# Patient Record
Sex: MALE | Race: WHITE | NOT HISPANIC OR LATINO | Employment: FULL TIME | URBAN - METROPOLITAN AREA
[De-identification: names, ages, dates, MRNs, and addresses within clinical notes are randomized per-mention and may not be internally consistent; named-entity substitution may affect disease eponyms.]

---

## 2017-04-03 ENCOUNTER — OFFICE VISIT (OUTPATIENT)
Dept: CARDIOLOGY | Facility: CLINIC | Age: 67
End: 2017-04-03

## 2017-04-03 VITALS
HEIGHT: 72 IN | BODY MASS INDEX: 29.8 KG/M2 | HEART RATE: 91 BPM | WEIGHT: 220 LBS | DIASTOLIC BLOOD PRESSURE: 60 MMHG | SYSTOLIC BLOOD PRESSURE: 118 MMHG

## 2017-04-03 DIAGNOSIS — R55 SYNCOPE, UNSPECIFIED SYNCOPE TYPE: Primary | ICD-10-CM

## 2017-04-03 DIAGNOSIS — E78.5 HYPERLIPIDEMIA, UNSPECIFIED HYPERLIPIDEMIA TYPE: ICD-10-CM

## 2017-04-03 DIAGNOSIS — I10 ESSENTIAL HYPERTENSION: ICD-10-CM

## 2017-04-03 PROCEDURE — 99214 OFFICE O/P EST MOD 30 MIN: CPT | Performed by: INTERNAL MEDICINE

## 2017-04-03 PROCEDURE — 93000 ELECTROCARDIOGRAM COMPLETE: CPT | Performed by: INTERNAL MEDICINE

## 2017-04-03 RX ORDER — TAMSULOSIN HYDROCHLORIDE 0.4 MG/1
0.4 CAPSULE ORAL DAILY
COMMUNITY

## 2017-04-03 RX ORDER — BENAZEPRIL HYDROCHLORIDE 40 MG/1
1 TABLET, FILM COATED ORAL DAILY
COMMUNITY
Start: 2017-02-15 | End: 2017-06-08 | Stop reason: SDUPTHER

## 2017-04-03 RX ORDER — PANTOPRAZOLE SODIUM 40 MG/1
40 TABLET, DELAYED RELEASE ORAL DAILY
COMMUNITY
Start: 2016-10-21 | End: 2017-10-21

## 2017-04-03 RX ORDER — AMLODIPINE BESYLATE 10 MG/1
1 TABLET ORAL DAILY
COMMUNITY
Start: 2016-07-21 | End: 2017-05-15

## 2017-04-03 RX ORDER — ALLOPURINOL 100 MG/1
1 TABLET ORAL DAILY
COMMUNITY
Start: 2016-10-23

## 2017-04-03 RX ORDER — PRAVASTATIN SODIUM 40 MG
1 TABLET ORAL DAILY
COMMUNITY
Start: 2016-07-21 | End: 2021-09-22

## 2017-04-03 RX ORDER — GABAPENTIN 300 MG/1
300 CAPSULE ORAL 4 TIMES DAILY
COMMUNITY
Start: 2016-12-21 | End: 2019-09-09 | Stop reason: ALTCHOICE

## 2017-04-03 RX ORDER — ASPIRIN 81 MG/1
81 TABLET ORAL DAILY
COMMUNITY

## 2017-04-03 RX ORDER — BENAZEPRIL HYDROCHLORIDE 40 MG/1
1 TABLET, FILM COATED ORAL DAILY
COMMUNITY
Start: 2017-03-20

## 2017-04-03 NOTE — PROGRESS NOTES
Date of Office Visit: 2017  Encounter Provider: Ailza Saleh MD  Place of Service: [facility] CARDIOLOGY  Patient Name: Dustin Wilkerson  :1950      Patient ID:  Dustin Wilkerson is a 66 y.o. male is here for syncope          History of Present Illness     Laboratory values from 12/15/2016, show normal BMP except for a glucose, which was elevated at 135, normal CBC, and normal liver panel.  I have an office visit to Dr. Mahoney from 2017.  At that visit, he was seen for three unexplained falls where he will suddenly fall without warning.  He has had an injury associated with this and was given a referral to cardiology and neurology.  He has a history of essential hypertension, gout, and hyperlipidemia.  I do not see any other medical records at this point.       We did check orthostatic blood pressures in the office today.  Lying his blood pressure was 130/76 with heart rate of 97, sitting his blood pressure was 120/70 with a heart rate of 90 and standing his blood pressure was 118/60 with a heart rate of 91.       In talking with him today, he gives a history that, in 2016, he was walking out in his yard, went over to turn the hose off, and fell.  He found himself on the ground.  He is not sure if he passed out and he had a right arm injury, which was moderately severe.  He was able to continue.  He said he did not feel his heart racing or skipping before this.  He had no dizziness.  He had no chest pain or pressure and no difficulty breathing.  He just did not really know what happened, and he has been a lifelong exerciser and has never had any symptoms with exercise.  On 2016, he had taken his medications.  It was late in the evening (11:00 at night), and he went onto his front porch and, the next thing he knew, he found himself on the ground.  He is not sure that he had any syncope.  In fact, he thinks he did not; however, he sustained a severe  right leg injury and had to have multiple surgeries to repair all of his quadriceps tendons on his right leg.  He does not know why this happened.  Again, before this, he had no chest pain or difficulty breathing.  In talking with him, he is on numerous medications and he takes all of them at night, and he had taken all of them right before this second incident in 12/2016.  Because his injury was so severe, he has not walked since 12/02/2016.  With his surgeries, he was in a wheelchair for a while and then he was on a walker and now he is with a cane and has a big brace on his leg.      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  This was in 08/2015.  He has seen Dr. Sunny Mcelroy from ENT about his hearing loss.  He also has an appointment with Dr. Dimas Richmond from neurology this summer.      He is treated for hypertension and hyperlipidemia but he has never had any kidney problems, no asthma or emphysema, no seizures, stroke, or aneurysms, no sleep apnea, no blood clots or cancer, no history of myocardial infarction or rheumatic fever, no history of heart rhythm disturbances, and no heart failure.      He has one caffeinated beverage a day and one 16-ounce bottle of water a day.  He has two children.  He is  and is the president of [employer].  In his family, there is no premature cardiovascular disease.  His mother had strokes.  He does not use any alcohol and he does not smoke.      He has been very healthy up to this point.  He says his blood pressure at baseline normally runs about 135/75 mmHg, so the fact that his blood pressure drops down to 118/60 mmHg is low for him.      He denies any symptoms of exertional chest tightness or pressure and no orthopnea or paroxysmal nocturnal dyspnea.          Past Medical History:   Diagnosis Date   • Gout          Past Surgical History:   Procedure Laterality Date   • KNEE  SURGERY         No current outpatient prescriptions on file prior to visit.     No current facility-administered medications on file prior to visit.        Social History     Social History   • Marital status: Unknown     Spouse name: N/A   • Number of children: N/A   • Years of education: N/A     Occupational History   • Not on file.     Social History Main Topics   • Smoking status: Never Smoker   • Smokeless tobacco: Not on file      Comment: caffeine use   • Alcohol use No   • Drug use: No   • Sexual activity: Not on file     Other Topics Concern   • Not on file     Social History Narrative   • No narrative on file           Review of Systems   Constitution: Negative.   HENT: Positive for hearing loss. Negative for congestion and headaches.    Eyes: Negative for vision loss in left eye and vision loss in right eye.   Respiratory: Positive for snoring. Negative for cough, hemoptysis, shortness of breath, sleep disturbances due to breathing, sputum production and wheezing.    Endocrine: Negative.    Hematologic/Lymphatic: Negative.    Skin: Negative for poor wound healing and rash.   Musculoskeletal: Negative for falls, gout, muscle cramps and myalgias.   Gastrointestinal: Negative for abdominal pain, diarrhea, dysphagia, hematemesis, melena, nausea and vomiting.   Neurological: Negative for excessive daytime sleepiness, dizziness, light-headedness, loss of balance, seizures and vertigo.   Psychiatric/Behavioral: Negative for depression and substance abuse. The patient is not nervous/anxious.        Procedures    ECG 12 Lead  Date/Time: 4/3/2017 1:10 PM  Performed by: BRIANNA FORBES  Authorized by: BRIANNA FORBES   Comparison: compared with previous ECG   Similar to previous ECG  Rhythm: sinus rhythm  Conduction: incomplete RBBB  QRS axis: left  Clinical impression: non-specific ECG               Objective:      Vitals:    04/03/17 1258 04/03/17 1302   BP: 130/70 140/76   BP Location: Right arm Left  "arm   Patient Position: Sitting Sitting   Pulse: 76    Weight: 220 lb (99.8 kg)    Height: 72\" (182.9 cm)      Body mass index is 29.84 kg/(m^2).    Physical Exam   Constitutional: He is oriented to person, place, and time. He appears well-developed and well-nourished. No distress.   HENT:   Head: Normocephalic and atraumatic.   Eyes: Conjunctivae are normal. No scleral icterus.   Neck: Neck supple. No JVD present. Carotid bruit is not present. No thyromegaly present.   Cardiovascular: Normal rate, regular rhythm, S1 normal, S2 normal, normal heart sounds and intact distal pulses.   No extrasystoles are present. PMI is not displaced.  Exam reveals no gallop.    No murmur heard.  Pulses:       Carotid pulses are 2+ on the right side, and 2+ on the left side.       Radial pulses are 2+ on the right side, and 2+ on the left side.        Dorsalis pedis pulses are 2+ on the right side, and 2+ on the left side.        Posterior tibial pulses are 2+ on the right side, and 2+ on the left side.   Pulmonary/Chest: Effort normal and breath sounds normal. No respiratory distress. He has no wheezes. He has no rhonchi. He has no rales. He exhibits no tenderness.   Abdominal: Soft. Bowel sounds are normal. He exhibits no distension, no abdominal bruit and no mass. There is no tenderness.   Musculoskeletal: He exhibits no edema or deformity.   Lymphadenopathy:     He has no cervical adenopathy.   Neurological: He is alert and oriented to person, place, and time. No cranial nerve deficit.   Skin: Skin is warm and dry. No rash noted. He is not diaphoretic. No cyanosis. No pallor. Nails show no clubbing.   Psychiatric: He has a normal mood and affect. Judgment normal.   Vitals reviewed.      Lab Review:       Assessment:      Diagnosis Plan   1. Syncope, unspecified syncope type     2. Essential hypertension     3. Hyperlipidemia, unspecified hyperlipidemia type        1. Two significant falls resulting in injury, both of them " right-sided (right arm injury and severe right leg injury).  He does not believe that he passed out with either of these and feels like he remembers most of the incident but cannot determine why he fell on either incident.  He has never had any palpitations and no dizziness, no chest pain or pressure, and no difficulty breathing, but I am concerned that maybe he has had some sort of rhythm disturbance or even is a little more orthostatic than what we have been able to appreciate.  I have asked him to take his Lotensin in the morning and I have ordered a Zio Patch and an echocardiogram.  He is also going to follow up with neurology (Dr. Dimas Richmond).    2. Hypertension, which has been under good control.    3. Hyperlipidemia for which he takes pravastatin.    4. Gout.      I am not sure why he takes Revatio and if that was at all involved in either of these incidences.           Plan:       F/u in 6 months.  No med changes.

## 2017-04-19 ENCOUNTER — TELEPHONE (OUTPATIENT)
Dept: CARDIOLOGY | Facility: CLINIC | Age: 67
End: 2017-04-19

## 2017-04-19 ENCOUNTER — HOSPITAL ENCOUNTER (OUTPATIENT)
Dept: CARDIOLOGY | Facility: HOSPITAL | Age: 67
Discharge: HOME | End: 2017-04-19
Attending: INTERNAL MEDICINE | Admitting: INTERNAL MEDICINE

## 2017-04-19 ENCOUNTER — HOSPITAL ENCOUNTER (OUTPATIENT)
Dept: CARDIOLOGY | Facility: HOSPITAL | Age: 67
Discharge: HOME | End: 2017-04-19
Attending: INTERNAL MEDICINE

## 2017-04-19 VITALS
SYSTOLIC BLOOD PRESSURE: 160 MMHG | BODY MASS INDEX: 29.8 KG/M2 | WEIGHT: 220 LBS | HEIGHT: 72 IN | DIASTOLIC BLOOD PRESSURE: 97 MMHG | HEART RATE: 77 BPM

## 2017-04-19 DIAGNOSIS — R55 SYNCOPE, UNSPECIFIED SYNCOPE TYPE: ICD-10-CM

## 2017-04-19 DIAGNOSIS — I10 ESSENTIAL HYPERTENSION: ICD-10-CM

## 2017-04-19 LAB
AORTIC ARCH: 2.9 CM
ASCENDING AORTA: 4.2 CM
BH CV ECHO MEAS - ACS: 2 CM
BH CV ECHO MEAS - AO MAX PG (FULL): 1.8 MMHG
BH CV ECHO MEAS - AO MAX PG: 6.8 MMHG
BH CV ECHO MEAS - AO MEAN PG (FULL): 2 MMHG
BH CV ECHO MEAS - AO MEAN PG: 4 MMHG
BH CV ECHO MEAS - AO ROOT AREA (BSA CORRECTED): 1.9
BH CV ECHO MEAS - AO ROOT AREA: 14.5 CM^2
BH CV ECHO MEAS - AO ROOT DIAM: 4.3 CM
BH CV ECHO MEAS - AO V2 MAX: 130 CM/SEC
BH CV ECHO MEAS - AO V2 MEAN: 89 CM/SEC
BH CV ECHO MEAS - AO V2 VTI: 27.4 CM
BH CV ECHO MEAS - ASC AORTA: 4.2 CM
BH CV ECHO MEAS - AVA(I,A): 3.2 CM^2
BH CV ECHO MEAS - AVA(I,D): 3.2 CM^2
BH CV ECHO MEAS - AVA(V,A): 3.2 CM^2
BH CV ECHO MEAS - AVA(V,D): 3.2 CM^2
BH CV ECHO MEAS - BSA(HAYCOCK): 2.3 M^2
BH CV ECHO MEAS - BSA: 2.2 M^2
BH CV ECHO MEAS - BZI_BMI: 29.8 KILOGRAMS/M^2
BH CV ECHO MEAS - BZI_METRIC_HEIGHT: 182.9 CM
BH CV ECHO MEAS - BZI_METRIC_WEIGHT: 99.8 KG
BH CV ECHO MEAS - CONTRAST EF (2CH): 63.1 ML/M^2
BH CV ECHO MEAS - CONTRAST EF 4CH: 64.4 ML/M^2
BH CV ECHO MEAS - EDV(CUBED): 128 ML
BH CV ECHO MEAS - EDV(MOD-SP2): 107 ML
BH CV ECHO MEAS - EDV(MOD-SP4): 103 ML
BH CV ECHO MEAS - EDV(TEICH): 120.5 ML
BH CV ECHO MEAS - EF(CUBED): 85 %
BH CV ECHO MEAS - EF(MOD-SP2): 63.1 %
BH CV ECHO MEAS - EF(MOD-SP4): 64.4 %
BH CV ECHO MEAS - EF(TEICH): 78 %
BH CV ECHO MEAS - ESV(CUBED): 19.2 ML
BH CV ECHO MEAS - ESV(MOD-SP2): 39.5 ML
BH CV ECHO MEAS - ESV(MOD-SP4): 36.7 ML
BH CV ECHO MEAS - ESV(TEICH): 26.5 ML
BH CV ECHO MEAS - FS: 46.8 %
BH CV ECHO MEAS - IVS/LVPW: 1.2
BH CV ECHO MEAS - IVSD: 1.1 CM
BH CV ECHO MEAS - LAT PEAK E' VEL: 11 CM/SEC
BH CV ECHO MEAS - LV DIASTOLIC VOL/BSA (35-75): 46.4 ML/M^2
BH CV ECHO MEAS - LV MASS(C)D: 185.4 GRAMS
BH CV ECHO MEAS - LV MASS(C)DI: 83.6 GRAMS/M^2
BH CV ECHO MEAS - LV MAX PG: 4.9 MMHG
BH CV ECHO MEAS - LV MEAN PG: 2 MMHG
BH CV ECHO MEAS - LV SYSTOLIC VOL/BSA (12-30): 16.5 ML/M^2
BH CV ECHO MEAS - LV V1 MAX: 111 CM/SEC
BH CV ECHO MEAS - LV V1 MEAN: 63.3 CM/SEC
BH CV ECHO MEAS - LV V1 VTI: 23.3 CM
BH CV ECHO MEAS - LVIDD: 5 CM
BH CV ECHO MEAS - LVIDS: 2.7 CM
BH CV ECHO MEAS - LVLD AP2: 8.8 CM
BH CV ECHO MEAS - LVLD AP4: 8 CM
BH CV ECHO MEAS - LVLS AP2: 7.5 CM
BH CV ECHO MEAS - LVLS AP4: 6.7 CM
BH CV ECHO MEAS - LVOT AREA (M): 3.8 CM^2
BH CV ECHO MEAS - LVOT AREA: 3.8 CM^2
BH CV ECHO MEAS - LVOT DIAM: 2.2 CM
BH CV ECHO MEAS - LVPWD: 0.92 CM
BH CV ECHO MEAS - MED PEAK E' VEL: 7 CM/SEC
BH CV ECHO MEAS - MV A DUR: 0.11 SEC
BH CV ECHO MEAS - MV A MAX VEL: 107 CM/SEC
BH CV ECHO MEAS - MV DEC SLOPE: 544 CM/SEC^2
BH CV ECHO MEAS - MV DEC TIME: 0.18 SEC
BH CV ECHO MEAS - MV E MAX VEL: 82.9 CM/SEC
BH CV ECHO MEAS - MV E/A: 0.77
BH CV ECHO MEAS - MV MAX PG: 5.1 MMHG
BH CV ECHO MEAS - MV MEAN PG: 2 MMHG
BH CV ECHO MEAS - MV P1/2T MAX VEL: 117 CM/SEC
BH CV ECHO MEAS - MV P1/2T: 63 MSEC
BH CV ECHO MEAS - MV V2 MAX: 113 CM/SEC
BH CV ECHO MEAS - MV V2 MEAN: 62.3 CM/SEC
BH CV ECHO MEAS - MV V2 VTI: 35.3 CM
BH CV ECHO MEAS - MVA P1/2T LCG: 1.9 CM^2
BH CV ECHO MEAS - MVA(P1/2T): 3.5 CM^2
BH CV ECHO MEAS - MVA(VTI): 2.5 CM^2
BH CV ECHO MEAS - PA ACC TIME: 0.07 SEC
BH CV ECHO MEAS - PA MAX PG (FULL): 2 MMHG
BH CV ECHO MEAS - PA MAX PG: 5.3 MMHG
BH CV ECHO MEAS - PA PR(ACCEL): 47.5 MMHG
BH CV ECHO MEAS - PA V2 MAX: 115 CM/SEC
BH CV ECHO MEAS - PULM A REVS DUR: 0.1 SEC
BH CV ECHO MEAS - PULM A REVS VEL: 41.3 CM/SEC
BH CV ECHO MEAS - PULM DIAS VEL: 49.6 CM/SEC
BH CV ECHO MEAS - PULM S/D: 1.5
BH CV ECHO MEAS - PULM SYS VEL: 74.8 CM/SEC
BH CV ECHO MEAS - PVA(V,A): 7.6 CM^2
BH CV ECHO MEAS - PVA(V,D): 7.6 CM^2
BH CV ECHO MEAS - QP/QS: 1.7
BH CV ECHO MEAS - RV MAX PG: 3.3 MMHG
BH CV ECHO MEAS - RV MEAN PG: 2 MMHG
BH CV ECHO MEAS - RV V1 MAX: 90.9 CM/SEC
BH CV ECHO MEAS - RV V1 MEAN: 64.9 CM/SEC
BH CV ECHO MEAS - RV V1 VTI: 16.1 CM
BH CV ECHO MEAS - RVOT AREA: 9.6 CM^2
BH CV ECHO MEAS - RVOT DIAM: 3.5 CM
BH CV ECHO MEAS - SI(AO): 179.4 ML/M^2
BH CV ECHO MEAS - SI(CUBED): 49 ML/M^2
BH CV ECHO MEAS - SI(LVOT): 39.9 ML/M^2
BH CV ECHO MEAS - SI(MOD-SP2): 30.4 ML/M^2
BH CV ECHO MEAS - SI(MOD-SP4): 29.9 ML/M^2
BH CV ECHO MEAS - SI(TEICH): 42.3 ML/M^2
BH CV ECHO MEAS - SUP REN AO DIAM: 2 CM
BH CV ECHO MEAS - SV(AO): 397.9 ML
BH CV ECHO MEAS - SV(CUBED): 108.8 ML
BH CV ECHO MEAS - SV(LVOT): 88.6 ML
BH CV ECHO MEAS - SV(MOD-SP2): 67.5 ML
BH CV ECHO MEAS - SV(MOD-SP4): 66.3 ML
BH CV ECHO MEAS - SV(RVOT): 154.9 ML
BH CV ECHO MEAS - SV(TEICH): 93.9 ML
BH CV ECHO MEAS - TAPSE (>1.6): 2.1 CM2
BH CV XLRA - RV BASE: 3.5 CM
BH CV XLRA - TDI S': 17 CM/SEC
E/E' RATIO: 10
LEFT ATRIUM VOLUME INDEX: 22 ML/M2
LV EF 2D ECHO EST: 65 %

## 2017-04-19 PROCEDURE — 0399T HC MYOCARDL STRAIN IMAG QUAN ASSMT PER SESS: CPT

## 2017-04-19 PROCEDURE — 0296T HC EXT ECG > 48HR TO 21 DAY RCRD W/CONECT INTL RCRD: CPT

## 2017-04-19 PROCEDURE — 0399T ADULT TRANSTHORACIC ECHO COMPLETE: CPT | Performed by: INTERNAL MEDICINE

## 2017-04-19 PROCEDURE — 93306 TTE W/DOPPLER COMPLETE: CPT | Performed by: INTERNAL MEDICINE

## 2017-04-19 PROCEDURE — 93306 TTE W/DOPPLER COMPLETE: CPT

## 2017-04-19 NOTE — TELEPHONE ENCOUNTER
----- Message from Aliza Saleh MD sent at 4/19/2017 12:14 PM EDT -----  Called and left message to cll back for results - needs cta chest.

## 2017-04-20 DIAGNOSIS — I77.810 ASCENDING AORTA DILATATION (HCC): Primary | ICD-10-CM

## 2017-05-09 ENCOUNTER — HOSPITAL ENCOUNTER (OUTPATIENT)
Dept: CT IMAGING | Facility: HOSPITAL | Age: 67
End: 2017-05-09
Attending: INTERNAL MEDICINE

## 2017-05-11 ENCOUNTER — HOSPITAL ENCOUNTER (OUTPATIENT)
Dept: CT IMAGING | Facility: HOSPITAL | Age: 67
Discharge: HOME | End: 2017-05-11
Attending: INTERNAL MEDICINE | Admitting: INTERNAL MEDICINE

## 2017-05-11 DIAGNOSIS — I77.810 ASCENDING AORTA DILATATION (HCC): ICD-10-CM

## 2017-05-11 LAB — CREAT BLDA-MCNC: 1.2 MG/DL (ref 0.6–1.3)

## 2017-05-11 PROCEDURE — 71275 CT ANGIOGRAPHY CHEST: CPT

## 2017-05-11 PROCEDURE — 82565 ASSAY OF CREATININE: CPT

## 2017-05-11 PROCEDURE — 0 IOPAMIDOL PER 1 ML: Performed by: INTERNAL MEDICINE

## 2017-05-11 RX ADMIN — IOPAMIDOL 100 ML: 755 INJECTION, SOLUTION INTRAVENOUS at 11:18

## 2017-05-12 ENCOUNTER — APPOINTMENT (OUTPATIENT)
Dept: CT IMAGING | Facility: HOSPITAL | Age: 67
End: 2017-05-12
Attending: INTERNAL MEDICINE

## 2017-05-12 PROCEDURE — 0298T ZIO PATCH: CPT | Performed by: INTERNAL MEDICINE

## 2017-05-15 ENCOUNTER — TELEPHONE (OUTPATIENT)
Dept: CARDIOLOGY | Facility: CLINIC | Age: 67
End: 2017-05-15

## 2017-05-15 RX ORDER — METOPROLOL SUCCINATE 50 MG/1
50 TABLET, EXTENDED RELEASE ORAL NIGHTLY
Qty: 90 TABLET | Refills: 3 | Status: SHIPPED | OUTPATIENT
Start: 2017-05-15 | End: 2017-06-08 | Stop reason: SDUPTHER

## 2017-05-15 RX ORDER — AMLODIPINE BESYLATE 10 MG/1
5 TABLET ORAL DAILY
COMMUNITY
Start: 2017-05-15 | End: 2017-05-31 | Stop reason: DRUGHIGH

## 2017-05-26 ENCOUNTER — TELEPHONE (OUTPATIENT)
Dept: CARDIOLOGY | Facility: CLINIC | Age: 67
End: 2017-05-26

## 2017-05-30 RX ORDER — HYDROCHLOROTHIAZIDE 12.5 MG/1
12.5 CAPSULE, GELATIN COATED ORAL DAILY
Qty: 90 CAPSULE | Refills: 3 | Status: SHIPPED | OUTPATIENT
Start: 2017-05-30 | End: 2017-06-27 | Stop reason: DRUGHIGH

## 2017-05-31 RX ORDER — AMLODIPINE BESYLATE 5 MG/1
5 TABLET ORAL DAILY
Qty: 90 TABLET | Refills: 3 | Status: SHIPPED | OUTPATIENT
Start: 2017-05-31 | End: 2017-06-08 | Stop reason: SDUPTHER

## 2017-06-08 ENCOUNTER — OFFICE VISIT (OUTPATIENT)
Dept: CARDIOLOGY | Facility: CLINIC | Age: 67
End: 2017-06-08

## 2017-06-08 VITALS
HEART RATE: 63 BPM | WEIGHT: 238 LBS | SYSTOLIC BLOOD PRESSURE: 142 MMHG | BODY MASS INDEX: 32.23 KG/M2 | HEIGHT: 72 IN | DIASTOLIC BLOOD PRESSURE: 80 MMHG

## 2017-06-08 DIAGNOSIS — I10 ESSENTIAL HYPERTENSION: Primary | ICD-10-CM

## 2017-06-08 PROCEDURE — 99213 OFFICE O/P EST LOW 20 MIN: CPT | Performed by: NURSE PRACTITIONER

## 2017-06-08 RX ORDER — AMLODIPINE BESYLATE 5 MG/1
2.5 TABLET ORAL DAILY
Qty: 90 TABLET | Refills: 3 | Status: SHIPPED | OUTPATIENT
Start: 2017-06-08 | End: 2019-09-24

## 2017-06-08 RX ORDER — METOPROLOL SUCCINATE 50 MG/1
50 TABLET, EXTENDED RELEASE ORAL 2 TIMES DAILY
Qty: 60 TABLET | Refills: 3 | Status: SHIPPED | OUTPATIENT
Start: 2017-06-08 | End: 2017-12-31 | Stop reason: SDUPTHER

## 2017-06-09 PROCEDURE — 93000 ELECTROCARDIOGRAM COMPLETE: CPT | Performed by: NURSE PRACTITIONER

## 2017-06-09 NOTE — PROGRESS NOTES
Date of Office Visit: 2017  Encounter Provider: MAYNOR Chirinos  Place of Service: [facility] CARDIOLOGY  Patient Name: Dustin Wilkerson  :1950    Chief Complaint   Patient presents with   • Hypertension   :     HPI: Dustin Wilkerson is a 66 y.o. male comes in today for  Hypertension. He is a patient of Dr. Saleh. I am seeing him for the 1st time today. He has a history of syncope. He came in to establish care with Dr. Saleh in 2017. He came in because he had 3 unexplained falls where he suddenly fell without warning. He had an injury to his leg associated with a fall. He had been referred to Cardiology and Neurology. His cardiac history includes hypertension and hyperlipidemia.     They did check orthostatic blood pressures in the office. His blood pressure lying was 130/76 with a heart rate of 97, sitting 120/70 with a heart rate of 90 and standing 118/60 with a heart rate of 91. He had an MRI of the head performed in  showing an old chronic infarct of the left espinoza radiata. Dr. Saleh was concerned with his blood pressure but also concerned that there may be a rhythm problem. She did have a ZIO patch placed. According to him, his blood pressure has been under good control and so blood pressure medicines were not changed at that office visit.     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 Dr. Saleh started him on metoprolol. After starting him on metoprolol, the patient called in complaining of hypertension. He was started on hydrochlorothiazide 12.5 daily and his amlodipine was decreased. He was also having problems with leg swelling. His EF was 65%. He had mild dilation of the aortic root. He did have a CTA because of the mild dilation of the aortic root. This did show a dilated aortic root and  tubular ascending aorta measuring 4.7 cm and 4.5 cm respectively. A 2.2 cm cystic lesion from the tail of the pancreas.     Today, the patient comes in because he has been noticing that his blood pressure has been elevated. His blood pressures have been as high as 150/92. Sometimes they are high in the evening. Currently today they are 142/80. He needs good blood pressure control due to his dilated ascending aorta. He has not had any syncopal episodes. He does complain of having some lower extremity edema though. He denies any palpitations or tachycardia or shortness of breath. He denies any chest pain, orthopnea or PND. He has had a few episodes of dizziness since increasing his hydrochlorothiazide. He has noted this dizziness while driving.        Past Medical History:   Diagnosis Date   • Essential hypertension    • Falls     3 unexplained   • Gout    • Hearing loss    • Hyperlipidemia    • Hypertension    • Snoring    • Syncope        Past Surgical History:   Procedure Laterality Date   • KNEE SURGERY             Review of Systems   Constitution: Positive for weight gain.   HENT: Positive for hearing loss. Negative for congestion and headaches.    Eyes: Negative for vision loss in left eye and vision loss in right eye.   Respiratory: Positive for snoring. Negative for cough, hemoptysis, shortness of breath, sleep disturbances due to breathing, sputum production and wheezing.    Endocrine: Negative.    Hematologic/Lymphatic: Negative.    Skin: Negative for poor wound healing and rash.   Musculoskeletal: Negative for falls, gout, muscle cramps and myalgias.   Gastrointestinal: Negative for abdominal pain, diarrhea, dysphagia, hematemesis, melena, nausea and vomiting.   Neurological: Negative for excessive daytime sleepiness, dizziness, light-headedness, loss of balance, seizures and vertigo.   Psychiatric/Behavioral: Negative for depression and substance abuse. The patient is not nervous/anxious.      All other  "systems reviewed and are negative    No Known Allergies    All aspects of family and social history reviewed.          Objective:     Vitals:    06/08/17 1507   BP: 142/80   Pulse: 63   Weight: 238 lb (108 kg)   Height: 72\" (182.9 cm)     Body mass index is 32.28 kg/(m^2).    PHYSICAL EXAM:  Physical Exam   Constitutional: He is oriented to person, place, and time. He appears well-developed and well-nourished. No distress.   HENT:   Head: Normocephalic and atraumatic.   Eyes: Conjunctivae are normal. No scleral icterus.   Neck: Neck supple. No JVD present. Carotid bruit is not present. No thyromegaly present.   Cardiovascular: Normal rate, regular rhythm, S1 normal, S2 normal, normal heart sounds and intact distal pulses.   No extrasystoles are present. PMI is not displaced.  Exam reveals no gallop.    No murmur heard.  Pulses:       Carotid pulses are 2+ on the right side, and 2+ on the left side.       Radial pulses are 2+ on the right side, and 2+ on the left side.        Dorsalis pedis pulses are 2+ on the right side, and 2+ on the left side.        Posterior tibial pulses are 2+ on the right side, and 2+ on the left side.   Pulmonary/Chest: Effort normal and breath sounds normal. No respiratory distress. He has no wheezes. He has no rhonchi. He has no rales. He exhibits no tenderness.   Abdominal: Soft. Bowel sounds are normal. He exhibits no distension, no abdominal bruit and no mass. There is no tenderness.   Musculoskeletal: He exhibits edema. He exhibits no deformity. Tenderness: trace.   Lymphadenopathy:     He has no cervical adenopathy.   Neurological: He is alert and oriented to person, place, and time. No cranial nerve deficit.   Skin: Skin is warm and dry. No rash noted. He is not diaphoretic. No cyanosis. No pallor. Nails show no clubbing.   Psychiatric: He has a normal mood and affect. Judgment normal.   Vitals reviewed.        ECG 12 Lead  Date/Time: 6/9/2017 8:55 AM  Performed by: MYLENE " PATRICIA  Authorized by: PATRICIA CHAKRABORTY   Comparison: compared with previous ECG from 4/3/2017  Similar to previous ECG  Rhythm: sinus rhythm  Rate: normal  BPM: 63  Conduction: conduction normal  ST Segments: ST segments normal  T Waves: T waves normal  QRS axis: normal  Other: no other findings  Clinical impression: normal ECG  Comments: Indication: HTN                Assessment:       Diagnosis Plan   1. Essential hypertension          Orders Placed This Encounter   Procedures   • ECG 12 Lead     This order was created via procedure documentation       Current Outpatient Prescriptions   Medication Sig Dispense Refill   • allopurinol (ZYLOPRIM) 100 MG tablet Take 1 tablet by mouth Daily.     • amLODIPine (NORVASC) 5 MG tablet Take 0.5 tablets by mouth Daily. 90 tablet 3   • aspirin 81 MG EC tablet Take 81 mg by mouth Daily.     • benazepril (LOTENSIN) 40 MG tablet Take 1 tablet by mouth Daily.     • gabapentin (NEURONTIN) 300 MG capsule Take 300 mg by mouth 4 (Four) Times a Day.     • hydrochlorothiazide (MICROZIDE) 12.5 MG capsule Take 1 capsule by mouth Daily. 90 capsule 3   • metoprolol succinate XL (TOPROL-XL) 50 MG 24 hr tablet Take 1 tablet by mouth 2 (Two) Times a Day. 60 tablet 3   • Multiple Vitamin (MULTI VITAMIN PO) Take 1 tablet by mouth Daily.     • pantoprazole (PROTONIX) 40 MG EC tablet Take 40 mg by mouth Daily.     • pravastatin (PRAVACHOL) 40 MG tablet Take 1 tablet by mouth Daily.     • tamsulosin (FLOMAX) 0.4 MG capsule 24 hr capsule Take 0.4 mg by mouth Daily.       No current facility-administered medications for this visit.             Plan:        The patient comes in today concerned about his lower extremity edema and some hypertension. This has him very concerned because of his dilated aorta. I think his lower extremity edema is coming from his amlodipine. I am going to decrease that to 2.5 mg daily. He has also been taking 25 of hydrochlorothiazide which may be causing him to be  dehydrated and dizzy as well. I am going to decrease that to 12.5 mg. He was having episodes of SVT. His heart rate is controlled here today. We will try to go up on his metoprolol to 50 mg b.i.d. He is to call if he has any dizziness. He will call in 2 weeks with a record of his blood pressures. If his blood pressures are stable or abnormal, we will make adjustments at that time.            Follow up in office as scheduled. CAll in 2 weeks  As always, it has been a pleasure to participate in this patient's care.      Sincerely,      MAYNOR Chirinos

## 2017-06-16 ENCOUNTER — OFFICE VISIT (OUTPATIENT)
Dept: NEUROLOGY | Facility: CLINIC | Age: 67
End: 2017-06-16

## 2017-06-16 VITALS
HEIGHT: 72 IN | SYSTOLIC BLOOD PRESSURE: 148 MMHG | BODY MASS INDEX: 32.23 KG/M2 | OXYGEN SATURATION: 94 % | HEART RATE: 64 BPM | WEIGHT: 238 LBS | DIASTOLIC BLOOD PRESSURE: 86 MMHG

## 2017-06-16 DIAGNOSIS — R93.0 ABNORMAL MRI OF HEAD: ICD-10-CM
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
## 2017-06-16 DIAGNOSIS — I63.312 CEREBRAL INFARCTION DUE TO THROMBOSIS OF LEFT MIDDLE CEREBRAL ARTERY (HCC): ICD-10-CM

## 2017-06-16 DIAGNOSIS — M54.16 LUMBAR RADICULOPATHY, RIGHT: ICD-10-CM

## 2017-06-16 PROBLEM — R55 SYNCOPE: Status: RESOLVED | Noted: 2017-04-03 | Resolved: 2017-06-16

## 2017-06-16 PROCEDURE — 99204 OFFICE O/P NEW MOD 45 MIN: CPT | Performed by: PSYCHIATRY & NEUROLOGY

## 2017-06-16 NOTE — PROGRESS NOTES
Subjective:     Patient ID: Dustin Wilkerson is a 66 y.o. male.    History of Present Illness  The following portions of the patient's history were reviewed and updated as appropriate: allergies, current medications, past family history, past medical history, past social history, past surgical history and problem list.  Falls:: There is a chronic history of hypertension.  He has had 3 unexplained falls with no aura one of which he noted a leg injury.  The first episode was last October while outside walking in the long.  He L without loss of consciousness and bruised his right arm.  The second episode was a trip and fall in the basement probably a month later.  The third episode was last December on the porch: He turned and fell twisting his right knee.  This was a particularly severe injury to the quadriceps requiring surgery.  His recovery was also prolonged.  He spent 3 weeks in a wheelchair and then using a brace and now walking very gingerly.    Brain MRI has shown an old lacunae but otherwise no evidence of hydrocephalus.  At no other imbalance or falls since his knee surgery.  He is on chronic therapy with aspirin and pravastatin as well as his antihypertensives.  He was unaware of the lacunae on MRI until we review the reports today    Wise the falls are not well explained.  No dizziness visual change syncope.  Thus unlikely to be related to cardiac for arrhythmia issues.      Follow-up scans have noted evidence of severe lumbar degenerative disc disease and his scan results from [facility] in January revealed mild thoracic spondylosis, as well as a disc extrusion L3-L4 which extended upward impinging on the left L3 root.  It was thought to be a fractured fragment.  In addition at L4-5 there is moderate narrowing and partial collapse of the disc.  There is disc protrusion at this level and mild stenosis of the canal.  L5-S1 is fine.      Since the fall he has had right lumbar radicular pain worse with  prolonged sitting radiating into an L4 or L3 pattern without definite focal weakness.  He has been on gabapentin 300 4 times a day with some success.  He is also gained about 20 pounds since the injury last December.    He has a chronic history of hypertension and hyperlipidemia.  Orthostatic checks were noted with some reduction of systolic from 1:30 lying to 118 standing.  Brain MRI in 2015 apparently showed an old infarct in the left corona radiata.  He is also undergoing monitoring with a  ZIO  patch.  It showed bursts of SVT, heart rate in the 150 range with irregularity thought to be a the tad reentry tachycardia.  This was treated with metoprolol then changed to other medications as listed.  Last echocardiogram showed ejection fraction 65% with mild dilatation of the aortic root.  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  This is going to be reevaluated through his cardiologist or PCP.  He was unaware of this lesion.    Review of Systems   Constitutional: Positive for unexpected weight change. Negative for activity change, appetite change and fatigue.   HENT: Positive for hearing loss. Negative for ear pain, facial swelling and trouble swallowing.    Eyes: Negative for photophobia, pain and visual disturbance.   Respiratory: Negative for choking, chest tightness and shortness of breath.    Cardiovascular: Negative for chest pain, palpitations and leg swelling.   Gastrointestinal: Negative for abdominal pain, constipation and nausea.   Endocrine: Negative for polydipsia, polyphagia and polyuria.   Genitourinary: Negative for difficulty urinating, frequency and urgency.   Musculoskeletal: Negative for back pain, gait problem and neck pain.   Skin: Negative for color change, rash and wound.   Allergic/Immunologic: Negative for environmental allergies, food allergies and immunocompromised state.   Neurological: Negative for dizziness, tremors,  seizures, syncope, facial asymmetry, speech difficulty, weakness, light-headedness, numbness and headaches.   Hematological: Negative for adenopathy. Does not bruise/bleed easily.   Psychiatric/Behavioral: Negative for agitation, behavioral problems, confusion, decreased concentration, dysphoric mood, hallucinations, self-injury, sleep disturbance and suicidal ideas. The patient is not nervous/anxious and is not hyperactive.         Objective:  This patient was well-developed, well-nourished and in no acute distress.      GAIT:  Gait was slow and antalgic due to his right knee injury with normal, station, heel, toe and tandem walk and no drift of the arms.  Romberg’s sign was not present.      VASCULAR: The carotid arteries had normal upstroke to palpation without bruits.  The vertebral arteries were without bruits.  The radial pulses were equal and without delay.  Cardiac examination noted by Dr. Saleh Pedal pulses were normal.  The extremities were without cyanosis, clubbing or edema.    MENTAL STATUS: This patient was alert and oriented to person, place, time and situation.  Recent and remote memory -  intact.  Attention span, fund of knowledge and concentration were normal.  Comprehension, naming, reading, repetition, and language were normal.  Fund of knowledge was intact.    CRANIAL NERVES:  Olfaction-not tested.  Visual fields full in all quadrants to confrontation.  The pupils were equally round and reactive to light at 3 mm.  There was no evidence of a Corby Lindy pupil.  Funduscopic exam revealed no papilledema, hemorrhage or exudate.  The gaze was conjugate. The vertical and horizontal eye movements were full without nystagmus.  There was no facial weakness.  There was no facial sensory loss to pinprick bilaterally.  Hearing was normal for light finger rub and casual speech.  Speech is normal without dysarthria.  The neck is supple and strength is normal in rotation, flexion and extension.  Tongue and  palate movements are normal.    MOTOR UPPER EXTREMTIES:   Bilaterally the deltoid, biceps, triceps, wrist extensors, intrinsic hand muscles, and  were grade 5 and normal.  Bulk, tone and strength of these muscles were normal without abnormal movements.    MOTOR LOWER EXTREMITIES: Bilaterally the hip flexors, hip abductors, knee flexors and extensors, ankle plantar flexors and dorsiflexors were grade 5 with normal. Bulk, tone and strength of these muscles were normal without abnormal movements.  Rank of the right quadriceps was not tested due to pain and recent surgery  DEEP TENDON REFLEXES:  Bilateral biceps, triceps, and brachioradialis reflexes were grade 1 symmetric.  Bilateral knee, hamstrings and ankle reflexes were grade 1 and symmetric.  The plantar responses were downgoing.  Ankle clonus was not present.    CEREBELLAR:  Finger to nose, rapid finger opposition, and heel-to-shin tests were performed normally, bilaterally.    MUSCULOSKELETAL:  Normal range of motion of the neck is noted.  There was no back pain with straight leg raise.  Internal and external rotation of the hips was normal.     SENSORY: There was normal upper and lower extremity sensation to vibration, proprioception, and pinprick.  HIGHER CORTICAL FUNCTION: There were no aphasic or apraxic errors.  Visual fields were intact to confrontation.      Neurologic Exam     Cranial Nerves     CN III, IV, VI   Pupils are equal, round, and reactive to light.  Extraocular motions are normal.       Physical Exam   Constitutional: He appears well-developed and well-nourished.   HENT:   Head: Normocephalic and atraumatic.   Eyes: EOM are normal. Pupils are equal, round, and reactive to light.   Neck: Normal range of motion. Neck supple.   Cardiovascular: Normal rate.    Pulmonary/Chest: Effort normal and breath sounds normal.   Abdominal: Soft. Bowel sounds are normal.   Nursing note and vitals reviewed.      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  I don't think this was the cause of his falls but he should remain on aspirin and statin and have a carotid ultrasound which I have ordered.    The cause for the 2 falls is unexplained but I suspect there was an orthopedic cause, probably lumbar disc disease.  He still has ongoing pain in the right L3 and L4 distribution with prolonged sitting.  This is radicular pain.  I recommended a change in the gabapentin schedule.  He may try 600 in the morning and then see if he needs any more throughout the day.  Gabapentin can cause weight gain.  Lyrica could be tried instead of gabapentin in the future.  He could also try Lidoderm patch or Lidoderm over-the-counter and will give this some thought.  He is already taking physical therapy treatments.  There is a physical therapist and his family as well.  I've recommended that he continue with this course of treatment.  He may require a neurosurgical evaluation if his pain increases on the right because of the disc fragment.    He will call for the results of the ultrasound but I have not scheduled any follow-up.

## 2017-06-26 ENCOUNTER — TELEPHONE (OUTPATIENT)
Dept: CARDIOLOGY | Facility: CLINIC | Age: 67
End: 2017-06-26

## 2017-06-26 NOTE — TELEPHONE ENCOUNTER
10:04 - pt left [facility]-returning your call.  [facility] is best [phone]/yousif

## 2017-06-26 NOTE — TELEPHONE ENCOUNTER
Patient sent communication via email.  This was sent on Thursday, June 22 and I didn't see today.  Attempted to call and discuss his blood pressures but his home phone number goes to his place of employment.  I would like to discuss his other symptoms he presented while in clinic.    Below is communication he sent--  Blood pressure readings…    6/12                 152/85  10:30 AM                   130/79  5:30 PM    6/13                 151/78  10:05 AM                   161/87  5:00 PM    6/14                 158/80  12:35 PM                    154/83  5:50 PM    6/15                 150/80  3:30 PM                      153/85  5:15 PM    6/16                 154/83  10:30 AM                   160/78  2:15 PM    6/17                 145/90  3:00 PM    6/19                 167/89  11:00 AM                   168/92  11:45 AM               169/91  3:40 PM    6/20                 166/88  10:45 AM                   166/88  4:15 PM    6/21                 174/86  10:20 AM                   155/82  5:45 PM    6/22                 160/91 11:45 AM        Yajaira,    These are my BP readings for the last few days, since we changed my meds. FYI,  my ankle swelling has pretty much gone away,  but my foot pads and toes remain swollen. I really want to get these BP readings down ASAP. What do we do next?                 When I visited Dr. Richmond (Neurology) last week, he mentioned that in your report, you indicated that the scan showed I had a cyst on my Pancreas? This is very worrisome to me. Please advise next steps and who I should see to get this checked out. Thanks,

## 2017-06-27 ENCOUNTER — HOSPITAL ENCOUNTER (OUTPATIENT)
Dept: CARDIOLOGY | Facility: HOSPITAL | Age: 67
Discharge: HOME | End: 2017-06-27
Attending: PSYCHIATRY & NEUROLOGY | Admitting: PSYCHIATRY & NEUROLOGY

## 2017-06-27 DIAGNOSIS — I63.312 CEREBRAL INFARCTION DUE TO THROMBOSIS OF LEFT MIDDLE CEREBRAL ARTERY (HCC): ICD-10-CM

## 2017-06-27 DIAGNOSIS — R93.0 ABNORMAL MRI OF HEAD: ICD-10-CM

## 2017-06-27 LAB
BH CV XLRA MEAS LEFT DIST CCA EDV: -20.6 CM/SEC
BH CV XLRA MEAS LEFT DIST CCA PSV: -76.6 CM/SEC
BH CV XLRA MEAS LEFT DIST ICA EDV: -29.5 CM/SEC
BH CV XLRA MEAS LEFT DIST ICA PSV: -88.4 CM/SEC
BH CV XLRA MEAS LEFT MID ICA EDV: -21.7 CM/SEC
BH CV XLRA MEAS LEFT MID ICA PSV: -70.2 CM/SEC
BH CV XLRA MEAS LEFT PROX CCA EDV: -14.7 CM/SEC
BH CV XLRA MEAS LEFT PROX CCA PSV: -71.7 CM/SEC
BH CV XLRA MEAS LEFT PROX ECA EDV: -14.7 CM/SEC
BH CV XLRA MEAS LEFT PROX ECA PSV: -73.7 CM/SEC
BH CV XLRA MEAS LEFT PROX ICA EDV: -24.6 CM/SEC
BH CV XLRA MEAS LEFT PROX ICA PSV: -68.7 CM/SEC
BH CV XLRA MEAS LEFT PROX SCLA PSV: 166 CM/SEC
BH CV XLRA MEAS LEFT VERTEBRAL A EDV: 11.8 CM/SEC
BH CV XLRA MEAS LEFT VERTEBRAL A PSV: 47.1 CM/SEC
BH CV XLRA MEAS RIGHT DIST CCA EDV: -15.8 CM/SEC
BH CV XLRA MEAS RIGHT DIST CCA PSV: -56.9 CM/SEC
BH CV XLRA MEAS RIGHT DIST ICA EDV: -16.3 CM/SEC
BH CV XLRA MEAS RIGHT DIST ICA PSV: -46.7 CM/SEC
BH CV XLRA MEAS RIGHT MID ICA EDV: -20.5 CM/SEC
BH CV XLRA MEAS RIGHT MID ICA PSV: -59.8 CM/SEC
BH CV XLRA MEAS RIGHT PROX CCA EDV: -12.3 CM/SEC
BH CV XLRA MEAS RIGHT PROX CCA PSV: -63.3 CM/SEC
BH CV XLRA MEAS RIGHT PROX ECA EDV: -10 CM/SEC
BH CV XLRA MEAS RIGHT PROX ECA PSV: -51 CM/SEC
BH CV XLRA MEAS RIGHT PROX ICA EDV: -18.8 CM/SEC
BH CV XLRA MEAS RIGHT PROX ICA PSV: -54.5 CM/SEC
BH CV XLRA MEAS RIGHT PROX SCLA PSV: 78.6 CM/SEC
BH CV XLRA MEAS RIGHT VERTEBRAL A EDV: 9.4 CM/SEC
BH CV XLRA MEAS RIGHT VERTEBRAL A PSV: 29.3 CM/SEC
LEFT ARM BP: NORMAL MMHG
RIGHT ARM BP: NORMAL MMHG

## 2017-06-27 PROCEDURE — 93880 EXTRACRANIAL BILAT STUDY: CPT

## 2017-06-27 RX ORDER — HYDROCHLOROTHIAZIDE 25 MG/1
25 TABLET ORAL DAILY
Qty: 30 TABLET | Refills: 11 | Status: SHIPPED | OUTPATIENT
Start: 2017-06-27 | End: 2017-12-01

## 2017-07-03 ENCOUNTER — TELEPHONE (OUTPATIENT)
Dept: CARDIOLOGY | Facility: CLINIC | Age: 67
End: 2017-07-03

## 2017-07-03 DIAGNOSIS — I10 ESSENTIAL HYPERTENSION: Primary | ICD-10-CM

## 2017-07-03 RX ORDER — COLCHICINE 0.6 MG/1
TABLET ORAL
Qty: 6 TABLET | Refills: 5 | Status: SHIPPED | OUTPATIENT
Start: 2017-07-03 | End: 2017-07-24

## 2017-07-03 RX ORDER — SPIRONOLACTONE 25 MG/1
12.5 TABLET ORAL DAILY
Qty: 30 TABLET | Refills: 3 | Status: SHIPPED | OUTPATIENT
Start: 2017-07-03 | End: 2017-07-24

## 2017-07-03 NOTE — TELEPHONE ENCOUNTER
Patient sent email last Thursday. I am just now seeing email. Increase in HCTZ to 25 mg daily caused pt to have a gout flare up. He decreased hctz to 12.5 mg daily. High doses of amlodipine cause him to have edema. Average blood pressure still 160/88.    Dr. Saleh, do you have recommendations on med to add to his regimen?    Toprol 50 mg bid (HR is below 60)  Benazepril 40 mg daily  Amlodipine 2.5 mg daily (increased dose caused lower ext edema)  HCTZ 12.5 mg daily (increased dose caused gout)

## 2017-07-03 NOTE — TELEPHONE ENCOUNTER
Patient prefers to be called at his office, which I believe to be closed today.  I sent the patient the following email.    I am sure your office is closed today and I will be out the rest of the week. I have discussed your case with Dr. Saleh. We would advise to add an additional medication, Spironolactone, to help with your blood pressure. This would be spironolactone 12.5 mg daily in the morning. You will need to get lab work checked in 10 days to check on your potassium levels. As far as the gout, I will send in a prescription for colchicine. Colchicine helps with elevated uric acid where allopurinol is for maintenance. You can take the colchicine for 3 days.    Thanks,   Lety Ryan

## 2017-07-10 NOTE — TELEPHONE ENCOUNTER
Called and s/w pt. Pt states that you told him to f/u with RM in couple a weeks. Do you want the pt to come  in this month or next month...please advise    Thanks  Nohemi NOGUERA

## 2017-07-10 NOTE — TELEPHONE ENCOUNTER
Pt still having issues with hypertension. We started sprironolactone last week. Will check a bmp next week. He would like to f/u with Dr. Saleh to discuss his bp issues. Will get in over the next month.

## 2017-07-24 ENCOUNTER — OFFICE VISIT (OUTPATIENT)
Dept: CARDIOLOGY | Facility: CLINIC | Age: 67
End: 2017-07-24

## 2017-07-24 VITALS
BODY MASS INDEX: 32.1 KG/M2 | HEIGHT: 72 IN | SYSTOLIC BLOOD PRESSURE: 142 MMHG | DIASTOLIC BLOOD PRESSURE: 90 MMHG | WEIGHT: 237 LBS | HEART RATE: 64 BPM

## 2017-07-24 DIAGNOSIS — I63.312 CEREBRAL INFARCTION DUE TO THROMBOSIS OF LEFT MIDDLE CEREBRAL ARTERY (HCC): ICD-10-CM

## 2017-07-24 DIAGNOSIS — I10 ESSENTIAL HYPERTENSION: Primary | ICD-10-CM

## 2017-07-24 DIAGNOSIS — E78.5 HYPERLIPIDEMIA, UNSPECIFIED HYPERLIPIDEMIA TYPE: ICD-10-CM

## 2017-07-24 PROCEDURE — 93000 ELECTROCARDIOGRAM COMPLETE: CPT | Performed by: INTERNAL MEDICINE

## 2017-07-24 PROCEDURE — 99214 OFFICE O/P EST MOD 30 MIN: CPT | Performed by: INTERNAL MEDICINE

## 2017-07-24 RX ORDER — SPIRONOLACTONE 25 MG/1
25 TABLET ORAL DAILY
Qty: 90 TABLET | Refills: 3 | Status: SHIPPED | OUTPATIENT
Start: 2017-07-24 | End: 2019-09-03 | Stop reason: SDUPTHER

## 2017-07-24 NOTE — PROGRESS NOTES
Date of Office Visit: 2017  Encounter Provider: Aliza Saleh MD  Place of Service: [facility] CARDIOLOGY  Patient Name: Dustin Wilkerson  :1950      Patient ID:  Dustin Wilkerson is a 66 y.o. male is here for  followup for         History of Present Illness  He was referred for falls and hypertension. He saw Dr. Mahoney from 2017.  At that visit, he was seen for three unexplained falls where he will suddenly fall without warning.  He has had an injury associated with this and was given a referral to cardiology and neurology.  He has a history of essential hypertension, gout, and hyperlipidemia.  I do not see any other medical records at this point.        We did check orthostatic blood pressures in the office and he is not orthostatic.       In talking with him today, he gives a history that, in 2016, he was walking out in his yard, went over to turn the hose off, and fell.  He found himself on the ground.  He is not sure if he passed out and he had a right arm injury, which was moderately severe.  He was able to continue.  He said he did not feel his heart racing or skipping before this.  He had no dizziness.  He had no chest pain or pressure and no difficulty breathing.  He just did not really know what happened, and he has been a lifelong exerciser and has never had any symptoms with exercise.  On 2016, he had taken his medications.  It was late in the evening (11:00 at night), and he went onto his front porch and, the next thing he knew, he found himself on the ground.  He is not sure that he had any syncope.  In fact, he thinks he did not; however, he sustained a severe right leg injury and had to have multiple surgeries to repair all of his quadriceps tendons on his right leg.  He does not know why this happened.  Again, before this, he had no chest pain or difficulty breathing.  In talking with him, he is on numerous medications and he takes  all of them at night, and he had taken all of them right before this second incident in 12/2016.  Because his injury was so severe, he has not walked since 12/02/2016.  With his surgeries, he was in a wheelchair for a while and then he was on a walker and now he is with a cane and has a big brace on his leg.      He has one caffeinated beverage a day and one 16-ounce bottle of water a day.  He has two children.  He is  and is the president of [employer].  In his family, there is no premature cardiovascular disease.  His mother had strokes.  He does not use any alcohol and he does not smoke.       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  This was in 08/2015.  He has seen Dr. Sunny Mcelroy from ENT about his hearing loss.   He saw Dimas Richmond who felt that the lacunar infarct was due to hypertension.  He also felt that the falls were due to a lumbar problem.  He had carotid duplex done 6/2017 which showed mild bilateral carotid stenosis.    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 Dr. Saleh started him on metoprolol. After starting him on metoprolol, the patient called in complaining of hypertension. He was started on hydrochlorothiazide 12.5 daily and his amlodipine was decreased. He was also having problems with leg swelling. His EF was 65%. He had mild dilation of the aortic root. He did have a CTA because of the mild dilation of the aortic root. 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 A 2.2 cm cystic lesion from the tail of the pancreas.      He is treated for hypertension and hyperlipidemia     He feels well at this time.  He's had no further falls but still has some mild lower extremity edema and  lower extremities weakness.  He doesn't feels heart racing or skipping.  He has no exertional chest tightness or pressure or difficulty breathing.  He feels like he is getting strength her back in his legs since the fall.  His BP is still high at home.              Past Medical History:   Diagnosis Date   • Essential hypertension    • Falls     3 unexplained   • Gout    • Hearing loss    • Hyperlipidemia    • Hypertension    • Snoring    • Syncope          Past Surgical History:   Procedure Laterality Date   • KNEE SURGERY         Current Outpatient Prescriptions on File Prior to Visit   Medication Sig Dispense Refill   • allopurinol (ZYLOPRIM) 100 MG tablet Take 1 tablet by mouth Daily.     • amLODIPine (NORVASC) 5 MG tablet Take 0.5 tablets by mouth Daily. 90 tablet 3   • aspirin 81 MG EC tablet Take 81 mg by mouth Daily.     • benazepril (LOTENSIN) 40 MG tablet Take 1 tablet by mouth Daily.     • gabapentin (NEURONTIN) 300 MG capsule Take 300 mg by mouth 4 (Four) Times a Day.     • hydrochlorothiazide (HYDRODIURIL) 25 MG tablet Take 1 tablet by mouth Daily. 30 tablet 11   • metoprolol succinate XL (TOPROL-XL) 50 MG 24 hr tablet Take 1 tablet by mouth 2 (Two) Times a Day. 60 tablet 3   • Multiple Vitamin (MULTI VITAMIN PO) Take 1 tablet by mouth Daily.     • pantoprazole (PROTONIX) 40 MG EC tablet Take 40 mg by mouth Daily.     • pravastatin (PRAVACHOL) 40 MG tablet Take 1 tablet by mouth Daily.     • spironolactone (ALDACTONE) 25 MG tablet Take 0.5 tablets by mouth Daily. 30 tablet 3   • tamsulosin (FLOMAX) 0.4 MG capsule 24 hr capsule Take 0.4 mg by mouth Daily.     • [DISCONTINUED] colchicine 0.6 MG tablet Take one tablet twice daily x 3 days when having episodes of gout. 6 tablet 5     No current facility-administered medications on file prior to visit.        Social History     Social History   • Marital status: Unknown     Spouse name: N/A   • Number of children: N/A   • Years of education: N/A  "    Occupational History   • Not on file.     Social History Main Topics   • Smoking status: Never Smoker   • Smokeless tobacco: Not on file      Comment: caffeine use   • Alcohol use No   • Drug use: No   • Sexual activity: Not on file     Other Topics Concern   • Not on file     Social History Narrative           Review of Systems   Constitution: Negative.   HENT: Negative for congestion and headaches.    Eyes: Negative for vision loss in left eye and vision loss in right eye.   Respiratory: Positive for snoring. Negative for cough, hemoptysis, shortness of breath, sleep disturbances due to breathing, sputum production and wheezing.    Endocrine: Negative.    Hematologic/Lymphatic: Negative.    Skin: Negative for poor wound healing and rash.   Musculoskeletal: Negative for falls, gout, muscle cramps and myalgias.   Gastrointestinal: Negative for abdominal pain, diarrhea, dysphagia, hematemesis, melena, nausea and vomiting.   Neurological: Negative for excessive daytime sleepiness, dizziness, light-headedness, loss of balance, seizures and vertigo.   Psychiatric/Behavioral: Negative for depression and substance abuse. The patient is not nervous/anxious.        Procedures    ECG 12 Lead  Date/Time: 7/24/2017 10:43 AM  Performed by: BRIANNA FORBES  Authorized by: BRIANNA FORBES   Comparison: compared with previous ECG   Similar to previous ECG  Rhythm: sinus rhythm  Clinical impression: normal ECG               Objective:      Vitals:    07/24/17 1006   BP: 142/90   Pulse: 64   Weight: 237 lb (108 kg)   Height: 72\" (182.9 cm)     Body mass index is 32.14 kg/(m^2).    Physical Exam   Constitutional: He is oriented to person, place, and time. He appears well-developed and well-nourished. No distress.   HENT:   Head: Normocephalic and atraumatic.   Eyes: Conjunctivae are normal. No scleral icterus.   Neck: Neck supple. No JVD present. Carotid bruit is not present. No thyromegaly present.   Cardiovascular: " Normal rate, regular rhythm, S1 normal, S2 normal, normal heart sounds and intact distal pulses.   No extrasystoles are present. PMI is not displaced.  Exam reveals no gallop.    No murmur heard.  Pulses:       Carotid pulses are 2+ on the right side, and 2+ on the left side.       Radial pulses are 2+ on the right side, and 2+ on the left side.        Dorsalis pedis pulses are 2+ on the right side, and 2+ on the left side.        Posterior tibial pulses are 2+ on the right side, and 2+ on the left side.   Pulmonary/Chest: Effort normal and breath sounds normal. No respiratory distress. He has no wheezes. He has no rhonchi. He has no rales. He exhibits no tenderness.   Abdominal: Soft. Bowel sounds are normal. He exhibits no distension, no abdominal bruit and no mass. There is no tenderness.   Musculoskeletal: He exhibits no edema or deformity.   Lymphadenopathy:     He has no cervical adenopathy.   Neurological: He is alert and oriented to person, place, and time. No cranial nerve deficit.   Skin: Skin is warm and dry. No rash noted. He is not diaphoretic. No cyanosis. No pallor. Nails show no clubbing.   Psychiatric: He has a normal mood and affect. Judgment normal.   Vitals reviewed.      Lab Review:       Assessment:      Diagnosis Plan   1. Essential hypertension     2. Hyperlipidemia, unspecified hyperlipidemia type     3. 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                  Two significant falls resulting in injury, both of them right-sided (right arm injury and severe right leg injury).    2.                   Hypertension, which has been under good control.    3.                   Hyperlipidemia for which he takes pravastatin.    4.                   Gout.    5. SVT on toprol and controlling it well  6.  Dilated aorta, recheck next year, continue b-blocker.   7. Right lumbar radicular pain, seeing Dr. Richmond.  Due to disc disease at L3-L4.      Plan:       See  back in 3 months, increase spironolactone to 25mg dialy.

## 2017-12-01 ENCOUNTER — OFFICE VISIT (OUTPATIENT)
Dept: CARDIOLOGY | Facility: CLINIC | Age: 67
End: 2017-12-01

## 2017-12-01 VITALS
SYSTOLIC BLOOD PRESSURE: 120 MMHG | DIASTOLIC BLOOD PRESSURE: 70 MMHG | HEIGHT: 72 IN | WEIGHT: 233 LBS | HEART RATE: 72 BPM | BODY MASS INDEX: 31.56 KG/M2

## 2017-12-01 DIAGNOSIS — I10 ESSENTIAL HYPERTENSION: Primary | ICD-10-CM

## 2017-12-01 DIAGNOSIS — E78.5 HYPERLIPIDEMIA, UNSPECIFIED HYPERLIPIDEMIA TYPE: ICD-10-CM

## 2017-12-01 PROCEDURE — 99214 OFFICE O/P EST MOD 30 MIN: CPT | Performed by: INTERNAL MEDICINE

## 2017-12-01 PROCEDURE — 93000 ELECTROCARDIOGRAM COMPLETE: CPT | Performed by: INTERNAL MEDICINE

## 2017-12-01 RX ORDER — HYDROCHLOROTHIAZIDE 25 MG/1
25 TABLET ORAL DAILY
Qty: 90 TABLET | Refills: 3 | Status: SHIPPED | OUTPATIENT
Start: 2017-12-01 | End: 2021-09-22

## 2017-12-01 RX ORDER — PANTOPRAZOLE SODIUM 40 MG/1
40 TABLET, DELAYED RELEASE ORAL DAILY
COMMUNITY
Start: 2016-10-21 | End: 2022-03-30

## 2017-12-01 NOTE — PROGRESS NOTES
Date of Office Visit: 2017  Encounter Provider: Aliza Saleh MD  Place of Service: [facility] CARDIOLOGY  Patient Name: Dustin Wilkerson  :1950      Patient ID:  Dustin Wilkerson is a 67 y.o. male is here for  followup for hypertension.         History of Present Illness    He was referred for falls and hypertension. He saw Dr. Mahoney from 2017.  At that visit, he was seen for three unexplained falls where he will suddenly fall without warning.  He has had an injury associated with this and was given a referral to cardiology and neurology.  He has a history of essential hypertension, gout, and hyperlipidemia.  I do not see any other medical records at this point.         We did check orthostatic blood pressures in the office and he is not orthostatic.       In 2016, he was walking out in his yard, went over to turn the hose off, and fell.  He found himself on the ground.  He is not sure if he passed out and he had a right arm injury, which was moderately severe.  He was able to continue.  He said he did not feel his heart racing or skipping before this.  He had no dizziness.  He had no chest pain or pressure and no difficulty breathing.  He just did not really know what happened, and he has been a lifelong exerciser and has never had any symptoms with exercise.  On 2016, he had taken his medications.  It was late in the evening (11:00 at night), and he went onto his front porch and, the next thing he knew, he found himself on the ground.  He is not sure that he had any syncope.  In fact, he thinks he did not; however, he sustained a severe right leg injury and had to have multiple surgeries to repair all of his quadriceps tendons on his right leg.  He does not know why this happened.  Again, before this, he had no chest pain or difficulty breathing.  In talking with him, he is on numerous medications and he takes all of them at night, and he had  taken all of them right before this second incident in 12/2016.  Because his injury was so severe, he has not walked since 12/02/2016.  With his surgeries, he was in a wheelchair for a while and then he was on a walker and now he is with a cane and has a big brace on his leg.       He has one caffeinated beverage a day and one 16-ounce bottle of water a day.  He has two children.  He is  and is the president of [employer].  In his family, there is no premature cardiovascular disease.  His mother had strokes.  He does not use any alcohol and he does not smoke.        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  This was in 08/2015.  He has seen Dr. Sunny Mcelroy from ENT about his hearing loss.   He saw Dimas Richmond who felt that the lacunar infarct was due to hypertension.  He also felt that the falls were due to a lumbar problem.  He had carotid duplex done 6/2017 which showed mild bilateral carotid stenosis.     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 Dr. Saleh started him on metoprolol. After starting him on metoprolol, the patient called in complaining of hypertension. He was started on hydrochlorothiazide 12.5 daily and his amlodipine was decreased. He was also having problems with leg swelling. His EF was 65%. He had mild dilation of the aortic root. He did have a CTA because of the mild dilation of the aortic root. 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 A 2.2 cm cystic lesion from the tail of the pancreas.       He is treated for hypertension and hyperlipidemia      He is doing office time.  He's had no further syncope.  He's had no chest pain or pressure.  He has a difficult breathing.  His blood  pressure, still running 1:30 to 150/7885.  It's mostly high in the afternoon evening.  He has not felt his heart racing or skipping.  He's had no dizziness.       Past Medical History:   Diagnosis Date   • Essential hypertension    • Falls     3 unexplained   • Gout    • Hearing loss    • Hyperlipidemia    • Hypertension    • Snoring    • Syncope          Past Surgical History:   Procedure Laterality Date   • KNEE SURGERY         Current Outpatient Prescriptions on File Prior to Visit   Medication Sig Dispense Refill   • allopurinol (ZYLOPRIM) 100 MG tablet Take 1 tablet by mouth Daily.     • amLODIPine (NORVASC) 5 MG tablet Take 0.5 tablets by mouth Daily. 90 tablet 3   • aspirin 81 MG EC tablet Take 81 mg by mouth Daily.     • benazepril (LOTENSIN) 40 MG tablet Take 1 tablet by mouth Daily.     • gabapentin (NEURONTIN) 300 MG capsule Take 300 mg by mouth 4 (Four) Times a Day.     • hydrochlorothiazide (HYDRODIURIL) 25 MG tablet Take 1 tablet by mouth Daily. (Patient taking differently: Take 12.5 mg by mouth Daily.) 30 tablet 11   • metoprolol succinate XL (TOPROL-XL) 50 MG 24 hr tablet Take 1 tablet by mouth 2 (Two) Times a Day. 60 tablet 3   • Multiple Vitamin (MULTI VITAMIN PO) Take 1 tablet by mouth Daily.     • pravastatin (PRAVACHOL) 40 MG tablet Take 1 tablet by mouth Daily.     • spironolactone (ALDACTONE) 25 MG tablet Take 1 tablet by mouth Daily. 90 tablet 3   • tamsulosin (FLOMAX) 0.4 MG capsule 24 hr capsule Take 0.4 mg by mouth Daily.       No current facility-administered medications on file prior to visit.        Social History     Social History   • Marital status: Unknown     Spouse name: N/A   • Number of children: N/A   • Years of education: N/A     Occupational History   • Not on file.     Social History Main Topics   • Smoking status: Never Smoker   • Smokeless tobacco: Never Used      Comment: caffeine use   • Alcohol use No   • Drug use: No   • Sexual activity: Not on file     Other Topics  "Concern   • Not on file     Social History Narrative           Review of Systems   Constitution: Negative.   HENT: Positive for hearing loss. Negative for congestion.    Eyes: Negative for vision loss in left eye and vision loss in right eye.   Respiratory: Positive for snoring. Negative for cough, hemoptysis, shortness of breath, sleep disturbances due to breathing, sputum production and wheezing.    Endocrine: Negative.    Hematologic/Lymphatic: Negative.    Skin: Negative for poor wound healing and rash.   Musculoskeletal: Negative for falls, gout, muscle cramps and myalgias.   Gastrointestinal: Negative for abdominal pain, diarrhea, dysphagia, hematemesis, melena, nausea and vomiting.   Neurological: Negative for excessive daytime sleepiness, dizziness, headaches, light-headedness, loss of balance, seizures and vertigo.   Psychiatric/Behavioral: Negative for depression and substance abuse. The patient is not nervous/anxious.        Procedures    ECG 12 Lead  Date/Time: 12/1/2017 2:38 PM  Performed by: BRIANNA FORBES  Authorized by: BRIANNA FORBES   Comparison: compared with previous ECG   Similar to previous ECG  Rhythm: sinus rhythm  Clinical impression: normal ECG               Objective:      Vitals:    12/01/17 1426   BP: 120/70   BP Location: Right arm   Patient Position: Sitting   Pulse: 72   Weight: 233 lb (106 kg)   Height: 72\" (182.9 cm)     Body mass index is 31.6 kg/(m^2).    Physical Exam   Constitutional: He is oriented to person, place, and time. He appears well-developed and well-nourished. No distress.   HENT:   Head: Normocephalic and atraumatic.   Eyes: Conjunctivae are normal. No scleral icterus.   Neck: Neck supple. No JVD present. Carotid bruit is not present. No thyromegaly present.   Cardiovascular: Normal rate, regular rhythm, S1 normal, S2 normal, normal heart sounds and intact distal pulses.   No extrasystoles are present. PMI is not displaced.  Exam reveals no gallop.    No " murmur heard.  Pulses:       Carotid pulses are 2+ on the right side, and 2+ on the left side.       Radial pulses are 2+ on the right side, and 2+ on the left side.        Dorsalis pedis pulses are 2+ on the right side, and 2+ on the left side.        Posterior tibial pulses are 2+ on the right side, and 2+ on the left side.   Pulmonary/Chest: Effort normal and breath sounds normal. No respiratory distress. He has no wheezes. He has no rhonchi. He has no rales. He exhibits no tenderness.   Abdominal: Soft. Bowel sounds are normal. He exhibits no distension, no abdominal bruit and no mass. There is no tenderness.   Musculoskeletal: He exhibits no edema or deformity.   Lymphadenopathy:     He has no cervical adenopathy.   Neurological: He is alert and oriented to person, place, and time. No cranial nerve deficit.   Skin: Skin is warm and dry. No rash noted. He is not diaphoretic. No cyanosis. No pallor. Nails show no clubbing.   Psychiatric: He has a normal mood and affect. Judgment normal.   Vitals reviewed.      Lab Review:       Assessment:      Diagnosis Plan   1. Essential hypertension     2. Hyperlipidemia, unspecified hyperlipidemia type        1.                  Two significant falls resulting in injury, both of them right-sided (right arm injury and severe right leg injury).    2.                   Hypertension, which has been under good control.    3.                   Hyperlipidemia for which he takes pravastatin.    4.                   Gout.    5. SVT on toprol and controlling it well  6.  Dilated aorta, recheck next year, continue b-blocker.   7. Right lumbar radicular pain, seeing Dr. Richmond.  Due to disc disease at L3-L4.      Plan:       Take benazepril at noon and increase hctz to 25mg and take at noon.  May try to d/c amlodipine due to LE edema. After next visit, get gated CTA chest to measure aorta.

## 2018-01-02 RX ORDER — METOPROLOL SUCCINATE 50 MG/1
TABLET, EXTENDED RELEASE ORAL
Qty: 180 TABLET | Refills: 1 | Status: SHIPPED | OUTPATIENT
Start: 2018-01-02 | End: 2019-10-23 | Stop reason: SDUPTHER

## 2018-06-01 ENCOUNTER — OFFICE VISIT (OUTPATIENT)
Dept: CARDIOLOGY | Facility: CLINIC | Age: 68
End: 2018-06-01

## 2018-06-01 ENCOUNTER — TELEPHONE (OUTPATIENT)
Dept: CARDIOLOGY | Facility: CLINIC | Age: 68
End: 2018-06-01

## 2018-06-01 VITALS
DIASTOLIC BLOOD PRESSURE: 92 MMHG | SYSTOLIC BLOOD PRESSURE: 150 MMHG | HEIGHT: 72 IN | WEIGHT: 239.8 LBS | HEART RATE: 74 BPM | BODY MASS INDEX: 32.48 KG/M2

## 2018-06-01 DIAGNOSIS — E78.5 HYPERLIPIDEMIA, UNSPECIFIED HYPERLIPIDEMIA TYPE: ICD-10-CM

## 2018-06-01 DIAGNOSIS — I63.312 CEREBRAL INFARCTION DUE TO THROMBOSIS OF LEFT MIDDLE CEREBRAL ARTERY (HCC): ICD-10-CM

## 2018-06-01 DIAGNOSIS — I10 ESSENTIAL HYPERTENSION: Primary | ICD-10-CM

## 2018-06-01 DIAGNOSIS — I77.810 DILATED AORTIC ROOT (HCC): ICD-10-CM

## 2018-06-01 PROCEDURE — 93000 ELECTROCARDIOGRAM COMPLETE: CPT | Performed by: INTERNAL MEDICINE

## 2018-06-01 PROCEDURE — 99214 OFFICE O/P EST MOD 30 MIN: CPT | Performed by: INTERNAL MEDICINE

## 2018-06-01 NOTE — TELEPHONE ENCOUNTER
Pt stopped by the office very upset, said he received a phone call from the hospital wanting to set up CT of his chest and he said that you did not discuss this with him. That you said everything was fine with him and you would see him back in a year. Patient wanted to argue with me concerning this and I informed him that I was looking in your note from today and you did say you wanted him to have a CT for dilated aortic root.     Pt is not happy and said that you never mentioned this to him.

## 2018-06-01 NOTE — TELEPHONE ENCOUNTER
Try to contact the pt but he was not avaliable.     Per : Not urgent. It's been 1 year since his last ct angiogram.    Sarah Beth I'm sending this to you incase he return the phone call on Monday.     Thanks Gwen

## 2018-06-01 NOTE — PROGRESS NOTES
Date of Office Visit: 2018  Encounter Provider: Aliza Saleh MD  Place of Service: [facility] CARDIOLOGY  Patient Name: Dustin Wilkerson  :1950      Patient ID:  Dustin Wilkerson is a 67 y.o. male is here for  followup for hypertension.         History of Present Illness       He was referred for falls and hypertension. He saw Dr. Mahoney from 2017.  At that visit, he was seen for three unexplained falls where he will suddenly fall without warning.  He has had an injury associated with this and was given a referral to cardiology and neurology.  He has a history of essential hypertension, gout, and hyperlipidemia.  I do not see any other medical records at this point.         We did check orthostatic blood pressures in the office and he is not orthostatic.       In 2016, he was walking out in his yard, went over to turn the hose off, and fell.  He found himself on the ground.  He is not sure if he passed out and he had a right arm injury, which was moderately severe.  He was able to continue.  He said he did not feel his heart racing or skipping before this.  He had no dizziness.  He had no chest pain or pressure and no difficulty breathing.  He just did not really know what happened, and he has been a lifelong exerciser and has never had any symptoms with exercise.  On 2016, he had taken his medications.  It was late in the evening (11:00 at night), and he went onto his front porch and, the next thing he knew, he found himself on the ground.  He is not sure that he had any syncope.  In fact, he thinks he did not; however, he sustained a severe right leg injury and had to have multiple surgeries to repair all of his quadriceps tendons on his right leg.  He does not know why this happened.  Again, before this, he had no chest pain or difficulty breathing.  In talking with him, he is on numerous medications and he takes all of them at night, and he  had taken all of them right before this second incident in 12/2016.  Because his injury was so severe, he has not walked since 12/02/2016.  With his surgeries, he was in a wheelchair for a while and then he was on a walker and now he is with a cane and has a big brace on his leg.       He has one caffeinated beverage a day and one 16-ounce bottle of water a day.  He has two children.  He is  and is the president of [employer].  In his family, there is no premature cardiovascular disease.  His mother had strokes.  He does not use any alcohol and he does not smoke.        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  This was in 08/2015.  He has seen Dr. Sunny Mcelroy from ENT about his hearing loss.   He saw Dimas Richmond who felt that the lacunar infarct was due to hypertension.  He also felt that the falls were due to a lumbar problem.  He had carotid duplex done 6/2017 which showed mild bilateral carotid stenosis.     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 After starting him on metoprolol for SVT, the patient called in complaining of hypertension. He was started on hydrochlorothiazide 12.5 daily and his amlodipine was decreased. He was also having problems with leg swelling. His EF was 65%. He had mild dilation of the aortic root. He did have a CTA because of the mild dilation of the aortic root. 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 A 2.2 cm cystic lesion from the tail of the pancreas.       He is treated for hypertension and hyperlipidemia      His blood pressure has been well-controlled.  He's had a rough day and driven up his blood pressure.  He's been getting 120/70.  He's had no chest pain or  pressure.  He has no difficulty breathing.  He has no tachycardia, dizziness or syncope.  He is talking his medications while by he's having lower extremity edema still with a lower dose amlodipine.  He is not sure that he once to discontinue amlodipine because his blood pressures been so well controlled.    Past Medical History:   Diagnosis Date   • Essential hypertension    • Falls     3 unexplained   • Gout    • Hearing loss    • Hyperlipidemia    • Hypertension    • Snoring    • Syncope          Past Surgical History:   Procedure Laterality Date   • KNEE SURGERY         Current Outpatient Prescriptions on File Prior to Visit   Medication Sig Dispense Refill   • allopurinol (ZYLOPRIM) 100 MG tablet Take 1 tablet by mouth Daily.     • amLODIPine (NORVASC) 5 MG tablet Take 0.5 tablets by mouth Daily. 90 tablet 3   • aspirin 81 MG EC tablet Take 81 mg by mouth Daily.     • benazepril (LOTENSIN) 40 MG tablet Take 1 tablet by mouth Daily.     • gabapentin (NEURONTIN) 300 MG capsule Take 300 mg by mouth 4 (Four) Times a Day.     • hydrochlorothiazide (HYDRODIURIL) 25 MG tablet Take 1 tablet by mouth Daily. 90 tablet 3   • metoprolol succinate XL (TOPROL-XL) 50 MG 24 hr tablet TAKE ONE TABLET BY MOUTH TWICE A  tablet 1   • Multiple Vitamin (MULTI VITAMIN PO) Take 1 tablet by mouth Daily.     • pantoprazole (PROTONIX) 40 MG EC tablet Take 40 mg by mouth Daily.     • pravastatin (PRAVACHOL) 40 MG tablet Take 1 tablet by mouth Daily.     • spironolactone (ALDACTONE) 25 MG tablet Take 1 tablet by mouth Daily. 90 tablet 3   • tamsulosin (FLOMAX) 0.4 MG capsule 24 hr capsule Take 0.4 mg by mouth Daily.       No current facility-administered medications on file prior to visit.        Social History     Social History   • Marital status: Unknown     Spouse name: N/A   • Number of children: N/A   • Years of education: N/A     Occupational History   • Not on file.     Social History Main Topics   • Smoking status: Never  "Smoker   • Smokeless tobacco: Never Used      Comment: caffeine use   • Alcohol use No   • Drug use: No   • Sexual activity: Not on file     Other Topics Concern   • Not on file     Social History Narrative   • No narrative on file           Review of Systems   Constitution: Negative.   HENT: Positive for hearing loss. Negative for congestion.    Eyes: Negative for vision loss in left eye and vision loss in right eye.   Respiratory: Negative.  Negative for cough, hemoptysis, shortness of breath, sleep disturbances due to breathing, snoring, sputum production and wheezing.    Endocrine: Negative.    Hematologic/Lymphatic: Negative.    Skin: Negative for poor wound healing and rash.   Musculoskeletal: Positive for joint swelling. Negative for falls, gout, muscle cramps and myalgias.   Gastrointestinal: Negative for abdominal pain, diarrhea, dysphagia, hematemesis, melena, nausea and vomiting.   Neurological: Negative for excessive daytime sleepiness, dizziness, headaches, light-headedness, loss of balance, seizures and vertigo.   Psychiatric/Behavioral: Negative for depression and substance abuse. The patient is not nervous/anxious.        Procedures    ECG 12 Lead  Date/Time: 6/1/2018 3:21 PM  Performed by: BRIANNA FORBES  Authorized by: BRIANNA FORBES   Comparison: compared with previous ECG   Similar to previous ECG  Rhythm: sinus rhythm  QRS axis: left  Clinical impression: non-specific ECG                Objective:      Vitals:    06/01/18 1510   BP: 150/92   BP Location: Right arm   Patient Position: Sitting   Pulse: 74   Weight: 109 kg (239 lb 12.8 oz)   Height: 182.9 cm (72\")     Body mass index is 32.52 kg/m².    Physical Exam   Constitutional: He is oriented to person, place, and time. He appears well-developed and well-nourished. No distress.   HENT:   Head: Normocephalic and atraumatic.   Eyes: Conjunctivae are normal. No scleral icterus.   Neck: Neck supple. No JVD present. Carotid bruit is " not present. No thyromegaly present.   Cardiovascular: Normal rate, regular rhythm, S1 normal, S2 normal, normal heart sounds and intact distal pulses.   No extrasystoles are present. PMI is not displaced.  Exam reveals no gallop.    No murmur heard.  Pulses:       Carotid pulses are 2+ on the right side, and 2+ on the left side.       Radial pulses are 2+ on the right side, and 2+ on the left side.        Dorsalis pedis pulses are 2+ on the right side, and 2+ on the left side.        Posterior tibial pulses are 2+ on the right side, and 2+ on the left side.   Pulmonary/Chest: Effort normal and breath sounds normal. No respiratory distress. He has no wheezes. He has no rhonchi. He has no rales. He exhibits no tenderness.   Abdominal: Soft. Bowel sounds are normal. He exhibits no distension, no abdominal bruit and no mass. There is no tenderness.   Musculoskeletal: He exhibits no edema or deformity.   Lymphadenopathy:     He has no cervical adenopathy.   Neurological: He is alert and oriented to person, place, and time. No cranial nerve deficit.   Skin: Skin is warm and dry. No rash noted. He is not diaphoretic. No cyanosis. No pallor. Nails show no clubbing.   Psychiatric: He has a normal mood and affect. Judgment normal.   Vitals reviewed.      Lab Review:       Assessment:      Diagnosis Plan   1. Essential hypertension     2. 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 Hyperlipidemia, unspecified hyperlipidemia type        1.                  Two significant falls resulting in injury, both of them right-sided (right arm injury and severe right leg injury).    2.                   Hypertension, which has been under good control.    3.                   Hyperlipidemia for which he takes pravastatin.    4.                   Gout.    5. SVT on toprol and controlling it well  6.  Dilated aorta, recheck next year, continue b-blocker.   7. Right lumbar radicular pain, seeing Dr. Richmond.   Due to disc disease at L3-L4.      Plan:       Hold amlodipine to for 5 days to see if edema is better and if it is, consider procardia.  See back in 1 year.  BP has been well controlled.     Set up repeat CTA chest for dilated aorta.

## 2018-06-29 ENCOUNTER — APPOINTMENT (OUTPATIENT)
Dept: CT IMAGING | Facility: HOSPITAL | Age: 68
End: 2018-06-29
Attending: INTERNAL MEDICINE

## 2018-06-29 ENCOUNTER — HOSPITAL ENCOUNTER (OUTPATIENT)
Dept: CT IMAGING | Facility: HOSPITAL | Age: 68
Discharge: HOME | End: 2018-06-29
Attending: INTERNAL MEDICINE | Admitting: INTERNAL MEDICINE

## 2018-06-29 LAB — CREAT BLDA-MCNC: 1.4 MG/DL (ref 0.6–1.3)

## 2018-06-29 PROCEDURE — 0 IOPAMIDOL PER 1 ML: Performed by: INTERNAL MEDICINE

## 2018-06-29 PROCEDURE — 71275 CT ANGIOGRAPHY CHEST: CPT

## 2018-06-29 PROCEDURE — 82565 ASSAY OF CREATININE: CPT

## 2018-06-29 RX ADMIN — IOPAMIDOL 95 ML: 755 INJECTION, SOLUTION INTRAVENOUS at 10:13

## 2019-06-17 ENCOUNTER — TELEPHONE (OUTPATIENT)
Dept: CARDIOLOGY | Facility: CLINIC | Age: 69
End: 2019-06-17

## 2019-06-17 NOTE — TELEPHONE ENCOUNTER
Pt calls to report that he had a message show up in my chart advising that he was due for a AAA screening. He wanted to know if this was necessary and when he was due to see you next      Assessment:        Diagnosis Plan   1. Essential hypertension      2. 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 Hyperlipidemia, unspecified hyperlipidemia type          1.                  Two significant falls resulting in injury, both of them right-sided (right arm injury and severe right leg injury).    2.                   Hypertension, which has been under good control.    3.                   Hyperlipidemia for which he takes pravastatin.    4.                   Gout.    5. SVT on toprol and controlling it well  6.  Dilated aorta, recheck next year, continue b-blocker.   7. Right lumbar radicular pain, seeing Dr. Richmond.  Due to disc disease at L3-L4.      Plan:       Hold amlodipine to for 5 days to see if edema is better and if it is, consider procardia.  See back in 1 year.  BP has been well controlled.      Set up repeat CTA chest for dilated aorta.

## 2019-06-18 DIAGNOSIS — I77.810 ASCENDING AORTA DILATATION (HCC): Primary | ICD-10-CM

## 2019-06-27 ENCOUNTER — HOSPITAL ENCOUNTER (OUTPATIENT)
Dept: CT IMAGING | Facility: HOSPITAL | Age: 69
Discharge: HOME | End: 2019-06-27
Admitting: INTERNAL MEDICINE

## 2019-06-27 DIAGNOSIS — I77.810 ASCENDING AORTA DILATATION (HCC): ICD-10-CM

## 2019-06-27 LAB — CREAT BLDA-MCNC: 1.5 MG/DL (ref 0.6–1.3)

## 2019-06-27 PROCEDURE — 0 IOPAMIDOL PER 1 ML: Performed by: INTERNAL MEDICINE

## 2019-06-27 PROCEDURE — 82565 ASSAY OF CREATININE: CPT

## 2019-06-27 PROCEDURE — 71275 CT ANGIOGRAPHY CHEST: CPT

## 2019-06-27 RX ADMIN — IOPAMIDOL 95 ML: 755 INJECTION, SOLUTION INTRAVENOUS at 11:24

## 2019-07-01 ENCOUNTER — TELEPHONE (OUTPATIENT)
Dept: CARDIOLOGY | Facility: CLINIC | Age: 69
End: 2019-07-01

## 2019-07-01 NOTE — TELEPHONE ENCOUNTER
Unable to reach the patient, will continue to try  Shivani Hernandez RN  Triage nurse  ----- Message from Aliza Saleh MD sent at 7/1/2019  8:33 AM EDT -----  Stable aortic size - stable ct

## 2019-07-01 NOTE — TELEPHONE ENCOUNTER
----- Message from Aliza Saleh MD sent at 7/1/2019  8:33 AM EDT -----  Stable aortic size - stable ct

## 2019-07-02 ENCOUNTER — INPATIENT HOSPITAL (OUTPATIENT)
Dept: URBAN - METROPOLITAN AREA HOSPITAL 107 | Facility: HOSPITAL | Age: 69
End: 2019-07-02

## 2019-07-02 DIAGNOSIS — K80.20 CALCULUS OF GALLBLADDER WITHOUT CHOLECYSTITIS WITHOUT OBSTRU: ICD-10-CM

## 2019-07-02 DIAGNOSIS — K86.2 CYST OF PANCREAS: ICD-10-CM

## 2019-07-02 PROCEDURE — 43238 EGD US FINE NEEDLE BX/ASPIR: CPT

## 2019-07-11 NOTE — TELEPHONE ENCOUNTER
Pt is returning your call. He can be reached at [phone]. I tried to call him back but no answer.....Nohemi BONDS

## 2019-09-04 RX ORDER — SPIRONOLACTONE 25 MG/1
TABLET ORAL
Qty: 90 TABLET | Refills: 0 | Status: SHIPPED | OUTPATIENT
Start: 2019-09-04 | End: 2020-01-02

## 2019-09-09 ENCOUNTER — OFFICE VISIT (OUTPATIENT)
Dept: CARDIOLOGY | Facility: CLINIC | Age: 69
End: 2019-09-09

## 2019-09-09 VITALS
SYSTOLIC BLOOD PRESSURE: 130 MMHG | BODY MASS INDEX: 31.6 KG/M2 | HEIGHT: 72 IN | WEIGHT: 233.3 LBS | HEART RATE: 66 BPM | DIASTOLIC BLOOD PRESSURE: 82 MMHG

## 2019-09-09 DIAGNOSIS — I63.312 CEREBRAL INFARCTION DUE TO THROMBOSIS OF LEFT MIDDLE CEREBRAL ARTERY (HCC): ICD-10-CM

## 2019-09-09 DIAGNOSIS — I10 ESSENTIAL HYPERTENSION: Primary | ICD-10-CM

## 2019-09-09 DIAGNOSIS — E78.5 HYPERLIPIDEMIA, UNSPECIFIED HYPERLIPIDEMIA TYPE: ICD-10-CM

## 2019-09-09 PROCEDURE — 99214 OFFICE O/P EST MOD 30 MIN: CPT | Performed by: INTERNAL MEDICINE

## 2019-09-09 PROCEDURE — 93000 ELECTROCARDIOGRAM COMPLETE: CPT | Performed by: INTERNAL MEDICINE

## 2019-09-09 RX ORDER — LEVOTHYROXINE SODIUM 0.1 MG/1
TABLET ORAL
COMMUNITY
Start: 2018-11-30

## 2019-09-09 RX ORDER — SILDENAFIL CITRATE 20 MG/1
20 TABLET ORAL AS NEEDED
COMMUNITY
Start: 2015-07-21

## 2019-09-09 NOTE — PROGRESS NOTES
Date of Office Visit: 2019  Encounter Provider: Aliza Saleh MD  Place of Service: [facility] CARDIOLOGY  Patient Name: Dustin Wilkerson  :1950      Patient ID:  Dustin Wilkerson is a 69 y.o. male is here for  followup for hypertension and dilated ascending aorta.         History of Present Illness       He was referred for falls and hypertension. He saw Dr. Mahoney from 2017.  At that visit, he was seen for three unexplained falls where he will suddenly fall without warning.  He has had an injury associated with this.  He has a history of essential hypertension, gout, and hyperlipidemia.  We did check orthostatic blood pressures in the office and he is not orthostatic.       In 2016, he was walking out in his yard, went over to turn the hose off, and fell.  He found himself on the ground.  He is not sure if he passed out and he had a right arm injury, which was moderately severe.  He was able to continue.  He said he did not feel his heart racing or skipping before this.  He had no dizziness.  He had no chest pain or pressure and no difficulty breathing.  He just did not really know what happened, and he has been a lifelong exerciser and has never had any symptoms with exercise.  On 2016, he had taken his medications.  It was late in the evening (11:00 at night), and he went onto his front porch and, the next thing he knew, he found himself on the ground.  He is not sure that he had any syncope.  In fact, he thinks he did not; however, he sustained a severe right leg injury and had to have multiple surgeries to repair all of his quadriceps tendons on his right leg.  He does not know why this happened.  Again, before this, he had no chest pain or difficulty breathing.  In talking with him, he is on numerous medications and he takes all of them at night, and he had taken all of them right before this second incident in 2016.  Because his injury  was so severe, he has not walked since 12/02/2016.  With his surgeries, he was in a wheelchair for a while and then he was on a walker and now he is with a cane and has a big brace on his leg.       He has one caffeinated beverage a day and one 16-ounce bottle of water a day.  He has two children.  He is  and is the president of [employer].  In his family, there is no premature cardiovascular disease.  His mother had strokes.  He does not use any alcohol and he does not smoke.        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  This was in 08/2015.  He has seen Dr. Sunny Mcelroy from ENT about his hearing loss.   He saw Dimas Richmond who felt that the lacunar infarct was due to hypertension.  He also felt that the falls were due to a lumbar problem.  He had carotid duplex done 6/2017 which showed mild bilateral carotid stenosis.     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 After starting him on metoprolol for SVT, the patient called in complaining of hypertension. He was started on hydrochlorothiazide 12.5 daily and his amlodipine was decreased. He was also having problems with leg swelling. His EF was 65%. He had mild dilation of the aortic root. He did have a CTA because of the mild dilation of the aortic root. 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 A 2.2 cm cystic lesion from the tail of the pancreas.     Labs done 7/5/2018 show normal CMP, hemoglobin A1c 6.2, TSH elevated at 9.99, HDL 35, .  CT angiogram of the chest done 6/27/2019 show the aortic root at 4.4 cm, sinotubular junction 3.8 cm, a sending aorta 4.3 cm in the tubular portion.  This was unchanged from prior studies done in June 2018 in May  2017.    He has no chest pain, dyspnea, dizziness, tachycardia, syncope, falls.  He is taking his medications as directed.  He feels well.      Past Medical History:   Diagnosis Date   • Essential hypertension    • Falls     3 unexplained   • Gout    • Hearing loss    • Hyperlipidemia    • Hypertension    • Snoring    • Syncope          Past Surgical History:   Procedure Laterality Date   • KNEE SURGERY         Current Outpatient Medications on File Prior to Visit   Medication Sig Dispense Refill   • allopurinol (ZYLOPRIM) 100 MG tablet Take 1 tablet by mouth Daily.     • amLODIPine (NORVASC) 5 MG tablet Take 0.5 tablets by mouth Daily. 90 tablet 3   • aspirin 81 MG EC tablet Take 81 mg by mouth Daily.     • benazepril (LOTENSIN) 40 MG tablet Take 1 tablet by mouth Daily.     • hydrochlorothiazide (HYDRODIURIL) 25 MG tablet Take 1 tablet by mouth Daily. 90 tablet 3   • levothyroxine (SYNTHROID, LEVOTHROID) 100 MCG tablet TAKE ONE TABLET BY MOUTH DAILY     • metoprolol succinate XL (TOPROL-XL) 50 MG 24 hr tablet TAKE ONE TABLET BY MOUTH TWICE A  tablet 1   • Multiple Vitamin (MULTI VITAMIN PO) Take 1 tablet by mouth Daily.     • pantoprazole (PROTONIX) 40 MG EC tablet Take 40 mg by mouth Daily.     • pravastatin (PRAVACHOL) 40 MG tablet Take 1 tablet by mouth Daily.     • sildenafil (REVATIO) 20 MG tablet Take 20 mg by mouth As Needed.     • spironolactone (ALDACTONE) 25 MG tablet TAKE ONE TABLET BY MOUTH DAILY 90 tablet 0   • tamsulosin (FLOMAX) 0.4 MG capsule 24 hr capsule Take 0.4 mg by mouth Daily.     • [DISCONTINUED] gabapentin (NEURONTIN) 300 MG capsule Take 300 mg by mouth 4 (Four) Times a Day.       No current facility-administered medications on file prior to visit.        Social History     Socioeconomic History   • Marital status:      Spouse name: Not on file   • Number of children: Not on file   • Years of education: Not on file   • Highest education level: Not on file   Tobacco Use   •  "Smoking status: Never Smoker   • Smokeless tobacco: Never Used   • Tobacco comment: caffeine use   Substance and Sexual Activity   • Alcohol use: No   • Drug use: No           Review of Systems   Constitution: Negative.   HENT: Negative for congestion.    Eyes: Negative for vision loss in left eye and vision loss in right eye.   Respiratory: Negative.  Negative for cough, hemoptysis, shortness of breath, sleep disturbances due to breathing, snoring, sputum production and wheezing.    Endocrine: Negative.    Hematologic/Lymphatic: Negative.    Skin: Negative for poor wound healing and rash.   Musculoskeletal: Negative for falls, gout, muscle cramps and myalgias.   Gastrointestinal: Negative for abdominal pain, diarrhea, dysphagia, hematemesis, melena, nausea and vomiting.   Neurological: Negative for excessive daytime sleepiness, dizziness, headaches, light-headedness, loss of balance, seizures and vertigo.   Psychiatric/Behavioral: Negative for depression and substance abuse. The patient is not nervous/anxious.        Procedures    ECG 12 Lead  Date/Time: 9/9/2019 10:22 AM  Performed by: Aliza Saleh MD  Authorized by: Aliza Saleh MD   Comparison: compared with previous ECG   Similar to previous ECG  Rhythm: sinus rhythm    Clinical impression: normal ECG                Objective:      Vitals:    09/09/19 0959   BP: 130/82   Pulse: 66   Weight: 106 kg (233 lb 4.8 oz)   Height: 182.9 cm (72\")     Body mass index is 31.64 kg/m².    Physical Exam   Constitutional: He is oriented to person, place, and time. He appears well-developed and well-nourished. No distress.   HENT:   Head: Normocephalic and atraumatic.   Eyes: Conjunctivae are normal. No scleral icterus.   Neck: Neck supple. No JVD present. Carotid bruit is not present. No thyromegaly present.   Cardiovascular: Normal rate, regular rhythm, S1 normal, S2 normal, normal heart sounds and intact distal pulses.  No extrasystoles are present. PMI is " not displaced. Exam reveals no gallop.   No murmur heard.  Pulses:       Carotid pulses are 2+ on the right side, and 2+ on the left side.       Radial pulses are 2+ on the right side, and 2+ on the left side.        Dorsalis pedis pulses are 2+ on the right side, and 2+ on the left side.        Posterior tibial pulses are 2+ on the right side, and 2+ on the left side.   Pulmonary/Chest: Effort normal and breath sounds normal. No respiratory distress. He has no wheezes. He has no rhonchi. He has no rales. He exhibits no tenderness.   Abdominal: Soft. Bowel sounds are normal. He exhibits no distension, no abdominal bruit and no mass. There is no tenderness.   Musculoskeletal: He exhibits no edema or deformity.   Lymphadenopathy:     He has no cervical adenopathy.   Neurological: He is alert and oriented to person, place, and time. No cranial nerve deficit.   Skin: Skin is warm and dry. No rash noted. He is not diaphoretic. No cyanosis. No pallor. Nails show no clubbing.   Psychiatric: He has a normal mood and affect. Judgment normal.   Vitals reviewed.      Lab Review:       Assessment:      Diagnosis Plan   1. Essential hypertension     2. Hyperlipidemia, unspecified hyperlipidemia type     3. 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 Two significant falls resulting in injury, both of them right-sided (right arm injury and severe right leg injury).    2. Hypertension, which has been under good control.    3. Hyperlipidemia for which he takes pravastatin.    4. Gout.    5. SVT on toprol and controlling it well  6. Dilated aorta, recheck next year, continue b-blocker.   7. Right lumbar radicular pain, seeing Dr. Richmond.  Due to disc disease at L3-L4.      Plan:       See back in 1 year, no changes, advised cardiovascular exercise.  We went over CT chest results. He will fax his labs to me.

## 2019-09-24 ENCOUNTER — PATIENT MESSAGE (OUTPATIENT)
Dept: CARDIOLOGY | Facility: CLINIC | Age: 69
End: 2019-09-24

## 2019-09-24 RX ORDER — AMLODIPINE BESYLATE 2.5 MG/1
2.5 TABLET ORAL DAILY
Qty: 90 TABLET | Refills: 3 | Status: SHIPPED | OUTPATIENT
Start: 2019-09-24 | End: 2019-10-22 | Stop reason: SDUPTHER

## 2019-09-24 NOTE — TELEPHONE ENCOUNTER
From: Dustin Wilkerson  To: Aliza Saleh MD  Sent: 9/24/2019 1:18 PM EDT  Subject: Prescription Question    Could I please get a refill for Amlodipine in the 2.5 mg dosage? My current prescription is 5 mg. but we changed to 2.5 a while back and it seems to be working well. Thank you  Dustin Wilkerson  President, [facility]  Offices in [address], [address] [place] [address]  Tel: [phone] Fax: [phone]  [email or web address]  Our First Class Services include:  * Travel Medicine and Vaccinations  * On-site Flu & Wellness Clinics  * Vaccine Credentialing  * Lab Testing including [place]

## 2019-10-22 RX ORDER — AMLODIPINE BESYLATE 2.5 MG/1
2.5 TABLET ORAL DAILY
Qty: 90 TABLET | Refills: 3 | Status: SHIPPED | OUTPATIENT
Start: 2019-10-22 | End: 2020-12-11

## 2019-10-23 RX ORDER — METOPROLOL SUCCINATE 50 MG/1
50 TABLET, EXTENDED RELEASE ORAL 2 TIMES DAILY
Qty: 180 TABLET | Refills: 3 | Status: SHIPPED | OUTPATIENT
Start: 2019-10-23 | End: 2020-10-27

## 2019-10-29 ENCOUNTER — TELEPHONE (OUTPATIENT)
Dept: CARDIOLOGY | Facility: CLINIC | Age: 69
End: 2019-10-29

## 2019-10-29 NOTE — TELEPHONE ENCOUNTER
Called and s/w pt re his abnormal creatinine result. Pt want to recheck his CMP again today or tomorrow.....Nohemi BNODS

## 2019-10-30 NOTE — TELEPHONE ENCOUNTER
Receive CMP result pt. Per Dr Saleh is normal and she states that maybe he was just dehydration that day.    Called and informed pt. verbally understood......Nohemi BONDS

## 2020-01-02 RX ORDER — SPIRONOLACTONE 25 MG/1
TABLET ORAL
Qty: 90 TABLET | Refills: 0 | Status: SHIPPED | OUTPATIENT
Start: 2020-01-02 | End: 2020-04-06

## 2020-04-06 RX ORDER — SPIRONOLACTONE 25 MG/1
TABLET ORAL
Qty: 90 TABLET | Refills: 3 | Status: SHIPPED | OUTPATIENT
Start: 2020-04-06 | End: 2021-04-07

## 2020-09-15 ENCOUNTER — OFFICE VISIT (OUTPATIENT)
Dept: CARDIOLOGY | Facility: CLINIC | Age: 70
End: 2020-09-15

## 2020-09-15 VITALS
BODY MASS INDEX: 32.51 KG/M2 | SYSTOLIC BLOOD PRESSURE: 148 MMHG | HEART RATE: 56 BPM | DIASTOLIC BLOOD PRESSURE: 88 MMHG | HEIGHT: 72 IN | WEIGHT: 240 LBS

## 2020-09-15 DIAGNOSIS — I10 ESSENTIAL HYPERTENSION: Primary | ICD-10-CM

## 2020-09-15 DIAGNOSIS — E78.2 MIXED HYPERLIPIDEMIA: ICD-10-CM

## 2020-09-15 PROCEDURE — 99214 OFFICE O/P EST MOD 30 MIN: CPT | Performed by: INTERNAL MEDICINE

## 2020-09-15 PROCEDURE — 93000 ELECTROCARDIOGRAM COMPLETE: CPT | Performed by: INTERNAL MEDICINE

## 2020-09-15 NOTE — PROGRESS NOTES
Date of Office Visit: 09/15/2020  Encounter Provider: Aliza Saleh MD  Place of Service: [facility] CARDIOLOGY  Patient Name: Dustin Wilkerson  :1950      Patient ID:  Dustin Wilkerson is a 70 y.o. male is here for  followup for         History of Present Illness       He was referred for falls and hypertension. He saw Dr. Mahoney from 2017.  At that visit, he was seen for three unexplained falls where he will suddenly fall without warning.  He has had an injury associated with this.  He has a history of essential hypertension, gout, and hyperlipidemia.  We did check orthostatic blood pressures in the office and he is not orthostatic.       In 2016, he was walking out in his yard, went over to turn the hose off, and fell.  He found himself on the ground.  He is not sure if he passed out and he had a right arm injury, which was moderately severe.  He was able to continue.  He said he did not feel his heart racing or skipping before this.  He had no dizziness.  He had no chest pain or pressure and no difficulty breathing.  He just did not really know what happened, and he has been a lifelong exerciser and has never had any symptoms with exercise.  On 2016, he had taken his medications.  It was late in the evening (11:00 at night), and he went onto his front porch and, the next thing he knew, he found himself on the ground.  He is not sure that he had any syncope.  In fact, he thinks he did not; however, he sustained a severe right leg injury and had to have multiple surgeries to repair all of his quadriceps tendons on his right leg.  He does not know why this happened.  Again, before this, he had no chest pain or difficulty breathing.  In talking with him, he is on numerous medications and he takes all of them at night, and he had taken all of them right before this second incident in 2016.  Because his injury was so severe, he has not walked since  12/02/2016.  With his surgeries, he was in a wheelchair for a while and then he was on a walker and now he is with a cane and has a big brace on his leg.       He has one caffeinated beverage a day and one 16-ounce bottle of water a day.  He has two children.  He is  and is the president of [employer].  In his family, there is no premature cardiovascular disease.  His mother had strokes.  He does not use any alcohol and he does not smoke.        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  This was in 08/2015.  He has seen Dr. Sunny Mcelroy from ENT about his hearing loss.   He saw Dimas Richmond who felt that the lacunar infarct was due to hypertension.  He also felt that the falls were due to a lumbar problem.  He had carotid duplex done 6/2017 which showed mild bilateral carotid stenosis.     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 After starting him on metoprolol for SVT, the patient called in complaining of hypertension. He was started on hydrochlorothiazide 12.5 daily and his amlodipine was decreased. He was also having problems with leg swelling. His EF was 65%. He had mild dilation of the aortic root. He did have a CTA because of the mild dilation of the aortic root. 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 A 2.2 cm cystic lesion from the tail of the pancreas.      CT angiogram of the chest done 6/27/2019 show the aortic root at 4.4 cm, sinotubular junction 3.8 cm, a sending aorta 4.3 cm in the tubular portion.  This was unchanged from prior studies done in June 2018 in May 2017.    Labs in 1/9/2020 show creatinine 1.5, potassium 5.1, otherwise unremarkable CMP, hemoglobin A1c 5.9, TSH 5.81 with a normal  being 4.2, HDL 38, .    He has no chest pain or pressure.  He is had no tachycardia, dizziness or syncope.  He is taking his medications as directed and feels well.  He is not exercising and has gained a bit of weight.  He checks his blood pressure at home and it runs 130/70.  He has no orthopnea or PND.  He has no exertional dyspnea.      Past Medical History:   Diagnosis Date   • Essential hypertension    • Falls     3 unexplained   • Gout    • Hearing loss    • Hyperlipidemia    • Hypertension    • Snoring    • Syncope          Past Surgical History:   Procedure Laterality Date   • KNEE SURGERY         Current Outpatient Medications on File Prior to Visit   Medication Sig Dispense Refill   • allopurinol (ZYLOPRIM) 100 MG tablet Take 1 tablet by mouth Daily.     • amLODIPine (NORVASC) 2.5 MG tablet Take 1 tablet by mouth Daily. 90 tablet 3   • aspirin 81 MG EC tablet Take 81 mg by mouth Daily.     • benazepril (LOTENSIN) 40 MG tablet Take 1 tablet by mouth Daily.     • levothyroxine (SYNTHROID, LEVOTHROID) 100 MCG tablet TAKE ONE TABLET BY MOUTH DAILY     • metoprolol succinate XL (TOPROL-XL) 50 MG 24 hr tablet Take 1 tablet by mouth 2 (Two) Times a Day. 180 tablet 3   • Multiple Vitamin (MULTI VITAMIN PO) Take 1 tablet by mouth Daily.     • pantoprazole (PROTONIX) 40 MG EC tablet Take 40 mg by mouth Daily.     • pravastatin (PRAVACHOL) 40 MG tablet Take 1 tablet by mouth Daily.     • sildenafil (REVATIO) 20 MG tablet Take 20 mg by mouth As Needed.     • spironolactone (ALDACTONE) 25 MG tablet TAKE ONE TABLET BY MOUTH DAILY 90 tablet 3   • tamsulosin (FLOMAX) 0.4 MG capsule 24 hr capsule Take 0.4 mg by mouth Daily.     • hydrochlorothiazide (HYDRODIURIL) 25 MG tablet Take 1 tablet by mouth Daily. 90 tablet 3     No current facility-administered medications on file prior to visit.        Social History     Socioeconomic History   • Marital status:      Spouse name: Not on file   • Number of children:  "Not on file   • Years of education: Not on file   • Highest education level: Not on file   Tobacco Use   • Smoking status: Never Smoker   • Smokeless tobacco: Never Used   • Tobacco comment: caffeine use   Substance and Sexual Activity   • Alcohol use: No   • Drug use: No           Review of Systems   Constitution: Negative.   HENT: Negative for congestion.    Eyes: Negative for vision loss in left eye and vision loss in right eye.   Respiratory: Negative.  Negative for cough, hemoptysis, shortness of breath, sleep disturbances due to breathing, snoring, sputum production and wheezing.    Endocrine: Negative.    Hematologic/Lymphatic: Negative.    Skin: Negative for poor wound healing and rash.   Musculoskeletal: Negative for falls, gout, muscle cramps and myalgias.   Gastrointestinal: Negative for abdominal pain, diarrhea, dysphagia, hematemesis, melena, nausea and vomiting.   Neurological: Negative for excessive daytime sleepiness, dizziness, headaches, light-headedness, loss of balance, seizures and vertigo.   Psychiatric/Behavioral: Negative for depression and substance abuse. The patient is not nervous/anxious.        Procedures    ECG 12 Lead    Date/Time: 9/15/2020 9:58 AM  Performed by: Aliza Saleh MD  Authorized by: Aliza Saleh MD   Comparison: compared with previous ECG   Similar to previous ECG  Rhythm: sinus rhythm  Ectopy: atrial premature contractions    Clinical impression: non-specific ECG                Objective:      Vitals:    09/15/20 0938   BP: 148/88   BP Location: Right arm   Patient Position: Sitting   Cuff Size: Adult   Pulse: 56   Weight: 109 kg (240 lb)   Height: 182.9 cm (72\")     Body mass index is 32.55 kg/m².    Vitals signs reviewed.   Constitutional:       General: Not in acute distress.     Appearance: Well-developed. Not diaphoretic.   Eyes:      General: No scleral icterus.     Conjunctiva/sclera: Conjunctivae normal.   HENT:      Head: Normocephalic and " atraumatic.   Neck:      Musculoskeletal: Neck supple.      Thyroid: No thyromegaly.      Vascular: No carotid bruit or JVD.      Lymphadenopathy: No cervical adenopathy.   Pulmonary:      Effort: Pulmonary effort is normal. No respiratory distress.      Breath sounds: Normal breath sounds. No wheezing. No rhonchi. No rales.   Chest:      Chest wall: Not tender to palpatation.   Cardiovascular:      Normal rate. Regular rhythm.      No gallop.   Edema:     Peripheral edema absent.   Abdominal:      General: Bowel sounds are normal. There is no distension or abdominal bruit.      Palpations: Abdomen is soft. There is no abdominal mass.      Tenderness: There is no abdominal tenderness.   Musculoskeletal:         General: No deformity.      Extremities: No clubbing present.  Skin:     General: Skin is warm and dry. There is no cyanosis.      Coloration: Skin is not pale.      Findings: No rash.   Neurological:      Mental Status: Alert and oriented to person, place, and time.      Cranial Nerves: No cranial nerve deficit.   Psychiatric:         Judgment: Judgment normal.         Lab Review:       Assessment:      Diagnosis Plan   1. Essential hypertension     2. Mixed hyperlipidemia       1. Two significant falls resulting in injury, both of them right-sided (right arm injury and severe right leg injury).    2. Hypertension, which has been under good control.    3. Hyperlipidemia for which he takes pravastatin.    4. Gout.    5. SVT on toprol and controlling it well  6. Dilated aorta, recheck next year, continue b-blocker.   7. Right lumbar radicular pain, seeing Dr. Richmond.  Due to disc disease at L3-L4.      Plan:       No medication changes.  Continue to check blood pressure at home.  Advised exercise and weight loss.  See back in 1 year.  Repeat gated CTA of the chest 1 month prior to his next visit.

## 2020-10-27 RX ORDER — METOPROLOL SUCCINATE 50 MG/1
TABLET, EXTENDED RELEASE ORAL
Qty: 180 TABLET | Refills: 3 | Status: SHIPPED | OUTPATIENT
Start: 2020-10-27 | End: 2021-10-28

## 2020-10-27 NOTE — TELEPHONE ENCOUNTER
Next appointment 09/15/2021.      Assessment:        Diagnosis Plan   1. Essential hypertension      2. Mixed hyperlipidemia         1. Two significant falls resulting in injury, both of them right-sided (right arm injury and severe right leg injury).    2. Hypertension, which has been under good control.    3. Hyperlipidemia for which he takes pravastatin.    4. Gout.    5. SVT on toprol and controlling it well  6. Dilated aorta, recheck next year, continue b-blocker.   7. Right lumbar radicular pain, seeing Dr. Richmond.  Due to disc disease at L3-L4.      Plan:       No medication changes.  Continue to check blood pressure at home.  Advised exercise and weight loss.  See back in 1 year.  Repeat gated CTA of the chest 1 month prior to his next visit.

## 2020-12-11 RX ORDER — AMLODIPINE BESYLATE 2.5 MG/1
TABLET ORAL
Qty: 90 TABLET | Refills: 3 | Status: SHIPPED | OUTPATIENT
Start: 2020-12-11 | End: 2021-09-22 | Stop reason: SDUPTHER

## 2021-04-07 RX ORDER — SPIRONOLACTONE 25 MG/1
TABLET ORAL
Qty: 90 TABLET | Refills: 3 | Status: SHIPPED | OUTPATIENT
Start: 2021-04-07 | End: 2022-03-30 | Stop reason: SDUPTHER

## 2021-08-09 ENCOUNTER — TELEPHONE (OUTPATIENT)
Dept: CARDIOLOGY | Facility: CLINIC | Age: 71
End: 2021-08-09

## 2021-08-09 NOTE — TELEPHONE ENCOUNTER
----- Message from Dustin Wilkerson sent at 8/9/2021  2:17 PM EDT -----  Regarding: Visit Follow-Up Question  Contact: [phone]  Dr. Palomo,    I have a  one year follow up appointment on September 22 regarding my abdominal aneurysm. My last visit we skipped the scan and planned to do one this year. I had a follow up in my computer that said I should follow up with you to get an order for a scan and anything else I may need in preparation for my September 22 appointment. Is this set up by your office or do I need to make this appointment?  Thank you  Dustin Wilkerson  1950

## 2021-08-09 NOTE — TELEPHONE ENCOUNTER
Per office note on 9/15/2020      Plan:       No medication changes.  Continue to check blood pressure at home.  Advised exercise and weight loss.  See back in 1 year.  Repeat gated CTA of the chest 1 month prior to his next visit.      Can you please place the order so that we can get the scheduled for pt to have this done prior to appt on 9/22/2021

## 2021-08-10 DIAGNOSIS — I77.819 ACQUIRED DILATION OF ASCENDING AORTA AND AORTIC ROOT (HCC): Primary | ICD-10-CM

## 2021-09-03 ENCOUNTER — HOSPITAL ENCOUNTER (OUTPATIENT)
Dept: CT IMAGING | Facility: HOSPITAL | Age: 71
Discharge: HOME | End: 2021-09-03
Admitting: NURSE PRACTITIONER

## 2021-09-03 DIAGNOSIS — I77.819 ACQUIRED DILATION OF ASCENDING AORTA AND AORTIC ROOT (HCC): ICD-10-CM

## 2021-09-03 LAB — CREAT BLDA-MCNC: 1.3 MG/DL (ref 0.6–1.3)

## 2021-09-03 PROCEDURE — 82565 ASSAY OF CREATININE: CPT

## 2021-09-03 PROCEDURE — 0 IOPAMIDOL PER 1 ML: Performed by: NURSE PRACTITIONER

## 2021-09-03 PROCEDURE — 71275 CT ANGIOGRAPHY CHEST: CPT

## 2021-09-03 RX ADMIN — IOPAMIDOL 95 ML: 755 INJECTION, SOLUTION INTRAVENOUS at 15:56

## 2021-09-08 ENCOUNTER — TELEPHONE (OUTPATIENT)
Dept: CARDIOLOGY | Facility: CLINIC | Age: 71
End: 2021-09-08

## 2021-09-08 NOTE — TELEPHONE ENCOUNTER
Attempted to contact patient. Voicemail left requesting a call back for the results. Will continue to try and reach patient.      Rossy Weems RN  Triage [facility]

## 2021-09-22 ENCOUNTER — OFFICE VISIT (OUTPATIENT)
Dept: CARDIOLOGY | Facility: CLINIC | Age: 71
End: 2021-09-22

## 2021-09-22 VITALS
HEIGHT: 72 IN | HEART RATE: 62 BPM | SYSTOLIC BLOOD PRESSURE: 140 MMHG | WEIGHT: 228.8 LBS | BODY MASS INDEX: 30.99 KG/M2 | DIASTOLIC BLOOD PRESSURE: 70 MMHG

## 2021-09-22 DIAGNOSIS — E78.2 MIXED HYPERLIPIDEMIA: ICD-10-CM

## 2021-09-22 DIAGNOSIS — I10 ESSENTIAL HYPERTENSION: Primary | ICD-10-CM

## 2021-09-22 DIAGNOSIS — I25.10 CORONARY ARTERY CALCIFICATION: ICD-10-CM

## 2021-09-22 DIAGNOSIS — I25.84 CORONARY ARTERY CALCIFICATION: ICD-10-CM

## 2021-09-22 PROCEDURE — 93000 ELECTROCARDIOGRAM COMPLETE: CPT | Performed by: INTERNAL MEDICINE

## 2021-09-22 PROCEDURE — 99214 OFFICE O/P EST MOD 30 MIN: CPT | Performed by: INTERNAL MEDICINE

## 2021-09-22 RX ORDER — ROSUVASTATIN CALCIUM 20 MG/1
20 TABLET, COATED ORAL NIGHTLY
Qty: 90 TABLET | Refills: 3 | Status: SHIPPED | OUTPATIENT
Start: 2021-09-22 | End: 2022-09-26

## 2021-09-22 RX ORDER — AMLODIPINE BESYLATE 2.5 MG/1
2.5 TABLET ORAL 2 TIMES DAILY
Qty: 180 TABLET | Refills: 3 | Status: SHIPPED | OUTPATIENT
Start: 2021-09-22 | End: 2021-10-29

## 2021-09-22 NOTE — PROGRESS NOTES
Date of Office Visit: 2021  Encounter Provider: Aliza Saleh MD  Place of Service: [facility] CARDIOLOGY  Patient Name: Dustin Wilkerson  :1950      Patient ID:  Dustin Wilkerson is a 71 y.o. male is here for  followup for cardiac risks.        History of Present Illness    He was referred for falls and hypertension. He saw Dr. Mahoney from 2017.  At that visit, he was seen for three unexplained falls where he will suddenly fall without warning.  He has had an injury associated with this.  He has a history of essential hypertension, gout, and hyperlipidemia.  We did check orthostatic blood pressures in the office and he is not orthostatic.       In 2016, he was walking out in his yard, went over to turn the hose off, and fell.  He found himself on the ground.  He is not sure if he passed out and he had a right arm injury, which was moderately severe.  He was able to continue.  He said he did not feel his heart racing or skipping before this.  He had no dizziness.  He had no chest pain or pressure and no difficulty breathing.  He just did not really know what happened, and he has been a lifelong exerciser and has never had any symptoms with exercise.  On 2016, he had taken his medications.  It was late in the evening (11:00 at night), and he went onto his front porch and, the next thing he knew, he found himself on the ground.  He is not sure that he had any syncope.  In fact, he thinks he did not; however, he sustained a severe right leg injury and had to have multiple surgeries to repair all of his quadriceps tendons on his right leg.  He does not know why this happened.  Again, before this, he had no chest pain or difficulty breathing.  In talking with him, he is on numerous medications and he takes all of them at night, and he had taken all of them right before this second incident in 2016.  Because his injury was so severe, he has not walked  since 12/02/2016.  With his surgeries, he was in a wheelchair for a while and then he was on a walker and now he is with a cane and has a big brace on his leg.       He has one caffeinated beverage a day and one 16-ounce bottle of water a day.  He has two children.  He is  and is the president of [employer].  In his family, there is no premature cardiovascular disease.  His mother had strokes.  He does not use any alcohol and he does not smoke.        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  This was in 08/2015.  He has seen Dr. Sunny Mcelroy from ENT about his hearing loss.   He saw Dimas Richmond who felt that the lacunar infarct was due to hypertension.  He also felt that the falls were due to a lumbar problem.  He had carotid duplex done 6/2017 which showed mild bilateral carotid stenosis.     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 After starting him on metoprolol for SVT, the patient called in complaining of hypertension. He was started on hydrochlorothiazide 12.5 daily and his amlodipine was decreased. He was also having problems with leg swelling. His EF was 65%. He had mild dilation of the aortic root. He did have a CTA because of the mild dilation of the aortic root. 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 A 2.2 cm cystic lesion from the tail of the pancreas.      CT angiogram of the chest done 6/27/2019 show the aortic root at 4.4 cm, sinotubular junction 3.8 cm, a sending aorta 4.3 cm in the tubular portion.  This was unchanged from prior studies done in June 2018 in May 2017.     Labs on 3/5/2021 show glucose 118, otherwise unremarkable CMP, creatinine was 1.3, hemoglobin A1c 5.9, normal TSH,  normal CBC, Lipid panel showed total cholesterol 180, triglycerides 175, HDL 33, .  normal free kappa and lambda light chains on 7/1/2021.  CTA of the chest for follow-up for ascending aortic aneurysm done 9/3/2021 showed the aortic root to measure 4.4, the ascending aorta 4.3 which was stable from prior CT done 6/27/2019.  I did review the CT images and there is significant calcification of the proximal LAD and scattered calcification throughout the RCA.  He is not exercising.  He is under some stress as his business is suffering due to Covid.  He runs a travel clinic.  He has no heart racing or skipping.  He has no dizziness.  He has had no syncope.  He has no orthopnea or PND.  He has had no falls.    Past Medical History:   Diagnosis Date   • Essential hypertension    • Falls     3 unexplained   • Gout    • Hearing loss    • Hyperlipidemia    • Hypertension    • Snoring    • Syncope          Past Surgical History:   Procedure Laterality Date   • KNEE SURGERY         Current Outpatient Medications on File Prior to Visit   Medication Sig Dispense Refill   • allopurinol (ZYLOPRIM) 100 MG tablet Take 1 tablet by mouth Daily.     • aspirin 81 MG EC tablet Take 81 mg by mouth Daily.     • benazepril (LOTENSIN) 40 MG tablet Take 1 tablet by mouth Daily.     • levothyroxine (SYNTHROID, LEVOTHROID) 100 MCG tablet TAKE ONE TABLET BY MOUTH DAILY     • metoprolol succinate XL (TOPROL-XL) 50 MG 24 hr tablet TAKE ONE TABLET BY MOUTH TWICE A  tablet 3   • Multiple Vitamin (MULTI VITAMIN PO) Take 1 tablet by mouth Daily.     • pantoprazole (PROTONIX) 40 MG EC tablet Take 40 mg by mouth Daily.     • sildenafil (REVATIO) 20 MG tablet Take 20 mg by mouth As Needed.     • spironolactone (ALDACTONE) 25 MG tablet TAKE ONE TABLET BY MOUTH DAILY 90 tablet 3   • tamsulosin (FLOMAX) 0.4 MG capsule 24 hr capsule Take 0.4 mg by mouth Daily.     • [DISCONTINUED] amLODIPine (NORVASC) 2.5 MG tablet TAKE ONE TABLET BY MOUTH DAILY  "90 tablet 3   • [DISCONTINUED] pravastatin (PRAVACHOL) 40 MG tablet Take 1 tablet by mouth Daily.     • [DISCONTINUED] hydrochlorothiazide (HYDRODIURIL) 25 MG tablet Take 1 tablet by mouth Daily. 90 tablet 3     No current facility-administered medications on file prior to visit.       Social History     Socioeconomic History   • Marital status:      Spouse name: Not on file   • Number of children: Not on file   • Years of education: Not on file   • Highest education level: Not on file   Tobacco Use   • Smoking status: Never Smoker   • Smokeless tobacco: Never Used   Substance and Sexual Activity   • Alcohol use: No     Comment: caffeine use: None   • Drug use: No           ROS    Procedures    ECG 12 Lead    Date/Time: 9/22/2021 10:05 AM  Performed by: Aliza Saleh MD  Authorized by: Aliza Saleh MD   Comparison: compared with previous ECG   Similar to previous ECG  Rhythm: sinus rhythm  QRS axis: left    Clinical impression: non-specific ECG                Objective:      Vitals:    09/22/21 0932 09/22/21 0933   BP: 136/80 140/70   BP Location: Left arm Right arm   Pulse: 62    Weight: 104 kg (228 lb 12.8 oz)    Height: 182.9 cm (72\")      Body mass index is 31.03 kg/m².    Vitals reviewed.   Constitutional:       General: Not in acute distress.     Appearance: Well-developed. Not diaphoretic.   Eyes:      General: No scleral icterus.     Conjunctiva/sclera: Conjunctivae normal.   HENT:      Head: Normocephalic and atraumatic.   Neck:      Thyroid: No thyromegaly.      Vascular: No carotid bruit or JVD.      Lymphadenopathy: No cervical adenopathy.   Pulmonary:      Effort: Pulmonary effort is normal. No respiratory distress.      Breath sounds: Normal breath sounds. No wheezing. No rhonchi. No rales.   Chest:      Chest wall: Not tender to palpatation.   Cardiovascular:      Normal rate. Regular rhythm.      Murmurs: There is no murmur.      No gallop.   Pulses:     Intact distal pulses. "   Edema:     Peripheral edema absent.   Abdominal:      General: Bowel sounds are normal. There is no distension or abdominal bruit.      Palpations: Abdomen is soft. There is no abdominal mass.      Tenderness: There is no abdominal tenderness.   Musculoskeletal:         General: No deformity.      Extremities: No clubbing present.     Cervical back: Neck supple. Skin:     General: Skin is warm and dry. There is no cyanosis.      Coloration: Skin is not pale.      Findings: No rash.   Neurological:      Mental Status: Alert and oriented to person, place, and time.      Cranial Nerves: No cranial nerve deficit.   Psychiatric:         Judgment: Judgment normal.         Lab Review:       Assessment:      Diagnosis Plan   1. Essential hypertension  Treadmill Stress Test   2. Mixed hyperlipidemia     3. Coronary artery calcification  Treadmill Stress Test     1. Two significant falls resulting in injury, both of them right-sided (right arm injury and severe right leg injury).    2. Hypertension, goal less than 120/80, increase amlodipine to 2.5 mg twice daily.  May have to decrease spironolactone to 12.5 mg daily if his creatinine rises.  3. Hyperlipidemia.  4. Gout.    5. SVT on toprol and controlling it well  6. Dilated aorta, stable.  Last CTA done 9/2021.  7. Right lumbar radicular pain, seeing Dr. Richmond.  Due to disc disease at L3-L4.   8. Coronary artery calcification in the LAD and RCA, stop Pravachol and start rosuvastatin 20 mg nightly.  Set up treadmill stress today.     Plan:       See [facility] in 6 months, medication changes as noted above.

## 2021-10-01 ENCOUNTER — TELEPHONE (OUTPATIENT)
Dept: CARDIOLOGY | Facility: CLINIC | Age: 71
End: 2021-10-01

## 2021-10-01 ENCOUNTER — HOSPITAL ENCOUNTER (OUTPATIENT)
Dept: CARDIOLOGY | Facility: HOSPITAL | Age: 71
Discharge: HOME | End: 2021-10-01
Admitting: INTERNAL MEDICINE

## 2021-10-01 DIAGNOSIS — I10 ESSENTIAL HYPERTENSION: ICD-10-CM

## 2021-10-01 DIAGNOSIS — I25.10 CORONARY ARTERY CALCIFICATION: ICD-10-CM

## 2021-10-01 DIAGNOSIS — I25.84 CORONARY ARTERY CALCIFICATION: ICD-10-CM

## 2021-10-01 LAB
BH CV STRESS BP STAGE 1: NORMAL
BH CV STRESS BP STAGE 2: NORMAL
BH CV STRESS DURATION MIN STAGE 1: 3
BH CV STRESS DURATION MIN STAGE 2: 3
BH CV STRESS DURATION MIN STAGE 3: 1
BH CV STRESS DURATION SEC STAGE 1: 0
BH CV STRESS DURATION SEC STAGE 2: 0
BH CV STRESS DURATION SEC STAGE 3: 30
BH CV STRESS GRADE STAGE 1: 10
BH CV STRESS GRADE STAGE 2: 12
BH CV STRESS GRADE STAGE 3: 14
BH CV STRESS HR STAGE 1: 118
BH CV STRESS HR STAGE 2: 140
BH CV STRESS HR STAGE 3: 154
BH CV STRESS METS STAGE 1: 5
BH CV STRESS METS STAGE 2: 7.5
BH CV STRESS METS STAGE 3: 10
BH CV STRESS PROTOCOL 1: NORMAL
BH CV STRESS RECOVERY BP: NORMAL MMHG
BH CV STRESS RECOVERY HR: 110 BPM
BH CV STRESS SPEED STAGE 1: 1.7
BH CV STRESS SPEED STAGE 2: 2.5
BH CV STRESS SPEED STAGE 3: 3.4
BH CV STRESS STAGE 1: 1
BH CV STRESS STAGE 2: 2
BH CV STRESS STAGE 3: 3
MAXIMAL PREDICTED HEART RATE: 149 BPM
PERCENT MAX PREDICTED HR: 103.36 %
STRESS BASELINE BP: NORMAL MMHG
STRESS BASELINE HR: 108 BPM
STRESS PERCENT HR: 122 %
STRESS POST ESTIMATED WORKLOAD: 9 METS
STRESS POST EXERCISE DUR MIN: 7 MIN
STRESS POST EXERCISE DUR SEC: 30 SEC
STRESS POST PEAK BP: NORMAL MMHG
STRESS POST PEAK HR: 154 BPM
STRESS TARGET HR: 127 BPM

## 2021-10-01 PROCEDURE — 93017 CV STRESS TEST TRACING ONLY: CPT

## 2021-10-01 PROCEDURE — 93016 CV STRESS TEST SUPVJ ONLY: CPT | Performed by: INTERNAL MEDICINE

## 2021-10-01 PROCEDURE — 93018 CV STRESS TEST I&R ONLY: CPT | Performed by: INTERNAL MEDICINE

## 2021-10-01 NOTE — TELEPHONE ENCOUNTER
Notified patient of results. He verbalized understanding.    Christine Hayes, RN  [place] [facility] [place]

## 2021-10-28 RX ORDER — METOPROLOL SUCCINATE 50 MG/1
TABLET, EXTENDED RELEASE ORAL
Qty: 180 TABLET | Refills: 3 | Status: SHIPPED | OUTPATIENT
Start: 2021-10-28 | End: 2022-05-27

## 2021-10-29 RX ORDER — AMLODIPINE BESYLATE 2.5 MG/1
TABLET ORAL
Qty: 90 TABLET | Refills: 1 | Status: SHIPPED | OUTPATIENT
Start: 2021-10-29 | End: 2022-03-30 | Stop reason: SDUPTHER

## 2022-03-30 ENCOUNTER — OFFICE VISIT (OUTPATIENT)
Dept: CARDIOLOGY | Facility: CLINIC | Age: 72
End: 2022-03-30

## 2022-03-30 VITALS
HEART RATE: 58 BPM | DIASTOLIC BLOOD PRESSURE: 78 MMHG | SYSTOLIC BLOOD PRESSURE: 132 MMHG | HEIGHT: 72 IN | WEIGHT: 224 LBS | BODY MASS INDEX: 30.34 KG/M2

## 2022-03-30 DIAGNOSIS — I10 ESSENTIAL HYPERTENSION: ICD-10-CM

## 2022-03-30 DIAGNOSIS — E78.2 MIXED HYPERLIPIDEMIA: ICD-10-CM

## 2022-03-30 DIAGNOSIS — I25.84 CORONARY ARTERY CALCIFICATION: ICD-10-CM

## 2022-03-30 DIAGNOSIS — I77.810 AORTIC ROOT DILATION: ICD-10-CM

## 2022-03-30 DIAGNOSIS — R42 LIGHTHEADEDNESS: ICD-10-CM

## 2022-03-30 DIAGNOSIS — N18.31 STAGE 3A CHRONIC KIDNEY DISEASE: ICD-10-CM

## 2022-03-30 DIAGNOSIS — I65.23 BILATERAL CAROTID ARTERY STENOSIS: ICD-10-CM

## 2022-03-30 DIAGNOSIS — I77.810 ASCENDING AORTA DILATATION: Primary | ICD-10-CM

## 2022-03-30 DIAGNOSIS — I25.10 CORONARY ARTERY CALCIFICATION: ICD-10-CM

## 2022-03-30 PROBLEM — E78.5 HYPERLIPIDEMIA: Status: RESOLVED | Noted: 2017-04-03 | Resolved: 2022-03-30

## 2022-03-30 PROBLEM — R93.0 ABNORMAL MRI OF HEAD: Status: RESOLVED | Noted: 2017-06-16 | Resolved: 2022-03-30

## 2022-03-30 PROBLEM — I71.9 AORTIC ANEURYSM (HCC): Status: RESOLVED | Noted: 2020-01-09 | Resolved: 2022-03-30

## 2022-03-30 PROBLEM — G62.9 NEUROPATHY: Status: ACTIVE | Noted: 2021-07-23

## 2022-03-30 PROBLEM — I71.9 AORTIC ANEURYSM (HCC): Status: ACTIVE | Noted: 2020-01-09
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
## 2022-03-30 PROCEDURE — 99214 OFFICE O/P EST MOD 30 MIN: CPT | Performed by: NURSE PRACTITIONER

## 2022-03-30 PROCEDURE — 93000 ELECTROCARDIOGRAM COMPLETE: CPT | Performed by: NURSE PRACTITIONER

## 2022-03-30 RX ORDER — AMLODIPINE BESYLATE 10 MG/1
10 TABLET ORAL DAILY
Qty: 90 TABLET | Refills: 3 | Status: SHIPPED | OUTPATIENT
Start: 2022-03-30

## 2022-03-30 RX ORDER — PREDNISONE 10 MG/1
TABLET ORAL
COMMUNITY
Start: 2022-03-18 | End: 2022-05-25 | Stop reason: ALTCHOICE

## 2022-03-30 RX ORDER — SPIRONOLACTONE 25 MG/1
25 TABLET ORAL DAILY
Qty: 90 TABLET | Refills: 3 | Status: SHIPPED | OUTPATIENT
Start: 2022-03-30

## 2022-03-30 NOTE — PROGRESS NOTES
Date of Office Visit: 2022  Encounter Provider: MAYNOR Ruiz  Place of Service: [facility] CARDIOLOGY  Patient Name: Dustin Wilkerson  :1950  Primary Cardiologist: Dr. Aliza Saleh    Chief Complaint   Patient presents with   • Hypertension   • Follow-up   :     HPI: Dustin Wilkerson is a pleasant 71 y.o. male who presents today for follow-up on aortic enlargement and hypertension. He is a new patient to me and his previous records have been reviewed.    In , he was having falls without warning.  He was referred to Dr. Saleh.  At that time, he had known hypertension, hyperlipidemia, and gout.  An MRI of his head in  showed an old chronic infarct in the left corona radiata was felt to be due to hypertension.  Dr. Saleh had him wear a Zio patch monitor which showed normal sinus rhythm with burst episodes of SVT.  Echocardiogram showed normal LVEF and mild dilation of the aortic root.  CTA of the chest confirmed aortic root dilation and ascending aorta dilation.  This is then measured annually.    In 2021, aortic root measured 4.4 cm, ascending aorta measured 4.3 cm, mild pericardial effusion, right upper lobe micronodule, and pancreatic tail cyst.  Treadmill stress test was normal.    I reviewed his blood work from 2022: BMP normal except for glucose of 120 and creatinine of 1.24.  Hemoglobin A1c 5.9.  In 2021, blood work showed total cholesterol 180, triglycerides 175, HDL low at 33, and .  TSH was normal.  Hemoglobin and hematocrit were normal.    He presents today for follow-up visit.  His blood pressure is averaging 135/60-70s at home.  He occasionally experiences some lightheadedness.  He denies chest pain, dyspnea, palpitations, edema, syncope, or bleeding.      Past Medical History:   Diagnosis Date   • Aortic root dilation (HCC) 3/30/2022   • Ascending aorta dilatation (HCC) 3/30/2022   • Bilateral  carotid artery stenosis    • Coronary artery calcification 3/30/2022   • Essential hypertension    • Falls     3 unexplained   • Gout    • Hearing loss    • Hyperlipidemia    • Hypertension    • Snoring    • Syncope        Past Surgical History:   Procedure Laterality Date   • KNEE SURGERY         Social History     Socioeconomic History   • Marital status:    Tobacco Use   • Smoking status: Never Smoker   • Smokeless tobacco: Never Used   Substance and Sexual Activity   • Alcohol use: No     Comment: caffeine use: None   • Drug use: No       Family History   Problem Relation Age of Onset   • Stroke Mother    • Dementia Mother        The following portion of the patient's history were reviewed and updated as appropriate: past medical history, past surgical history, past social history, past family history, allergies, current medications, and problem list.    Review of Systems   Constitutional: Negative.   Cardiovascular: Negative.    Respiratory: Positive for snoring.    Hematologic/Lymphatic: Negative.    Musculoskeletal: Positive for joint pain.   Neurological: Positive for light-headedness.       No Known Allergies aorta dilation and aortic root dilation      Current Outpatient Medications:   •  allopurinol (ZYLOPRIM) 100 MG tablet, Take 1 tablet by mouth Daily., Disp: , Rfl:   •  amLODIPine (NORVASC) 2.5 MG tablet, TAKE ONE TABLET BY MOUTH DAILY (Patient taking differently: 2 (Two) Times a Day.), Disp: 90 tablet, Rfl: 1  •  aspirin 81 MG EC tablet, Take 81 mg by mouth Daily., Disp: , Rfl:   •  benazepril (LOTENSIN) 40 MG tablet, Take 1 tablet by mouth Daily., Disp: , Rfl:   •  levothyroxine (SYNTHROID, LEVOTHROID) 100 MCG tablet, TAKE ONE TABLET BY MOUTH DAILY, Disp: , Rfl:   •  metoprolol succinate XL (TOPROL-XL) 50 MG 24 hr tablet, TAKE ONE TABLET BY MOUTH TWICE A DAY, Disp: 180 tablet, Rfl: 3  •  Multiple Vitamin (MULTI VITAMIN PO), Take 1 tablet by mouth Daily., Disp: , Rfl:   •  predniSONE  "(DELTASONE) 10 MG tablet, Take 2 bid for 4 days then 2 daily for 4 days then 1 daily for 4 days., Disp: , Rfl:   •  rosuvastatin (CRESTOR) 20 MG tablet, Take 1 tablet by mouth Every Night., Disp: 90 tablet, Rfl: 3  •  sildenafil (REVATIO) 20 MG tablet, Take 20 mg by mouth As Needed., Disp: , Rfl:   •  spironolactone (ALDACTONE) 25 MG tablet, TAKE ONE TABLET BY MOUTH DAILY, Disp: 90 tablet, Rfl: 3  •  tamsulosin (FLOMAX) 0.4 MG capsule 24 hr capsule, Take 0.4 mg by mouth Daily., Disp: , Rfl:         Objective:     Vitals:    03/30/22 1100 03/30/22 1112 03/30/22 1128   BP: 142/78 136/72 132/78   BP Location: Left arm Right arm Left arm   Patient Position:   Sitting   Pulse: 58     Weight: 102 kg (224 lb)     Height: 182.9 cm (72\")       Body mass index is 30.38 kg/m².    PHYSICAL EXAM:    Vitals Reviewed.   General Appearance: No acute distress, well developed and well nourished. Obese.   Eyes: Conjunctiva and lids: No erythema, swelling, or discharge. Sclera non-icteric. Glasses.   HENT: Atraumatic, normocephalic. External eyes, ears, and nose normal. No hearing loss noted. Mucous membranes normal. Lips not cyanotic. Neck supple with no tenderness. Wearing mask.   Respiratory: No signs of respiratory distress. Respiration rhythm and depth normal.   Clear to auscultation. No rales, crackles, rhonchi, or wheezing auscultated.   Cardiovascular:  Jugular Venous Pressure: Normal  Heart Rate and Rhythm: Normal, Heart Sounds: Normal S1 and S2. No S3 or S4 noted.  Murmurs: No murmurs noted. No rubs, thrills, or gallops.   Arterial Pulses: Carotid pulses normal. No carotid bruit noted. Posterior tibialis and dorsalis pedis pulses normal.   Lower Extremities: No edema noted.  Gastrointestinal:  Abdomen soft, non-distended, non-tender.    Musculoskeletal: Normal movement of extremities.  Skin and Nails: General appearance normal. No pallor, cyanosis, diaphoresis. Skin temperature normal. No clubbing of fingernails. "   Psychiatric: Patient alert and oriented to person, place, and time. Speech and behavior appropriate. Normal mood and affect.       ECG 12 Lead    Date/Time: 3/30/2022 11:10 AM  Performed by: Tiffany Pro APRN  Authorized by: Tiffany Pro APRN   Comparison: compared with previous ECG from 9/22/2021  Similar to previous ECG  Rhythm: sinus bradycardia  Rate: normal  BPM: 58  Conduction: conduction normal  ST Segments: ST segments normal  T Waves: T waves normal  QRS axis: normal  Other: no other findings    Clinical impression: normal ECG              Assessment:       Diagnosis Plan   1. Ascending aorta dilatation (HCC)     2. Aortic root dilation (HCC)     3. Essential hypertension     4. Lightheadedness     5. Mixed hyperlipidemia     6. Bilateral carotid artery stenosis     7. Coronary artery calcification            Plan:       1/2.  Ascending Aortic Dilation and Aortic Root Dilation: Measurements of been stable on gated CTA of the chest.  Repeat gated CTA in September 2022.    3.  Hypertension: Blood pressure goal 120s/80s or less recommended.  Increase amlodipine to 10 mg daily.    4.  Lightheadedness: Happens on a rare occasion.  Encourage slow position changes and he would benefit from drinking water.    5.  Hyperlipidemia: Followed by his PCP.    6.  Carotid Artery Stenosis: Mild per duplex in 2017.  Repeat in September 2022.    7.  Coronary Artery Calcification: Of the LAD and RCA per Dr. Saleh.  Continue statin.  Denies angina.  Treadmill stress test normal in 2021.    8.  CKD: Follows with his PCP.    9.  I asked him to call me with an update on his blood pressure in 4 to 6 weeks.  We will follow-up with him about his test results in September and he will see Dr. Saleh.      As always, it has been a pleasure to participate in your patient's care. Thank you.       Sincerely,         MAYNOR Castillo  [facility] Cardiology      · Dictated utilizing Dragon  Dictation  · COVID-19 Precautions - Patient was compliant in wearing a mask. When I saw the patient, I used appropriate personal protective equipment (PPE) including mask and eye shield (standard procedure).  Additionally, I used gown and gloves if indicated.  Hand hygiene was completed before and after seeing the patient.

## 2022-03-30 NOTE — PATIENT INSTRUCTIONS
Increase amlodipine to 10 mg daily   Call Tiffany Pro in 4-6 weeks with BP readings    Carotid test and CT chest in September 2022 - they will call to schedule    FU with Dr. Saleh in October 2022

## 2022-05-25 ENCOUNTER — HOSPITAL ENCOUNTER (OUTPATIENT)
Dept: CARDIOLOGY | Facility: HOSPITAL | Age: 72
Discharge: HOME | End: 2022-05-25

## 2022-05-25 ENCOUNTER — HOSPITAL ENCOUNTER (OUTPATIENT)
Dept: CT IMAGING | Facility: HOSPITAL | Age: 72
Discharge: HOME | End: 2022-05-25

## 2022-05-25 ENCOUNTER — TELEPHONE (OUTPATIENT)
Dept: CARDIOLOGY | Facility: CLINIC | Age: 72
End: 2022-05-25

## 2022-05-25 DIAGNOSIS — R93.89 ABNORMAL CT OF THE CHEST: ICD-10-CM

## 2022-05-25 DIAGNOSIS — I65.23 BILATERAL CAROTID ARTERY STENOSIS: ICD-10-CM

## 2022-05-25 DIAGNOSIS — I77.810 AORTIC ROOT DILATION: ICD-10-CM

## 2022-05-25 DIAGNOSIS — I77.810 ASCENDING AORTA DILATATION: ICD-10-CM

## 2022-05-25 DIAGNOSIS — R91.1 PULMONARY NODULE: Primary | ICD-10-CM

## 2022-05-25 LAB
BH CV XLRA MEAS LEFT DIST CCA EDV: 11.8 CM/SEC
BH CV XLRA MEAS LEFT DIST CCA PSV: 60.1 CM/SEC
BH CV XLRA MEAS LEFT DIST ICA EDV: -12.2 CM/SEC
BH CV XLRA MEAS LEFT DIST ICA PSV: -61.3 CM/SEC
BH CV XLRA MEAS LEFT MID ICA EDV: -22.8 CM/SEC
BH CV XLRA MEAS LEFT MID ICA PSV: -110 CM/SEC
BH CV XLRA MEAS LEFT PROX CCA EDV: 18.9 CM/SEC
BH CV XLRA MEAS LEFT PROX CCA PSV: 117 CM/SEC
BH CV XLRA MEAS LEFT PROX ECA EDV: -13.5 CM/SEC
BH CV XLRA MEAS LEFT PROX ECA PSV: -82.7 CM/SEC
BH CV XLRA MEAS LEFT PROX ICA EDV: 15.2 CM/SEC
BH CV XLRA MEAS LEFT PROX ICA PSV: 85 CM/SEC
BH CV XLRA MEAS LEFT PROX SCLA PSV: 116 CM/SEC
BH CV XLRA MEAS LEFT VERTEBRAL A EDV: -9.3 CM/SEC
BH CV XLRA MEAS LEFT VERTEBRAL A PSV: -37.4 CM/SEC
BH CV XLRA MEAS RIGHT DIST CCA EDV: 17 CM/SEC
BH CV XLRA MEAS RIGHT DIST CCA PSV: 80.9 CM/SEC
BH CV XLRA MEAS RIGHT DIST ICA EDV: -11 CM/SEC
BH CV XLRA MEAS RIGHT DIST ICA PSV: -42.4 CM/SEC
BH CV XLRA MEAS RIGHT MID ICA EDV: -18.1 CM/SEC
BH CV XLRA MEAS RIGHT MID ICA PSV: -61.3 CM/SEC
BH CV XLRA MEAS RIGHT PROX CCA EDV: 12.9 CM/SEC
BH CV XLRA MEAS RIGHT PROX CCA PSV: 68 CM/SEC
BH CV XLRA MEAS RIGHT PROX ECA EDV: -8.6 CM/SEC
BH CV XLRA MEAS RIGHT PROX ECA PSV: -70.3 CM/SEC
BH CV XLRA MEAS RIGHT PROX ICA EDV: -17.7 CM/SEC
BH CV XLRA MEAS RIGHT PROX ICA PSV: -62.9 CM/SEC
BH CV XLRA MEAS RIGHT PROX SCLA PSV: 112 CM/SEC
BH CV XLRA MEAS RIGHT VERTEBRAL A EDV: 5.3 CM/SEC
BH CV XLRA MEAS RIGHT VERTEBRAL A PSV: 19.1 CM/SEC
CREAT BLDA-MCNC: 1.4 MG/DL (ref 0.6–1.3)
LEFT ARM BP: NORMAL MMHG
MAXIMAL PREDICTED HEART RATE: 149 BPM
RIGHT ARM BP: NORMAL MMHG
STRESS TARGET HR: 127 BPM

## 2022-05-25 PROCEDURE — 71275 CT ANGIOGRAPHY CHEST: CPT

## 2022-05-25 PROCEDURE — 93880 EXTRACRANIAL BILAT STUDY: CPT

## 2022-05-25 PROCEDURE — 0 IOPAMIDOL PER 1 ML: Performed by: NURSE PRACTITIONER

## 2022-05-25 PROCEDURE — 82565 ASSAY OF CREATININE: CPT

## 2022-05-25 RX ADMIN — IOPAMIDOL 95 ML: 755 INJECTION, SOLUTION INTRAVENOUS at 12:02

## 2022-05-25 NOTE — TELEPHONE ENCOUNTER
"  Mr. Wilkerson was recently seen in the office in March.  Blood pressure was 142/78 with a recheck of 132/78.  I increased his amlodipine to 10 mg daily. His blood pressure is \"great\" - 114/72 - 120/70s.     Carotid duplex today showed right carotid plaque and left mild stenosis. Repeat in 3 years.  Continue aspirin and rosuvastatin.    Creatinine runs between 1.3-1.5. Creatinine today 1.4. I recommended that he fu with Dr. Mahoney about CKD. CTA pending.     Patient informed & verbalizes understanding.  "

## 2022-05-27 DIAGNOSIS — R91.1 PULMONARY NODULE: ICD-10-CM

## 2022-05-27 DIAGNOSIS — I77.810 ASCENDING AORTA DILATATION: Primary | ICD-10-CM

## 2022-05-27 DIAGNOSIS — I77.810 AORTIC ROOT DILATION: ICD-10-CM

## 2022-05-27 RX ORDER — METOPROLOL SUCCINATE 50 MG/1
75 TABLET, EXTENDED RELEASE ORAL 2 TIMES DAILY
Qty: 270 TABLET | Refills: 3 | Status: SHIPPED | OUTPATIENT
Start: 2022-05-27

## 2022-05-27 NOTE — TELEPHONE ENCOUNTER
I discussed the plan of care with Dr. Saleh.  She recommended increasing metoprolol and repeating the image in 6 months.  Increase metoprolol to 75 mg twice daily.      We discussed the aortic root and ascending aorta enlargement.  He has noted to have mediastinal nodes and pulmonary nodule and I will refer him to the multidisciplinary clinic.  He has gallstones and denies any pain.  He has known about the pancreatic cyst and had that evaluated in the past. I have sent this report to his PCP.      Patient informed & verbalizes understanding.

## 2022-06-09 ENCOUNTER — TELEPHONE (OUTPATIENT)
Dept: SURGERY | Facility: CLINIC | Age: 72
End: 2022-06-09

## 2022-06-09 NOTE — TELEPHONE ENCOUNTER
Called pt regarding new pt appointment on 6/30 no answer. Left message with appointment details and call back number as well.

## 2022-06-14 ENCOUNTER — TELEPHONE (OUTPATIENT)
Dept: CARDIOLOGY | Facility: CLINIC | Age: 72
End: 2022-06-14

## 2022-06-14 NOTE — TELEPHONE ENCOUNTER
In the last couple of months, his amlodipine has been increased to 10 mg daily and in May we increased his metoprolol to 75 mg twice per day.    I was calling to reassess how he is doing on the meds and reassess BP/HR.     I left a message for patient to return my call.

## 2022-06-16 NOTE — TELEPHONE ENCOUNTER
Patient said his BP has been in the 110s/70s. He is unsure of his HR because he has not been tracking it. I told him that if it is less than 60 to call us back and let us know, but he says he feels fine with the medication changes.     They also had his primary number to contact him his office number in the chart, he has requested we call his cellphone number from now on. This was changed in the chart.    Marguerite Warner RN  Triage [facility]

## 2022-06-16 NOTE — TELEPHONE ENCOUNTER
Great! Continue same medications.  Call with any cardiac concerns.  Follow-up as scheduled with Dr. Saleh.

## 2022-06-16 NOTE — TELEPHONE ENCOUNTER
Called and spoke with his office here in [place]. Pt is in his [place] office today.  is going to relay the message to him and have him call us back.    Thank you,    Christine Hayes RN  [place] [facility] [place]

## 2022-06-21 ENCOUNTER — TELEPHONE (OUTPATIENT)
Dept: SURGERY | Facility: CLINIC | Age: 72
End: 2022-06-21

## 2022-06-21 NOTE — TELEPHONE ENCOUNTER
I left a message for Mr. Wilkerson and asked him to call the office back.  We want to see if wants to come in tomorrow, Wed 6/22 for his appt and move it up from next Wed, 6/29.  It will be a 10:00am appt if he would like to do this.  gb

## 2022-06-28 ENCOUNTER — TELEPHONE (OUTPATIENT)
Dept: SURGERY | Facility: CLINIC | Age: 72
End: 2022-06-28

## 2022-06-28 NOTE — TELEPHONE ENCOUNTER
Called patient to confirm appointment on 6/29 at 10:00am. Patient did not answer, so I left voicemail with appointment date and time as well as a call back number to reschedule if needed

## 2022-06-29 ENCOUNTER — OFFICE VISIT (OUTPATIENT)
Dept: SURGERY | Facility: CLINIC | Age: 72
End: 2022-06-29

## 2022-06-29 VITALS
WEIGHT: 225 LBS | HEART RATE: 66 BPM | BODY MASS INDEX: 30.48 KG/M2 | OXYGEN SATURATION: 98 % | DIASTOLIC BLOOD PRESSURE: 82 MMHG | HEIGHT: 72 IN | SYSTOLIC BLOOD PRESSURE: 120 MMHG

## 2022-06-29 DIAGNOSIS — R91.1 LUNG NODULE: Primary | ICD-10-CM

## 2022-06-29 PROCEDURE — 99202 OFFICE O/P NEW SF 15 MIN: CPT | Performed by: THORACIC SURGERY (CARDIOTHORACIC VASCULAR SURGERY)

## 2022-06-29 NOTE — PROGRESS NOTES
"Chief Complaint  Lung nodule    Subjective        Dustin Wilkerson presents to [facility] THORACIC SURGERY  History of Present Illness     Dustin Wilkerson was seen in our office today for evaluation of a right upper lobe lung nodule.  Patient has dilation of his aortic root and is followed by Dr. Saleh.  Recent CT angio of the chest for follow-up of the aortic dilation shows a 3 mm nodule in the right upper lobe of the lung.  There are a few shotty lymph nodes in the mediastinum.  He has been referred here for evaluation of this lung nodule.    Patient is a native of [place].  He is a never smoker.  He has had some secondhand smoke exposures through his life.  He has also been exposed to industrial chemicals during through his work.  He has had no significant pulmonary problems.  There is been no history of asthma, bronchitis, or pneumonia.  He is active without significant shortness of breath or wheezing.  He has no cough or hemoptysis.  He has no pleuritic pain.  He has no hoarseness or change in his voice.  There has been no unexplained weight loss.    Objective   Vital Signs:  /82 (BP Location: Right arm, Patient Position: Sitting, Cuff Size: Adult)   Pulse 66   Ht 182.9 cm (72\")   Wt 102 kg (225 lb)   SpO2 98%   BMI 30.52 kg/m²   Estimated body mass index is 30.52 kg/m² as calculated from the following:    Height as of this encounter: 182.9 cm (72\").    Weight as of this encounter: 102 kg (225 lb).      Physical Exam     Neck: There is no cervical or supraclavicular adenopathy.  No masses.    Pulmonary: Lungs are clear to auscultation with equal breath sounds bilaterally.    Cardiac: Regular rate and rhythm.  No murmurs or gallops.  No dependent edema.    Result Review :  The following data was reviewed by: Josr Patel III, MD on 06/29/2022:    CT of the chest performed May 25, 2022 was independently reviewed.  There is a tiny 3 mm nodule in the posterior apical " aspect of the right upper lobe of the lung abutting the chest wall.  Apparently this has been seen on previous scans and is unchanged.  There are a few scattered mediastinal lymph nodes which also appear stable.  No new infiltrates nodules or masses.  No pathologically enlarged hilar or mediastinal lymph nodes.         Assessment and Plan   Diagnoses and all orders for this visit:    1. Lung nodule (Primary)      Patient was quite surprised that he was here in the office today for this nodule.  He was under the impression that he was coming here to discuss his aortic dilation.  He was quite distressed over the possibility of cancer.  I reassured him that this is unlikely a malignancy and more likely granuloma.  He is at low risk for lung cancer.  I have recommended that we follow this with serial CT scans.  I have answered all of his questions to his satisfaction.  He understands this plan and agrees.    Patient will return to see me in the office in October when he has a repeat CT angiogram for Dr. Loco.  I will keep you informed of his progress.       I spent 21 minutes caring for Dustin on this date of service. This time includes time spent by me in the following activities:preparing for the visit, reviewing tests, performing a medically appropriate examination and/or evaluation , counseling and educating the patient/family/caregiver, ordering medications, tests, or procedures, referring and communicating with other health care professionals , documenting information in the medical record, independently interpreting results and communicating that information with the patient/family/caregiver and care coordination  Follow Up   Return in about 4 months (around 10/25/2022) for Return to office after CT scan of the chest is completed and Dr. Saleh's office.  Patient was given instructions and counseling regarding his condition or for health maintenance advice. Please see specific information pulled into the  AVS if appropriate.

## 2022-09-20 ENCOUNTER — TELEPHONE (OUTPATIENT)
Dept: CARDIOLOGY | Facility: CLINIC | Age: 72
End: 2022-09-20

## 2022-09-20 NOTE — TELEPHONE ENCOUNTER
Would check once daily for 5 days to see if remains high and if is, then would adjust meds.     pls call and let him know.     rm    ----- Message from Linda Oh CMA sent at 9/20/2022  2:53 PM EDT -----  Regarding: FW: BP  TK out    Please Advise    ----- Message -----  From: Dustin Wilkerson  Sent: 9/20/2022   1:20 PM EDT  To: Fernanda Harrington [facility]  Subject: BP                                               Tiffany,  As you know, the recent changing of my BP meds has resulted in lowering of my BP. I have been taking it twice weekly in our office and it has been 124 or lower, and always around 75 or below. Today they took my BP at the [facility]ists office and it was 153/ 88   Later back in my office it was 153/83. I have been so happy to have had my BP averaging so low, and now I get these high numbers. Do I need to be concerned? I'll have one of our nurses take it again on Wednesday. I don't think I can afford to continue with these high numbers   ThanksRanda

## 2022-09-20 NOTE — TELEPHONE ENCOUNTER
Spoke to patient and gave RM recommendations.  He is checking his BP 2-3 hours after meds.  He will check for 5 more days and send the numbers via [employer].  He has not had and increased stress or changes in diet like increased sodium intake.  He just started noticing the increase in BP for 2 days. He will continue to monitor and sent the BP log in 5 days.  He agrees with the plan and verbalizes understanding.    Shereen Dodd RN  [facility] Triage  09/20/22 15:11 EDT

## 2022-09-26 RX ORDER — ROSUVASTATIN CALCIUM 20 MG/1
TABLET, COATED ORAL
Qty: 90 TABLET | Refills: 0 | Status: SHIPPED | OUTPATIENT
Start: 2022-09-26 | End: 2022-12-30 | Stop reason: SDUPTHER

## 2022-10-19 ENCOUNTER — HOSPITAL ENCOUNTER (OUTPATIENT)
Dept: CT IMAGING | Facility: HOSPITAL | Age: 72
Discharge: HOME | End: 2022-10-19
Admitting: NURSE PRACTITIONER

## 2022-10-19 DIAGNOSIS — I77.810 AORTIC ROOT DILATION: ICD-10-CM

## 2022-10-19 DIAGNOSIS — I77.810 ASCENDING AORTA DILATATION: ICD-10-CM

## 2022-10-19 DIAGNOSIS — R91.1 PULMONARY NODULE: ICD-10-CM

## 2022-10-19 PROCEDURE — 71275 CT ANGIOGRAPHY CHEST: CPT

## 2022-10-19 PROCEDURE — 0 IOPAMIDOL PER 1 ML: Performed by: NURSE PRACTITIONER

## 2022-10-19 PROCEDURE — 82565 ASSAY OF CREATININE: CPT

## 2022-10-19 RX ADMIN — IOPAMIDOL 95 ML: 755 INJECTION, SOLUTION INTRAVENOUS at 09:45

## 2022-10-20 ENCOUNTER — TELEPHONE (OUTPATIENT)
Dept: CARDIOLOGY | Facility: CLINIC | Age: 72
End: 2022-10-20

## 2022-10-20 LAB — CREAT BLDA-MCNC: 1.3 MG/DL (ref 0.6–1.3)

## 2022-10-20 NOTE — PROGRESS NOTES
5/2022 - CTA showed: aortic root 4.7 cm.  and ascending aorta 4.5 cm.   Now aortic root 4.7 and ascending aorta 4.6 cm.   Do you want to continue to monitor every 6 months or 12 months going forward?

## 2022-10-20 NOTE — PROGRESS NOTES
Dr. Saleh reviewed.  She said we do not need to repeat the echocardiogram.  Repeat gated CTA in 6 months in order has been placed.  I tried calling patient, but there was no answer.  Dr. Saleh will see him tomorrow in the office and will explain.  He follows with Dr. Patel for the lung abnormalities and I will send him a copy.

## 2022-10-20 NOTE — TELEPHONE ENCOUNTER
CTA showed:     5/2022 - CTA showed: aortic root 4.7 cm.  and ascending aorta 4.5 cm.   Now aortic root 4.7 and ascending aorta 4.6 cm.  For the lung abnormalities, he follows with Dr. Patel.    In 2017, aortic valve was normal.  I discussed with Dr. Saleh.  She said we do not need to repeat the echocardiogram.  Repeat gated CTA in 6 months.    I tried calling him and there was no answer.  I placed the order for the gated CTA for 6 months from now.  You will be seeing him tomorrow.  Can you please review with him.  Thanks so much.

## 2022-10-21 ENCOUNTER — OFFICE VISIT (OUTPATIENT)
Dept: CARDIOLOGY | Facility: CLINIC | Age: 72
End: 2022-10-21

## 2022-10-21 VITALS
WEIGHT: 299 LBS | OXYGEN SATURATION: 97 % | DIASTOLIC BLOOD PRESSURE: 78 MMHG | SYSTOLIC BLOOD PRESSURE: 130 MMHG | HEART RATE: 58 BPM | HEIGHT: 72 IN | BODY MASS INDEX: 40.5 KG/M2

## 2022-10-21 DIAGNOSIS — I77.810 ASCENDING AORTA DILATATION: ICD-10-CM

## 2022-10-21 DIAGNOSIS — N18.31 STAGE 3A CHRONIC KIDNEY DISEASE: ICD-10-CM

## 2022-10-21 DIAGNOSIS — I65.23 BILATERAL CAROTID ARTERY STENOSIS: ICD-10-CM

## 2022-10-21 DIAGNOSIS — E78.2 MIXED HYPERLIPIDEMIA: ICD-10-CM

## 2022-10-21 DIAGNOSIS — I25.10 CORONARY ARTERY CALCIFICATION: ICD-10-CM

## 2022-10-21 DIAGNOSIS — I25.84 CORONARY ARTERY CALCIFICATION: ICD-10-CM

## 2022-10-21 DIAGNOSIS — I77.810 AORTIC ROOT DILATION: Primary | ICD-10-CM

## 2022-10-21 DIAGNOSIS — R79.89 ELEVATED TSH: ICD-10-CM

## 2022-10-21 DIAGNOSIS — I10 ESSENTIAL HYPERTENSION: ICD-10-CM

## 2022-10-21 PROCEDURE — 99214 OFFICE O/P EST MOD 30 MIN: CPT | Performed by: INTERNAL MEDICINE

## 2022-10-21 PROCEDURE — 93000 ELECTROCARDIOGRAM COMPLETE: CPT | Performed by: INTERNAL MEDICINE

## 2022-10-21 NOTE — PROGRESS NOTES
Date of Office Visit: 10/21/2022  Encounter Provider: Aliza Saleh MD  Place of Service: [facility] CARDIOLOGY  Patient Name: Dustin Wilkerson  :1950      Patient ID:  Dustin Wilkerson is a 72 y.o. male is here for  followup for hypertension and coronary calcification, dilated ascending aorta.        History of Present Illness       He was referred for falls and hypertension. He saw Dr. Mahoney from 2017.  At that visit, he was seen for three unexplained falls where he will suddenly fall without warning.  He has had an injury associated with this.  He has a history of essential hypertension, gout, and hyperlipidemia.  We did check orthostatic blood pressures in the office and he is not orthostatic.       In 2016, he was walking out in his yard, went over to turn the hose off, and fell.  He found himself on the ground.  He is not sure if he passed out and he had a right arm injury, which was moderately severe.  He was able to continue.  He said he did not feel his heart racing or skipping before this.  He had no dizziness.  He had no chest pain or pressure and no difficulty breathing.  He just did not really know what happened, and he has been a lifelong exerciser and has never had any symptoms with exercise.  On 2016, he had taken his medications.  It was late in the evening (11:00 at night), and he went onto his front porch and, the next thing he knew, he found himself on the ground.  He is not sure that he had any syncope.  In fact, he thinks he did not; however, he sustained a severe right leg injury and had to have multiple surgeries to repair all of his quadriceps tendons on his right leg.  He does not know why this happened.  Again, before this, he had no chest pain or difficulty breathing.  In talking with him, he is on numerous medications and he takes all of them at night, and he had taken all of them right before this second incident in  12/2016.  Because his injury was so severe, he has not walked since 12/02/2016.  With his surgeries, he was in a wheelchair for a while and then he was on a walker and now he is with a cane and has a big brace on his leg.       He has one caffeinated beverage a day and one 16-ounce bottle of water a day.  He has two children.  He is  and is the president of [employer].  In his family, there is no premature cardiovascular disease.  His mother had strokes.  He does not use any alcohol and he does not smoke.        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  This was in 08/2015.  He has seen Dr. Sunny Mcelroy from ENT about his hearing loss.   He saw Dimas Richmond who felt that the lacunar infarct was due to hypertension.  He also felt that the falls were due to a lumbar problem.  He had carotid duplex done 6/2017 which showed mild bilateral carotid stenosis.     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 After starting him on metoprolol for SVT, the patient called in complaining of hypertension. He was started on hydrochlorothiazide 12.5 daily and his amlodipine was decreased. He was also having problems with leg swelling. His EF was 65%. He had mild dilation of the aortic root. He did have a CTA because of the mild dilation of the aortic root. 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 A 2.2 cm cystic lesion from the tail of the pancreas.      CT angiogram of the chest done 6/27/2019 show the aortic root at 4.4 cm, sinotubular junction 3.8 cm, a sending aorta 4.3 cm in the tubular portion.  This was unchanged from prior studies done in June 2018 in May 2017.     Labs on 3/5/2021 show glucose 118, otherwise  unremarkable CMP, creatinine was 1.3, hemoglobin A1c 5.9, normal TSH, normal CBC, Lipid panel showed total cholesterol 180, triglycerides 175, HDL 33, .  normal free kappa and lambda light chains on 7/1/2021.  CTA of the chest for follow-up for ascending aortic aneurysm done 9/3/2021 showed the aortic root to measure 4.4, the ascending aorta 4.3 which was stable from prior CT done 6/27/2019.  I did review the CT images and there is significant calcification of the proximal LAD and scattered calcification throughout the RCA.  He had a treadmill stress study on 10/1/2021 which showed no evidence of ischemia, exercised for 7 minutes 30 seconds on a Beka protocol without difficulty, normal blood pressure response exercise.      He had a carotid duplex study done 5/25/2022 which showed mild left internal carotid artery stenosis and plaquing of the right internal carotid artery without stenosis.  He had a CT angiogram of the chest done 10/19/2022 showing the aortic root measuring 4.7 cm, up from 4.6 cm in 5/25/2022.  I did review the CT images and there is scattered calcification in the left anterior descending artery but is otherwise widely patent, proximal circumflex is patent, first diagonal is patent, left main is patent, RCA is patent throughout, well visualized with calcification in the proximal vessel.    Labs on 1/12/2022 showed creatinine 1.24, otherwise unremarkable BMP, serum immunofixation showed low IgM at 37 otherwise normal, hemoglobin and A1c elevated 5.9%, protein electrophoresis normal.  He had free kappa lambda quantitative labs done 7/7/2021 which were normal.  Last lipid panel was 3/4/2021 triglycerides 1/7/1975, total cholesterol 180, HDL 33, , VLDL DL 35, TSH high at 5.8, normal vitamin D and uric acid levels.    He has no chest pain or pressure.  He has no tachycardia.  He had 1 episode of dizziness on September 25.  He said he got a bed and the room was spinning and he felt off  balance all day and could not really walk well without falling to the side.  This sounds like vertigo.  It really resolved by the next day and has had no recurrence of this.  He has had no syncope or falls.  He has no orthopnea or PND.  He checks his blood pressure at home and its been well controlled running 120s to 130s over 60s to 70s.  He follows Dr. Patel for pulmonary nodules.    Past Medical History:   Diagnosis Date   • Aneurysm ([facility]) 5 years ago    Annual monitoring   • Aortic root dilation ([facility]) 03/30/2022   • Ascending aorta dilatation ([facility]) 03/30/2022   • Bilateral carotid artery stenosis    • Coronary artery calcification 03/30/2022   • Essential hypertension    • Falls     3 unexplained   • Gout    • Hearing loss    • Hyperlipidemia    • Hypertension    • Snoring    • Stage 3a chronic kidney disease ([facility]) 03/30/2022   • Syncope          Past Surgical History:   Procedure Laterality Date   • KNEE SURGERY         Current Outpatient Medications on File Prior to Visit   Medication Sig Dispense Refill   • allopurinol (ZYLOPRIM) 100 MG tablet Take 1 tablet by mouth Daily.     • amLODIPine (NORVASC) 10 MG tablet Take 1 tablet by mouth Daily. 90 tablet 3   • aspirin 81 MG EC tablet Take 81 mg by mouth Daily.     • benazepril (LOTENSIN) 40 MG tablet Take 1 tablet by mouth Daily.     • levothyroxine (SYNTHROID, LEVOTHROID) 100 MCG tablet TAKE ONE TABLET BY MOUTH DAILY     • metoprolol succinate XL (TOPROL-XL) 50 MG 24 hr tablet Take 1.5 tablets by mouth 2 (Two) Times a Day. 270 tablet 3   • Multiple Vitamin (MULTI VITAMIN PO) Take 1 tablet by mouth Daily.     • rosuvastatin (CRESTOR) 20 MG tablet TAKE ONE TABLET BY MOUTH ONCE NIGHTLY 90 tablet 0   • sildenafil (REVATIO) 20 MG tablet Take 20 mg by mouth As Needed.     • spironolactone (ALDACTONE) 25 MG tablet Take 1 tablet by mouth Daily. 90 tablet 3   • tamsulosin (FLOMAX) 0.4 MG capsule 24 hr capsule Take 0.4 mg by mouth Daily.       No current  "facility-administered medications on file prior to visit.       Social History     Socioeconomic History   • Marital status:    Tobacco Use   • Smoking status: Never   • Smokeless tobacco: Never   Substance and Sexual Activity   • Alcohol use: Never     Comment: caffeine use: None   • Drug use: Never   • Sexual activity: Yes           ROS    Procedures    ECG 12 Lead    Date/Time: 10/21/2022 10:01 AM  Performed by: Aliza Saleh MD  Authorized by: Aliza Saleh MD   Comparison: compared with previous ECG   Similar to previous ECG  Rhythm: sinus rhythm    Clinical impression: normal ECG                Objective:      Vitals:    10/21/22 0951   BP: 130/78   Pulse: 58   SpO2: 97%   Weight: 136 kg (299 lb)   Height: 182.9 cm (72\")     Body mass index is 40.55 kg/m².    Vitals reviewed.   Constitutional:       General: Not in acute distress.     Appearance: Well-developed. Obese. Not diaphoretic.   Eyes:      General: No scleral icterus.     Conjunctiva/sclera: Conjunctivae normal.   HENT:      Head: Normocephalic and atraumatic.   Neck:      Thyroid: No thyromegaly.      Vascular: No carotid bruit or JVD.      Lymphadenopathy: No cervical adenopathy.   Pulmonary:      Effort: Pulmonary effort is normal. No respiratory distress.      Breath sounds: Normal breath sounds. No wheezing. No rhonchi. No rales.   Chest:      Chest wall: Not tender to palpatation.   Cardiovascular:      Normal rate. Regular rhythm.      Murmurs: There is no murmur.      No gallop.   Pulses:     Intact distal pulses.   Edema:     Peripheral edema absent.   Abdominal:      General: Bowel sounds are normal. There is no distension or abdominal bruit.      Palpations: Abdomen is soft. There is no abdominal mass.      Tenderness: There is no abdominal tenderness.   Musculoskeletal:         General: No deformity.      Extremities: No clubbing present.     Cervical back: Neck supple. Skin:     General: Skin is warm and dry. There " is no cyanosis.      Coloration: Skin is not pale.      Findings: No rash.   Neurological:      Mental Status: Alert and oriented to person, place, and time.      Cranial Nerves: No cranial nerve deficit.   Psychiatric:         Judgment: Judgment normal.         Lab Review:       Assessment:      Diagnosis Plan   1. Aortic root dilation (HCC)  CT Angiogram Chest    Lipid Panel    Magnesium    TSH    Comprehensive Metabolic Panel    CBC & Differential      2. Ascending aorta dilatation (HCC)  CT Angiogram Chest      3. Bilateral carotid artery stenosis        4. Coronary artery calcification  Lipid Panel    Magnesium    TSH    Comprehensive Metabolic Panel    CBC & Differential      5. Essential hypertension        6. Mixed hyperlipidemia        7. Stage 3a chronic kidney disease (HCC)        8. Elevated TSH          1. Two significant falls resulting in injury, both of them right-sided (right arm injury and severe right leg injury).    2. Hypertension, goal less than 120/80, well treated.  3. Hyperlipidemia.  4. Gout.    5. SVT on toprol and controlling it well  6. Dilated ascending aorta,  4.7 cm.  7. Right lumbar radicular pain, seeing Dr. Richmond.  Due to disc disease at L3-L4.   8. Coronary artery calcification in the LAD and RCA, on rosuvastatin 20 mg nightly.    Normal treadmill stress study done 10/2021.  9. Mild left internal carotid artery stenosis.     Plan:       Check laboratory values, no medication changes, repeat CTA chest in 1 year and see Tiffany in 1 year.  Advised cardiovascular exercise.

## 2022-11-01 ENCOUNTER — OFFICE VISIT (OUTPATIENT)
Dept: SURGERY | Facility: CLINIC | Age: 72
End: 2022-11-01

## 2022-11-01 VITALS
DIASTOLIC BLOOD PRESSURE: 64 MMHG | SYSTOLIC BLOOD PRESSURE: 120 MMHG | HEART RATE: 67 BPM | HEIGHT: 72 IN | BODY MASS INDEX: 31.02 KG/M2 | WEIGHT: 229 LBS | OXYGEN SATURATION: 98 %

## 2022-11-01 DIAGNOSIS — R91.1 LUNG NODULE: Primary | ICD-10-CM

## 2022-11-01 PROCEDURE — 99213 OFFICE O/P EST LOW 20 MIN: CPT | Performed by: THORACIC SURGERY (CARDIOTHORACIC VASCULAR SURGERY)

## 2022-11-01 NOTE — PROGRESS NOTES
"Chief Complaint  Follow-up lung nodule    Subjective        Dustin Wilkerson presents to [facility] THORACIC SURGERY  History of Present Illness     Dustin Ramires was seen in our office today for further follow-up of a 3 mm nodule in the right upper lobe of the lung found incidentally on a CT angiogram for dilation of the ascending aorta.  He is followed by Dr. Saleh of cardiology for the dilation of the aorta and has been referred to us for evaluation of this nodule.  Patient is a never smoker.  Since his last visit he reports no pulmonary issues.  He has had no cough or hemoptysis.  There is been no hoarseness or change in his voice.  He has had no pleuritic pain.  He has had no unexplained weight loss.  He is active without significant shortness of breath or wheezing.    Objective   Vital Signs:  /64 (BP Location: Left arm, Patient Position: Sitting, Cuff Size: Adult)   Pulse 67   Ht 182.9 cm (72\")   Wt 104 kg (229 lb)   SpO2 98%   BMI 31.06 kg/m²   Estimated body mass index is 31.06 kg/m² as calculated from the following:    Height as of this encounter: 182.9 cm (72\").    Weight as of this encounter: 104 kg (229 lb).      Physical Exam     Neck: No cervical or supraclavicular adenopathy    Pulmonary: Lungs are clear to auscultation with equal breath sounds bilaterally    Cardiac: Regular rate and rhythm.  No murmurs or gallops.  No dependent edema.    Abdomen: Soft and nontender.  No masses organomegaly.  Good bowel sounds in all quadrants.    Result Review :{Labs  Result Review  Imaging  Med Tab  Media  Procedures  :23}  The following data was reviewed by: Josr Patel III, MD on 11/01/2022:    CT scan of the chest performed October 19, 2022 was independently reviewed and compared to previous CT scans.  3 mm nodule in the right upper lobe of the lung is unchanged.  There are no new infiltrates nodules or masses.  No suspicious hilar or mediastinal adenopathy.  " No pleural effusions.  No bony abnormalities.         Assessment and Plan   Diagnoses and all orders for this visit:    1. Lung nodule (Primary)      Unlikely that this represents a malignancy.  Patient is scheduled for another CT angiogram in 1 year and I believe that that will be sufficient follow-up.  I have asked him to return here to the office after that CT scan in 1 year for evaluation.  If nodule is unchanged at that time then further follow-up in our office will not be necessary.  We will be glad to see him sooner if you feel its necessary.  Thank you for allowing us to participate in the care of Mr. Wilkerson.       I spent 27 minutes caring for Dustin on this date of service. This time includes time spent by me in the following activities:preparing for the visit, reviewing tests, performing a medically appropriate examination and/or evaluation , counseling and educating the patient/family/caregiver, ordering medications, tests, or procedures, referring and communicating with other health care professionals , documenting information in the medical record, independently interpreting results and communicating that information with the patient/family/caregiver and care coordination  Follow Up   Return in about 1 year (around 11/1/2023) for Recheck.  Patient was given instructions and counseling regarding his condition or for health maintenance advice. Please see specific information pulled into the AVS if appropriate.

## 2022-11-07 ENCOUNTER — LAB (OUTPATIENT)
Dept: LAB | Facility: HOSPITAL | Age: 72
End: 2022-11-07

## 2022-11-07 ENCOUNTER — TELEPHONE (OUTPATIENT)
Dept: CARDIOLOGY | Facility: CLINIC | Age: 72
End: 2022-11-07

## 2022-11-07 DIAGNOSIS — I25.10 CORONARY ARTERY CALCIFICATION: ICD-10-CM

## 2022-11-07 DIAGNOSIS — I77.810 AORTIC ROOT DILATION: ICD-10-CM

## 2022-11-07 DIAGNOSIS — I25.84 CORONARY ARTERY CALCIFICATION: ICD-10-CM

## 2022-11-07 LAB
ALBUMIN SERPL-MCNC: 4.3 G/DL (ref 3.5–5.2)
ALBUMIN/GLOB SERPL: 1.8 G/DL
ALP SERPL-CCNC: 53 U/L (ref 39–117)
ALT SERPL W P-5'-P-CCNC: 23 U/L (ref 1–41)
ANION GAP SERPL CALCULATED.3IONS-SCNC: 9.3 MMOL/L (ref 5–15)
AST SERPL-CCNC: 24 U/L (ref 1–40)
BASOPHILS # BLD AUTO: 0.04 10*3/MM3 (ref 0–0.2)
BASOPHILS NFR BLD AUTO: 0.7 % (ref 0–1.5)
BILIRUB SERPL-MCNC: 0.5 MG/DL (ref 0–1.2)
BUN SERPL-MCNC: 25 MG/DL (ref 8–23)
BUN/CREAT SERPL: 16.7 (ref 7–25)
CALCIUM SPEC-SCNC: 9.4 MG/DL (ref 8.6–10.5)
CHLORIDE SERPL-SCNC: 104 MMOL/L (ref 98–107)
CHOLEST SERPL-MCNC: 124 MG/DL (ref 0–200)
CO2 SERPL-SCNC: 24.7 MMOL/L (ref 22–29)
CREAT SERPL-MCNC: 1.5 MG/DL (ref 0.76–1.27)
DEPRECATED RDW RBC AUTO: 41.2 FL (ref 37–54)
EGFRCR SERPLBLD CKD-EPI 2021: 49.2 ML/MIN/1.73
EOSINOPHIL # BLD AUTO: 0.11 10*3/MM3 (ref 0–0.4)
EOSINOPHIL NFR BLD AUTO: 1.9 % (ref 0.3–6.2)
ERYTHROCYTE [DISTWIDTH] IN BLOOD BY AUTOMATED COUNT: 12.9 % (ref 12.3–15.4)
GLOBULIN UR ELPH-MCNC: 2.4 GM/DL
GLUCOSE SERPL-MCNC: 128 MG/DL (ref 65–99)
HCT VFR BLD AUTO: 41.3 % (ref 37.5–51)
HDLC SERPL-MCNC: 39 MG/DL (ref 40–60)
HGB BLD-MCNC: 14.4 G/DL (ref 13–17.7)
IMM GRANULOCYTES # BLD AUTO: 0.03 10*3/MM3 (ref 0–0.05)
IMM GRANULOCYTES NFR BLD AUTO: 0.5 % (ref 0–0.5)
LDLC SERPL CALC-MCNC: 66 MG/DL (ref 0–100)
LDLC/HDLC SERPL: 1.67 {RATIO}
LYMPHOCYTES # BLD AUTO: 1.93 10*3/MM3 (ref 0.7–3.1)
LYMPHOCYTES NFR BLD AUTO: 32.8 % (ref 19.6–45.3)
MAGNESIUM SERPL-MCNC: 2 MG/DL (ref 1.6–2.4)
MCH RBC QN AUTO: 30.7 PG (ref 26.6–33)
MCHC RBC AUTO-ENTMCNC: 34.9 G/DL (ref 31.5–35.7)
MCV RBC AUTO: 88.1 FL (ref 79–97)
MONOCYTES # BLD AUTO: 0.51 10*3/MM3 (ref 0.1–0.9)
MONOCYTES NFR BLD AUTO: 8.7 % (ref 5–12)
NEUTROPHILS NFR BLD AUTO: 3.26 10*3/MM3 (ref 1.7–7)
NEUTROPHILS NFR BLD AUTO: 55.4 % (ref 42.7–76)
NRBC BLD AUTO-RTO: 0 /100 WBC (ref 0–0.2)
PLATELET # BLD AUTO: 181 10*3/MM3 (ref 140–450)
PMV BLD AUTO: 8.7 FL (ref 6–12)
POTASSIUM SERPL-SCNC: 4.9 MMOL/L (ref 3.5–5.2)
PROT SERPL-MCNC: 6.7 G/DL (ref 6–8.5)
RBC # BLD AUTO: 4.69 10*6/MM3 (ref 4.14–5.8)
SODIUM SERPL-SCNC: 138 MMOL/L (ref 136–145)
TRIGL SERPL-MCNC: 99 MG/DL (ref 0–150)
TSH SERPL DL<=0.05 MIU/L-ACNC: 1.24 UIU/ML (ref 0.27–4.2)
VLDLC SERPL-MCNC: 19 MG/DL (ref 5–40)
WBC NRBC COR # BLD: 5.88 10*3/MM3 (ref 3.4–10.8)

## 2022-11-07 PROCEDURE — 83735 ASSAY OF MAGNESIUM: CPT

## 2022-11-07 PROCEDURE — 80061 LIPID PANEL: CPT

## 2022-11-07 PROCEDURE — 80053 COMPREHEN METABOLIC PANEL: CPT

## 2022-11-07 PROCEDURE — 85025 COMPLETE CBC W/AUTO DIFF WBC: CPT

## 2022-11-07 PROCEDURE — 36415 COLL VENOUS BLD VENIPUNCTURE: CPT

## 2022-11-07 PROCEDURE — 84443 ASSAY THYROID STIM HORMONE: CPT

## 2022-11-07 NOTE — TELEPHONE ENCOUNTER
Reviewed results with Dustin Wilkerson and patient verbalized understanding of results.    Thank you,  Eden Corbett RN  Triage Nurse [facility]

## 2022-11-07 NOTE — TELEPHONE ENCOUNTER
Attempted to call patient, no answer.  Left a voicemail for patient to call back.  Will continue to try to reach patient.    Shereen Dodd RN  [facility] Triage  11/07/22 12:43 EST

## 2022-12-30 RX ORDER — ROSUVASTATIN CALCIUM 20 MG/1
20 TABLET, COATED ORAL NIGHTLY
Qty: 90 TABLET | Refills: 3 | Status: SHIPPED | OUTPATIENT
Start: 2022-12-30

## 2022-12-30 NOTE — TELEPHONE ENCOUNTER
Patient requested a refill no Rosuvastatin 20 mg daily, she would like this sent to [facility] on [facility].    NOV-10/31/23-JF  LOV-10/21/22-RM    Plan:       Check laboratory values, no medication changes, repeat CTA chest in 1 year and see Tiffany in 1 year.  Advised cardiovascular exercise.

## 2023-04-28 RX ORDER — AMLODIPINE BESYLATE 10 MG/1
TABLET ORAL
Qty: 90 TABLET | Refills: 3 | Status: SHIPPED | OUTPATIENT
Start: 2023-04-28

## 2023-05-15 RX ORDER — SPIRONOLACTONE 25 MG/1
TABLET ORAL
Qty: 90 TABLET | Refills: 1 | Status: SHIPPED | OUTPATIENT
Start: 2023-05-15

## 2023-09-26 ENCOUNTER — OFFICE VISIT (OUTPATIENT)
Dept: CARDIOLOGY | Facility: CLINIC | Age: 73
End: 2023-09-26
Payer: MEDICARE

## 2023-09-26 ENCOUNTER — TELEPHONE (OUTPATIENT)
Dept: CARDIOLOGY | Facility: CLINIC | Age: 73
End: 2023-09-26
Payer: MEDICARE

## 2023-09-26 VITALS
WEIGHT: 210.1 LBS | BODY MASS INDEX: 28.46 KG/M2 | HEART RATE: 66 BPM | SYSTOLIC BLOOD PRESSURE: 104 MMHG | HEIGHT: 72 IN | OXYGEN SATURATION: 98 % | DIASTOLIC BLOOD PRESSURE: 68 MMHG

## 2023-09-26 DIAGNOSIS — R55 NEAR SYNCOPE: Primary | ICD-10-CM

## 2023-09-26 DIAGNOSIS — I77.810 ASCENDING AORTA DILATATION: ICD-10-CM

## 2023-09-26 DIAGNOSIS — I25.10 CORONARY ARTERY CALCIFICATION: ICD-10-CM

## 2023-09-26 DIAGNOSIS — I10 ESSENTIAL HYPERTENSION: ICD-10-CM

## 2023-09-26 DIAGNOSIS — I65.23 BILATERAL CAROTID ARTERY STENOSIS: ICD-10-CM

## 2023-09-26 DIAGNOSIS — I25.84 CORONARY ARTERY CALCIFICATION: ICD-10-CM

## 2023-09-26 DIAGNOSIS — I95.9 HYPOTENSION, UNSPECIFIED HYPOTENSION TYPE: ICD-10-CM

## 2023-09-26 DIAGNOSIS — N18.31 STAGE 3A CHRONIC KIDNEY DISEASE: ICD-10-CM

## 2023-09-26 DIAGNOSIS — E78.2 MIXED HYPERLIPIDEMIA: ICD-10-CM

## 2023-09-26 RX ORDER — METOPROLOL SUCCINATE 50 MG/1
50 TABLET, EXTENDED RELEASE ORAL 2 TIMES DAILY
Qty: 180 TABLET | Refills: 3 | Status: SHIPPED | OUTPATIENT
Start: 2023-09-26

## 2023-09-26 RX ORDER — AMLODIPINE BESYLATE 5 MG/1
5 TABLET ORAL DAILY
Qty: 90 TABLET | Refills: 3 | Status: SHIPPED | OUTPATIENT
Start: 2023-09-26

## 2023-09-26 RX ORDER — ZOLPIDEM TARTRATE 5 MG/1
2.5-5 TABLET ORAL NIGHTLY PRN
COMMUNITY
Start: 2023-09-14 | End: 2023-10-14

## 2023-09-26 NOTE — TELEPHONE ENCOUNTER
"Will try to have him come see me today. Will call him.     ----- Message from Michael Boston MA sent at 9/26/2023 11:16 AM EDT -----  Regarding: FW: Lanny  Contact: [phone]    ----- Message -----  From: Dustin iWlkerson \"Pat\"  Sent: 9/25/2023   2:25 PM EDT  To: [facility]  Subject: Lanny Saleh,  I am having some dizzy spells which have become more frequent in the last few weeks. My daughter suggested I  check my BP when I get dizzy . A few readings were under 100/60. I went to my Family Doctor a couple of weeks ago. He  suggested that I may need to back off of my BP medicine and suggested one Metoproal once a day instead of 1 1/2 pills twice a day. I wanted to check with you before I did this.  I have put off a trip to [place] this week as I do not want to drive being dizzy.  Some Background...  In late April Dr. Mahoney suggested I  lower my A1C a little and lose about 20 lbs. I took it seriously and changed my diet, ate much less than usual, and have lost a little over 25 lbs.so far.  I have been walking at least 2 miles day since May. (Over 70 miles in August). 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 I have been surprised at how low my readings were when taken during dizzy spells.   I am wondering if my lower body weight, exercise, and no business stress makes my current meds too much?  FYI,  I have an appointment for a CT scan to check my aortic aneurysm October 9, and I have an appointment with Amparo Perez on October 30. Do you suggest I move up the appointments or make any changes to meds prior to these appointments? Sorry to take your valuable time, but I am a little worried about this. Thank you! Randa Wilkerson    "

## 2023-09-26 NOTE — TELEPHONE ENCOUNTER
Called and spoke with patient. He is able to come to [facility] today before 2:30. Pt verbalized understanding. Sent to scheduling to put pt on schedule.    Thank you,    Christine Hayes RN  [place] [facility]  09/26/23 13:09 EDT

## 2023-10-09 ENCOUNTER — HOSPITAL ENCOUNTER (OUTPATIENT)
Dept: CT IMAGING | Facility: HOSPITAL | Age: 73
Discharge: HOME | End: 2023-10-09
Admitting: INTERNAL MEDICINE
Payer: MEDICARE

## 2023-10-09 DIAGNOSIS — I77.810 AORTIC ROOT DILATION: ICD-10-CM

## 2023-10-09 DIAGNOSIS — I77.810 ASCENDING AORTA DILATATION: ICD-10-CM

## 2023-10-09 PROCEDURE — 25510000001 IOPAMIDOL 61 % SOLUTION: Performed by: INTERNAL MEDICINE

## 2023-10-09 PROCEDURE — 71275 CT ANGIOGRAPHY CHEST: CPT

## 2023-10-09 RX ADMIN — IOPAMIDOL 100 ML: 612 INJECTION, SOLUTION INTRAVENOUS at 10:48

## 2023-10-10 ENCOUNTER — TELEPHONE (OUTPATIENT)
Dept: CARDIOLOGY | Facility: CLINIC | Age: 73
End: 2023-10-10
Payer: MEDICARE

## 2023-10-10 DIAGNOSIS — I27.82 CHRONIC PULMONARY EMBOLISM WITHOUT ACUTE COR PULMONALE, UNSPECIFIED PULMONARY EMBOLISM TYPE: Primary | ICD-10-CM

## 2023-10-10 DIAGNOSIS — R60.9 SWELLING: ICD-10-CM

## 2023-10-10 DIAGNOSIS — M79.605 LEG PAIN, BILATERAL: ICD-10-CM

## 2023-10-10 DIAGNOSIS — M79.604 LEG PAIN, BILATERAL: ICD-10-CM

## 2023-10-10 DIAGNOSIS — K86.2 PANCREATIC CYST: ICD-10-CM

## 2023-10-10 DIAGNOSIS — I26.99 ACUTE PULMONARY EMBOLISM WITHOUT ACUTE COR PULMONALE, UNSPECIFIED PULMONARY EMBOLISM TYPE: ICD-10-CM

## 2023-10-10 RX ORDER — RIVAROXABAN 15 MG-20MG
KIT ORAL
Qty: 51 EACH | Refills: 0 | Status: SHIPPED | OUTPATIENT
Start: 2023-10-10

## 2023-10-10 NOTE — TELEPHONE ENCOUNTER
I spoke with Dr. Yañez of radiology regarding CT results for Mr. Wilkerson.  His ascending aortic aneurysm is relatively stable at 4.6 cm.    He did notice a PE in the single branch of the left lower lobe with a small clot burden.  He was somewhat concerned that his pulmonary arteries to look a bit bigger versus 2019.  He is concerned that he is throwing small chronic emboli as he also show evidence of a possible right lower lobe PE in 2019.  He does have an elevated RV to LV ratio though this appears chronic and there are no secondary signs of heart failure.    Another finding was a cystic pancreatic lesion which has grown in size from 2.2 cm in 2019 to 3.2 cm now.  This is concerning for possible papillary neoplasm as he has no previous history of pancreatitis.  He recommended an MRI pancreas and GI referral.    I have ordered venous Doppler study to rule out DVT.    I have also put in referral to GI and hematology.    Thanks!  MAYNOR Meneses

## 2023-10-10 NOTE — TELEPHONE ENCOUNTER
Patient already has an appointment scheduled for 10/30/23.    Marguerite Warner RN  Triage [facility] [place]

## 2023-10-10 NOTE — TELEPHONE ENCOUNTER
I spoke with him -referrals to hematology and GI were made by Nohemi Perez.  Venous duplex study was made by Nohemi Perez for this PE and Xarelto starter pack for treatment of PE was also sent in.  He is aware of all of these different items.  He is having dizziness which may be inner ear we will see his PCP.  Schedule him to see Nohemi Perez in 3 months if he does not already have an appointment upcoming.

## 2023-10-10 NOTE — TELEPHONE ENCOUNTER
Patient is returning your call. He can be reached at [phone].    Marguerite Warner RN  [place] [facility]

## 2023-10-27 ENCOUNTER — TELEPHONE (OUTPATIENT)
Dept: SURGERY | Facility: CLINIC | Age: 73
End: 2023-10-27
Payer: MEDICARE

## 2023-10-30 ENCOUNTER — OFFICE VISIT (OUTPATIENT)
Dept: CARDIOLOGY | Facility: CLINIC | Age: 73
End: 2023-10-30
Payer: MEDICARE

## 2023-10-30 ENCOUNTER — HOSPITAL ENCOUNTER (OUTPATIENT)
Dept: ULTRASOUND IMAGING | Facility: HOSPITAL | Age: 73
Discharge: HOME | End: 2023-10-30
Admitting: NURSE PRACTITIONER
Payer: MEDICARE

## 2023-10-30 VITALS
SYSTOLIC BLOOD PRESSURE: 90 MMHG | HEART RATE: 65 BPM | HEIGHT: 72 IN | WEIGHT: 205.6 LBS | DIASTOLIC BLOOD PRESSURE: 60 MMHG | BODY MASS INDEX: 27.85 KG/M2

## 2023-10-30 DIAGNOSIS — I26.93 SINGLE SUBSEGMENTAL PULMONARY EMBOLISM WITHOUT ACUTE COR PULMONALE: ICD-10-CM

## 2023-10-30 DIAGNOSIS — I25.84 CORONARY ARTERY CALCIFICATION: Primary | ICD-10-CM

## 2023-10-30 DIAGNOSIS — R55 NEAR SYNCOPE: ICD-10-CM

## 2023-10-30 DIAGNOSIS — I63.312 CEREBRAL INFARCTION DUE TO THROMBOSIS OF LEFT MIDDLE CEREBRAL ARTERY: ICD-10-CM

## 2023-10-30 DIAGNOSIS — M79.604 LEG PAIN, BILATERAL: ICD-10-CM

## 2023-10-30 DIAGNOSIS — I27.82 CHRONIC PULMONARY EMBOLISM WITHOUT ACUTE COR PULMONALE, UNSPECIFIED PULMONARY EMBOLISM TYPE: ICD-10-CM

## 2023-10-30 DIAGNOSIS — N18.31 STAGE 3A CHRONIC KIDNEY DISEASE: ICD-10-CM

## 2023-10-30 DIAGNOSIS — I26.99 ACUTE PULMONARY EMBOLISM WITHOUT ACUTE COR PULMONALE, UNSPECIFIED PULMONARY EMBOLISM TYPE: ICD-10-CM

## 2023-10-30 DIAGNOSIS — I65.23 BILATERAL CAROTID ARTERY STENOSIS: ICD-10-CM

## 2023-10-30 DIAGNOSIS — E78.2 MIXED HYPERLIPIDEMIA: ICD-10-CM

## 2023-10-30 DIAGNOSIS — M79.605 LEG PAIN, BILATERAL: ICD-10-CM

## 2023-10-30 DIAGNOSIS — I77.810 ASCENDING AORTA DILATATION: ICD-10-CM

## 2023-10-30 DIAGNOSIS — R60.9 SWELLING: ICD-10-CM

## 2023-10-30 DIAGNOSIS — I25.10 CORONARY ARTERY CALCIFICATION: Primary | ICD-10-CM

## 2023-10-30 DIAGNOSIS — K86.2 PANCREATIC CYST: ICD-10-CM

## 2023-10-30 DIAGNOSIS — I77.810 AORTIC ROOT DILATION: ICD-10-CM

## 2023-10-30 DIAGNOSIS — I10 ESSENTIAL HYPERTENSION: ICD-10-CM

## 2023-10-30 PROCEDURE — 1159F MED LIST DOCD IN RCRD: CPT | Performed by: NURSE PRACTITIONER

## 2023-10-30 PROCEDURE — 3078F DIAST BP <80 MM HG: CPT | Performed by: NURSE PRACTITIONER

## 2023-10-30 PROCEDURE — 3074F SYST BP LT 130 MM HG: CPT | Performed by: NURSE PRACTITIONER

## 2023-10-30 PROCEDURE — 93000 ELECTROCARDIOGRAM COMPLETE: CPT | Performed by: NURSE PRACTITIONER

## 2023-10-30 PROCEDURE — 1160F RVW MEDS BY RX/DR IN RCRD: CPT | Performed by: NURSE PRACTITIONER

## 2023-10-30 PROCEDURE — 99214 OFFICE O/P EST MOD 30 MIN: CPT | Performed by: NURSE PRACTITIONER

## 2023-10-30 PROCEDURE — 93970 EXTREMITY STUDY: CPT

## 2023-10-30 NOTE — PROGRESS NOTES
[facility] CARDIOLOGY  [address]  [address]  [place] [address]  Phone: [phone]  Fax: [phone]  Patient Name: Dustin Wilkerson  :1950  Age: 73 y.o.  Primary Cardiologist: Aliza Saleh MD  Encounter Provider:  MAYNOR Peng    History of Present Illness     Dustin Wilkerson is a 73 y.o.  male whose medical history includes hypertension, hyperlipidemia, CKD 3, bilateral carotid artery stenosis, recurrent falls, history of left corona radiata lacunar infarct, monoclonal gammopathy, pulmonary nodules.  He is followed in our office by Dr. Saleh for ascending aorta dilation, coronary artery calcification, and history of near syncope. I have reviewed the past medical records in preparation of today's visit.     10/30/23 Follow-up:  He is here for 1 month follow-up and I am seeing him for the first time today.  At last visit he was seen for syncopal episode.  He reported that he lost 25 pounds of weight and his blood pressure was low; his metoprolol was decreased to 50 mg twice daily and amlodipine was decreased to 5 mg daily.  He also had a CTA chest which showed a small PE; he was referred to hematology and started on Xarelto.  He is scheduled to see hematology in a few weeks.  He was also referred to GI as a pancreatic cyst had gotten larger; he has not heard anything back from them yet.  His blood pressure is low in office today.  He brings a BP log with him that shows his blood pressure has been occasionally low but also normal.  When his blood pressure is low he does have dizziness.  He states he feels lightheaded with standing he is unsure if he has any leg swelling because he has neuropathy and he states his feet and legs sometimes always feel swollen.  He is walking 2-1/2 miles daily and not having any issues with this.  He denies chest pain, dyspnea, or orthopnea.  He is not having any palpitations.  He is not bleeding on  "the Xarelto; he is taking his medications as prescribed.    Data Review     The following data was reviewed by MAYNOR Peng on 10/30/23:    Vital Signs:   BP 90/60 (BP Location: Left arm)   Pulse 65   Ht 182.9 cm (72\")   Wt 93.3 kg (205 lb 9.6 oz)   BMI 27.88 kg/m²       Weight:  Wt Readings from Last 3 Encounters:   10/30/23 93.3 kg (205 lb 9.6 oz)   09/26/23 95.3 kg (210 lb 1.6 oz)   11/01/22 104 kg (229 lb)     Body mass index is 27.88 kg/m².    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  He has 1 caffeinated beverage daily and 1 bottle of water daily.  He does not use alcohol or smoke.  There is no history of premature cardiovascular disease in his family.  His mom had history of strokes.  2015 MRI of the head for hearing loss showed an old, chronic infarct in the left espinoza radiata.  Dr. Dimas Richmond felt this was due to to hypertension.  November 2016 he was walking down his yard, bent over to turn the hose off and fell.  He is not sure if he passed out but he did awake with right arm injury which was moderately severe.  He did not have any warning or preceding symptoms.  December 2016 he walked out on his porch late at night and then the next thing he knew he awoke on the ground.  Again he had no preceding symptoms or warning.  He sustained a severe right leg injury at that time and required multiple surgeries to repair these quadriceps of his right leg.  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  Echocardiogram at that time showed EF 65%, mild dilation of the aortic root.  CTA at that time showed a dilated aortic root, tubular ascending aorta measuring 4.7 cm and 4.5 cm respectively.  There was also a 2.2 cm cystic lesion of the tail of the " pancreas.  September 2021 CTA chest showed the aortic root to measure 4.4 cm, the ascending aorta measured 4.3 cm which was stable compared to CT done in June 2019.  Dr. Saleh reviewed the CT images and saw significant calcification of the proximal LAD and scattered calcification throughout the RCA.  October 2021 treadmill stress study showed no evidence of ischemia; he exercised for 7-1/2 minutes on a Beka protocol without difficulty and had normal BP response.  May 2022 carotid artery duplex showed mild left internal carotid artery stenosis and plaquing of the right internal carotid artery.  October 2022 CTA chest showed the aortic root to measure 4.7 cm which was up from 4.6 in May 2022.  Again the images were reviewed by Dr. Saleh who noted scattered calcification of the LAD but was otherwise patent, patent proximal left circumflex, patent first diagonal branch, patent left main coronary artery, and patent RCA but with calcification in the proximal vessel.  October 2023 CTA chest showed mild dilation of the ascending thoracic aorta measuring up to 4.6 cm, mild stenosis of less than 50% of the origin of the left subclavian artery, and moderate coronary artery calcification burden, a small pericardial effusion, moderate dilation of the main pulmonary arteries right greater than left, a small occlusive embolus within a proximal third order branch of the lower left pulmonary artery but no other discernible embolus.  His RV to LV ratio was normal.,  Stable cystic lesion of the body of the pancreas.    Labs:  4/28/2023:  cr 1.2, K 4.7, otherwise unremarkable CMP, Chol 125, HDL 44, LDL 64, Trig 88, Hgb 14.2, Plt 206, HgbA1c 6.5, TSH 1.640  09/1/2023:  cr 1.5, K 5.1, otherwise unremarkable BMP, HgbA1c 5.9      ECG 12 Lead    Date/Time: 10/30/2023 10:30 AM  Performed by: Miracle Perez APRN    Authorized by: Miracle Perez APRN  Comparison: compared with previous ECG from  9/26/2023  Similar to previous ECG  Rhythm: sinus rhythm  Rate: normal  BPM: 65  T inversion: V1  T flattening: III    Clinical impression: non-specific ECG          Medications     Allergies as of 10/30/2023    (No Known Allergies)       Current Outpatient Medications   Medication Instructions    allopurinol (ZYLOPRIM) 100 MG tablet 1 tablet, Oral, Daily    amLODIPine (NORVASC) 5 mg, Oral, Daily    aspirin 81 mg, Oral, Daily    benazepril (LOTENSIN) 40 MG tablet 1 tablet, Oral, Daily    levothyroxine (SYNTHROID, LEVOTHROID) 100 MCG tablet TAKE ONE TABLET BY MOUTH DAILY    metoprolol succinate XL (TOPROL-XL) 50 mg, Oral, 2 Times Daily    Multiple Vitamin (MULTI VITAMIN PO) 1 tablet, Oral, Daily    Rivaroxaban (Xarelto Starter Pack) tablet therapy pack starter pack Take as directed    rosuvastatin (CRESTOR) 20 mg, Oral, Nightly    sildenafil (REVATIO) 20 mg, Oral, As Needed    spironolactone (ALDACTONE) 25 MG tablet TAKE ONE TABLET BY MOUTH DAILY    tamsulosin (FLOMAX) 0.4 mg, Oral, Daily        Past History, Review of Systems, Exam     Past Medical History:   Diagnosis Date    Aneurysm 5 years ago    Aortic root dilation 03/30/2022    Ascending aorta dilatation 03/30/2022    Bilateral carotid artery stenosis     Coronary artery calcification 03/30/2022    Essential hypertension     Falls     GERD (gastroesophageal reflux disease)     Gout     Hearing loss     Hyperlipidemia     Hypertension     Lumbar spondylolysis     Neuropathy     Single subsegmental pulmonary embolism without acute cor pulmonale 10/30/2023    Snoring     Stage 3a chronic kidney disease 03/30/2022    Syncope        Past Surgical History:   has a past surgical history that includes Knee surgery (2016); Colonoscopy (2016); and Pancreas Biopsy.     Social History     Socioeconomic History    Marital status:      Spouse name: Jess   Tobacco Use    Smoking status: Never    Smokeless tobacco: Never   Substance and Sexual Activity    Alcohol  use: Never     Comment: caffeine use: None    Drug use: Never    Sexual activity: Yes       Review of Systems   Cardiovascular: Negative.    Neurological:  Positive for dizziness and paresthesias.       Vitals reviewed.   Constitutional:       Appearance: Not in distress.   Eyes:      Conjunctiva/sclera: Conjunctivae normal.      Pupils: Pupils are equal, round, and reactive to light.   HENT:      Head: Normocephalic.      Nose: Nose normal.    Mouth/Throat:      Pharynx: Oropharynx is clear.   Neck:      Vascular: JVD normal.   Pulmonary:      Effort: Pulmonary effort is normal.      Breath sounds: Normal breath sounds. No wheezing. No rhonchi. No rales.   Cardiovascular:      Normal rate. Regular rhythm. Normal S1. Normal S2.       Murmurs: There is no murmur.   Pulses:     Intact distal pulses.   Edema:     Peripheral edema absent.   Abdominal:      General: Bowel sounds are normal. There is no distension.      Palpations: Abdomen is soft.      Tenderness: There is no abdominal tenderness.   Musculoskeletal: Normal range of motion.      Cervical back: Normal range of motion and neck supple. Skin:     General: Skin is warm and dry.   Neurological:      Mental Status: Alert and oriented to person, place and time.   Psychiatric:         Attention and Perception: Attention normal.         Mood and Affect: Mood normal.         Speech: Speech normal.         Behavior: Behavior is cooperative.          Assessment and Plan     Assessment:  1. Coronary artery calcification    2. Bilateral carotid artery stenosis    3. Ascending aorta dilatation    4. Aortic root dilation    5. Essential hypertension    6. Mixed hyperlipidemia    7. Single subsegmental pulmonary embolism without acute cor pulmonale    8. Stage 3a chronic kidney disease    9. Cerebral infarction due to thrombosis of left middle cerebral artery     10. Pancreatic cyst    11. Near syncope         Coronary artery calcification: This was noted on previous CTA  chest.  October 2022 CTA chest showed scattered calcification of the LAD and of the proximal RCA; the other coronary arteries appear to be patent. He did have a treadmill stress test in October 2021 showing no evidence of ischemia.  His medical therapy includes aspirin, 20 mg rosuvastatin, and amlodipine.  He denies chest pain.  Bilateral carotid artery stenosis: Last imaging was May 2022 showing mild left internal carotid artery stenosis and plaquing of the right internal carotid artery.  He is on aspirin and rosuvastatin.  Ascending aorta dilation: This has been followed since April 2017 and has remained stable.  CTA chest in October 2023 showed the ascending thoracic aorta measuring 4.6 cm.  He also has known dilation of the aortic root.  It was noted to measure 4.7 cm in October 2022.  His blood pressure is well controlled.  Hypertension: His BP is low in office today and he is continue to have low readings at home but then normal on other days.  When blood pressure is low he does feel lightheaded.  He is not orthostatic in office today.  Hyperlipidemia: Lipids at goal when checked in April 2023; he is on 20 mg rosuvastatin daily.  Acute PE: October 2023 CTA chest did show a small occlusive embolus within a proximal third order branch of the left lower pulmonary artery; per radiology there was some concern that he had chronic PE.  His RV to LV ratio was normal.  He was started on Xarelto and has been referred to hematology.  CKD 3A: Renal functions been stable and in 2023.  History of CVA: He had an MRI hea in 2015 for hearing loss which showed an old, chronic infarct in the left corona radiata.  He is on aspirin and rosuvastatin.  Pancreatic cyst: This was previously noted on imaging and was stable but there was evidence of possible increase in size on October 2023 CTA chest; he is awaiting referral for GI.  Near syncope: He has had issues of near syncope dating back to November 2016.  No recent syncope but he  is having some dizziness with low blood pressure.    Mr. Wilkerson is a patient of Dr. Saleh's with history of recurrent syncope, ascending aorta dilation and dilated aortic root which has been stable, and history of old stroke.  His most recent CTA chest in October 2023 showed a small pulmonary embolus and he was started on Xarelto.  He has been referred to hematology.  I am going to check a Holter monitor to evaluate for arrhythmia.  I am also going to check echocardiogram as he continues to have some low blood pressure and dizziness.  I am going to stop his spironolactone.  I am going to resubmit the order for referral to GI to have his pancreatic cyst evaluated.  I will see him back in 4 weeks.    Return in about 4 weeks (around 11/27/2023) for Follow-up with MAYNOR Meneses.  Orders Placed This Encounter   Procedures    Ambulatory Referral to Gastroenterology    Holter Monitor - 72 Hour Up To 15 Days    ECG 12 Lead    Adult Transthoracic Echo Complete W/ Cont if Necessary Per Protocol      No orders of the defined types were placed in this encounter.        Thank you for the opportunity to participate in this patient's care.    MAYNOR Meneses    This office note has been dictated.

## 2023-10-31 ENCOUNTER — TELEPHONE (OUTPATIENT)
Dept: CARDIOLOGY | Facility: CLINIC | Age: 73
End: 2023-10-31
Payer: MEDICARE

## 2023-10-31 NOTE — TELEPHONE ENCOUNTER
Results called to pt.  Instructed to call with any further questions or concerns.  Verbalized understanding.    Aysa Christianson RN  Triage Nurse, [facility]  10/31/23 11:17 EDT

## 2023-11-01 NOTE — PROGRESS NOTES
"Chief Complaint  Follow-up, lung nodule    Subjective        Dustin Wilkerson presents to [facility] THORACIC SURGERY  History of Present Illness    Mr. Wilkerson is a very pleasant 73-year-old gentleman who is seen today in follow-up for a 3 mm nodule in the right upper lobe incidentally seen on CT angiogram for dilation of the ascending aorta.  He has been established in our practice with Dr. Patel since June 2022.  He is followed by Dr. Saleh of cardiology.  He is a never smoker.   He was recently seen by cardiology on 10/30/2023 and imitated on Xarelto for concern for pulmonary embolus. He has no new pulmonary symptoms.  He has had about a 30lb intentional weight loss this year with healthy diet and exercise. He is off of his blood pressure medications. He is active in his daily life. He presents today in his baseline state of health with CT chest for continued surveillance of the sub 4mm lung nodule in the RML.    Objective   Vital Signs:  /64 (BP Location: Left arm, Patient Position: Sitting, Cuff Size: Adult)   Pulse 63   Ht 182.9 cm (72\")   Wt 91.6 kg (202 lb)   BMI 27.40 kg/m²   Estimated body mass index is 27.4 kg/m² as calculated from the following:    Height as of this encounter: 182.9 cm (72\").    Weight as of this encounter: 91.6 kg (202 lb).             Physical Exam  Constitutional:       General: He is not in acute distress.     Appearance: Normal appearance. He is not ill-appearing.   HENT:      Head: Normocephalic and atraumatic.   Cardiovascular:      Rate and Rhythm: Normal rate.   Pulmonary:      Effort: Pulmonary effort is normal. No respiratory distress.   Musculoskeletal:         General: Normal range of motion.      Cervical back: Normal range of motion and neck supple.   Skin:     General: Skin is warm and dry.   Neurological:      General: No focal deficit present.      Mental Status: He is alert.   Psychiatric:         Mood and Affect: Mood normal. "        Result Review :          I have independently reviewed the CT angiogram of the chest performed on 10/9/2023 which demonstrates stable 4.6cm dilation of the ascending aorta.  There is a small pericardial effusion.  There is moderate dilation of the main pulmonary arteries right greater than left and a small occlusive embolus within the proximal third order branch of the lower left pulmonary artery.  There is some interval enlargement to the cystic lesion in the pancreatic body.  No significant mediastinal adenopathy.           Assessment and Plan   Diagnoses and all orders for this visit:    1. Lung nodule (Primary)  -     CT Chest Without Contrast; Future    Mr. Wilkerson's most recent CT chest demonstrates stable appearance of the 3 mm lung nodule in the right upper lobe without significant change.  He is followed closely by cardiology due to recurrent syncope. They saw him earlier this week and initiated him on Xarelto, ordered a Holter monitor as well as echocardiogram.  A referral was placed to GI for the pancreatic cyst, which is enlarged on recent imaging. Previously biopsied and was benign. The RML lung nodule is currently too small (3-4mm) to characterize with a biopsy or PET scan, but we will continue his surveillance with CT chest in 1 year and have him follow-up with us at that time.       I spent 30 minutes caring for Dustin on this date of service. This time includes time spent by me in the following activities:preparing for the visit, reviewing tests, obtaining and/or reviewing a separately obtained history, counseling and educating the patient/family/caregiver, ordering medications, tests, or procedures, documenting information in the medical record, independently interpreting results and communicating that information with the patient/family/caregiver, and care coordination    Follow Up   Return in about 1 year (around 11/2/2024) for Next scheduled follow up.  Patient was given instructions  and counseling regarding his condition or for health maintenance advice. Please see specific information pulled into the AVS if appropriate.

## 2023-11-02 ENCOUNTER — OFFICE VISIT (OUTPATIENT)
Dept: SURGERY | Facility: CLINIC | Age: 73
End: 2023-11-02
Payer: MEDICARE

## 2023-11-02 VITALS
DIASTOLIC BLOOD PRESSURE: 64 MMHG | HEART RATE: 63 BPM | WEIGHT: 202 LBS | BODY MASS INDEX: 27.36 KG/M2 | SYSTOLIC BLOOD PRESSURE: 112 MMHG | HEIGHT: 72 IN

## 2023-11-02 DIAGNOSIS — R91.1 LUNG NODULE: Primary | ICD-10-CM

## 2023-11-02 PROCEDURE — 3074F SYST BP LT 130 MM HG: CPT | Performed by: NURSE PRACTITIONER

## 2023-11-02 PROCEDURE — 99214 OFFICE O/P EST MOD 30 MIN: CPT | Performed by: NURSE PRACTITIONER

## 2023-11-02 PROCEDURE — 3078F DIAST BP <80 MM HG: CPT | Performed by: NURSE PRACTITIONER

## 2023-11-02 PROCEDURE — 1159F MED LIST DOCD IN RCRD: CPT | Performed by: NURSE PRACTITIONER

## 2023-11-02 PROCEDURE — 1160F RVW MEDS BY RX/DR IN RCRD: CPT | Performed by: NURSE PRACTITIONER

## 2023-11-02 RX ORDER — ZOLPIDEM TARTRATE 5 MG/1
TABLET ORAL
COMMUNITY
Start: 2023-10-18

## 2023-11-07 ENCOUNTER — OFFICE VISIT (OUTPATIENT)
Dept: GASTROENTEROLOGY | Facility: CLINIC | Age: 73
End: 2023-11-07
Payer: MEDICARE

## 2023-11-07 VITALS
OXYGEN SATURATION: 96 % | BODY MASS INDEX: 27.9 KG/M2 | SYSTOLIC BLOOD PRESSURE: 118 MMHG | HEIGHT: 72 IN | WEIGHT: 206 LBS | TEMPERATURE: 96.4 F | DIASTOLIC BLOOD PRESSURE: 70 MMHG | HEART RATE: 45 BPM

## 2023-11-07 DIAGNOSIS — K86.2 PANCREATIC CYST: Primary | ICD-10-CM

## 2023-11-07 PROCEDURE — 99203 OFFICE O/P NEW LOW 30 MIN: CPT | Performed by: INTERNAL MEDICINE

## 2023-11-07 PROCEDURE — 3074F SYST BP LT 130 MM HG: CPT | Performed by: INTERNAL MEDICINE

## 2023-11-07 PROCEDURE — 3078F DIAST BP <80 MM HG: CPT | Performed by: INTERNAL MEDICINE

## 2023-11-07 PROCEDURE — 1159F MED LIST DOCD IN RCRD: CPT | Performed by: INTERNAL MEDICINE

## 2023-11-07 PROCEDURE — 1160F RVW MEDS BY RX/DR IN RCRD: CPT | Performed by: INTERNAL MEDICINE

## 2023-11-09 ENCOUNTER — HOSPITAL ENCOUNTER (OUTPATIENT)
Dept: CARDIOLOGY | Facility: HOSPITAL | Age: 73
Discharge: HOME | End: 2023-11-09
Payer: MEDICARE

## 2023-11-09 ENCOUNTER — TELEPHONE (OUTPATIENT)
Dept: CARDIOLOGY | Facility: CLINIC | Age: 73
End: 2023-11-09
Payer: MEDICARE

## 2023-11-09 VITALS — BODY MASS INDEX: 27.9 KG/M2 | HEIGHT: 72 IN | WEIGHT: 206 LBS

## 2023-11-09 DIAGNOSIS — I26.93 SINGLE SUBSEGMENTAL PULMONARY EMBOLISM WITHOUT ACUTE COR PULMONALE: ICD-10-CM

## 2023-11-09 DIAGNOSIS — K86.2 PANCREATIC CYST: ICD-10-CM

## 2023-11-09 DIAGNOSIS — R55 NEAR SYNCOPE: ICD-10-CM

## 2023-11-09 LAB
AORTIC DIMENSIONLESS INDEX: 0.8 (DI)
BH CV ECHO MEAS - AO MAX PG: 8.3 MMHG
BH CV ECHO MEAS - AO MEAN PG: 4 MMHG
BH CV ECHO MEAS - AO ROOT DIAM: 3.9 CM
BH CV ECHO MEAS - AO V2 MAX: 144 CM/SEC
BH CV ECHO MEAS - AO V2 VTI: 30.8 CM
BH CV ECHO MEAS - AVA(I,D): 2.8 CM2
BH CV ECHO MEAS - EDV(CUBED): 140.6 ML
BH CV ECHO MEAS - EDV(MOD-SP2): 103 ML
BH CV ECHO MEAS - EDV(MOD-SP4): 102 ML
BH CV ECHO MEAS - EF(MOD-BP): 59.2 %
BH CV ECHO MEAS - EF(MOD-SP2): 60.2 %
BH CV ECHO MEAS - EF(MOD-SP4): 58.8 %
BH CV ECHO MEAS - ESV(CUBED): 38.4 ML
BH CV ECHO MEAS - ESV(MOD-SP2): 41 ML
BH CV ECHO MEAS - ESV(MOD-SP4): 42 ML
BH CV ECHO MEAS - FS: 35.1 %
BH CV ECHO MEAS - IVS/LVPW: 1 CM
BH CV ECHO MEAS - IVSD: 1.1 CM
BH CV ECHO MEAS - LAT PEAK E' VEL: 8.5 CM/SEC
BH CV ECHO MEAS - LV DIASTOLIC VOL/BSA (35-75): 47.3 CM2
BH CV ECHO MEAS - LV MASS(C)D: 220.8 GRAMS
BH CV ECHO MEAS - LV MAX PG: 4.6 MMHG
BH CV ECHO MEAS - LV MEAN PG: 2 MMHG
BH CV ECHO MEAS - LV SYSTOLIC VOL/BSA (12-30): 19.5 CM2
BH CV ECHO MEAS - LV V1 MAX: 107 CM/SEC
BH CV ECHO MEAS - LV V1 VTI: 23.5 CM
BH CV ECHO MEAS - LVIDD: 5.2 CM
BH CV ECHO MEAS - LVIDS: 3.4 CM
BH CV ECHO MEAS - LVOT AREA: 3.7 CM2
BH CV ECHO MEAS - LVOT DIAM: 2.18 CM
BH CV ECHO MEAS - LVPWD: 1.1 CM
BH CV ECHO MEAS - MED PEAK E' VEL: 5.5 CM/SEC
BH CV ECHO MEAS - MR MAX PG: 113.9 MMHG
BH CV ECHO MEAS - MR MAX VEL: 533.6 CM/SEC
BH CV ECHO MEAS - MV A DUR: 0.09 SEC
BH CV ECHO MEAS - MV A MAX VEL: 77.6 CM/SEC
BH CV ECHO MEAS - MV DEC SLOPE: 396.2 CM/SEC2
BH CV ECHO MEAS - MV DEC TIME: 269 SEC
BH CV ECHO MEAS - MV E MAX VEL: 83.8 CM/SEC
BH CV ECHO MEAS - MV E/A: 1.08
BH CV ECHO MEAS - MV MAX PG: 3.7 MMHG
BH CV ECHO MEAS - MV MEAN PG: 1.52 MMHG
BH CV ECHO MEAS - MV P1/2T: 72 MSEC
BH CV ECHO MEAS - MV V2 VTI: 35.4 CM
BH CV ECHO MEAS - MVA(P1/2T): 3.1 CM2
BH CV ECHO MEAS - MVA(VTI): 2.47 CM2
BH CV ECHO MEAS - PA ACC TIME: 0.1 SEC
BH CV ECHO MEAS - PA V2 MAX: 88.2 CM/SEC
BH CV ECHO MEAS - PULM A REVS DUR: 0.07 SEC
BH CV ECHO MEAS - PULM A REVS VEL: 32.3 CM/SEC
BH CV ECHO MEAS - PULM DIAS VEL: 49.7 CM/SEC
BH CV ECHO MEAS - PULM S/D: 1.45
BH CV ECHO MEAS - PULM SYS VEL: 72.2 CM/SEC
BH CV ECHO MEAS - RAP SYSTOLE: 3 MMHG
BH CV ECHO MEAS - RV MAX PG: 2.6 MMHG
BH CV ECHO MEAS - RV V1 MAX: 80.2 CM/SEC
BH CV ECHO MEAS - RV V1 VTI: 21.8 CM
BH CV ECHO MEAS - RVSP: 19.6 MMHG
BH CV ECHO MEAS - SI(MOD-SP2): 28.7 ML/M2
BH CV ECHO MEAS - SI(MOD-SP4): 27.8 ML/M2
BH CV ECHO MEAS - SV(LVOT): 87.5 ML
BH CV ECHO MEAS - SV(MOD-SP2): 62 ML
BH CV ECHO MEAS - SV(MOD-SP4): 60 ML
BH CV ECHO MEAS - TAPSE (>1.6): 1.8 CM
BH CV ECHO MEAS - TR MAX PG: 16.6 MMHG
BH CV ECHO MEAS - TR MAX VEL: 203.7 CM/SEC
BH CV ECHO MEASUREMENTS AVERAGE E/E' RATIO: 11.97
BH CV XLRA - RV BASE: 3.4 CM
BH CV XLRA - TDI S': 14.3 CM/SEC
LEFT ATRIUM VOLUME INDEX: 24.8 ML/M2

## 2023-11-09 PROCEDURE — 93306 TTE W/DOPPLER COMPLETE: CPT

## 2023-11-09 NOTE — TELEPHONE ENCOUNTER
Please let him know that his echocardiogram was normal.  How is his dizziness and blood pressure doing off spironolactone?    Has he heard about the GI referral?    Thanks!  MAYNOR Meneses

## 2023-11-10 NOTE — TELEPHONE ENCOUNTER
Have him stop his amlodipine. With the black stool, he can see the new GI or let Dr. Lee know. Is his colonoscopy up to date? Did his PCP check his labs?    Thanks!  MAYNOR Meneses

## 2023-11-10 NOTE — TELEPHONE ENCOUNTER
Left voicemail for Dustin Wilkerson requesting callback.    Thank you,  Eden MENDOZA RN  Triage Nurse [facility]   08:33 EST

## 2023-11-10 NOTE — TELEPHONE ENCOUNTER
Pt will stop the amlodipine.  He also wanted to let you know that his PCP had him stop the baby aspirin as well, since he is on the xarelto.  Pt states that his last colonoscopy was at least 5 yrs ago, but that he wasn't supposed to be due until 2025.  He states that he will go ahead and call Dr. Lee's office and let him know about the black stools.  His PCP did check labs (I can see them from [place] in [facility]).    Asya Christianson, RN  Triage RN  11/10/23 11:24 EST

## 2023-11-10 NOTE — TELEPHONE ENCOUNTER
Pt returned call.  Results and recommendations reviewed with pt.  Instructed to call with any further questions or concerns.  Verbalized understanding.    Nohemi- pt states that his dizziness is not happening as often, but still has it occasionally.  BP is doing well, mostly 110's/60-70's.  He saw Dr. Lee, GI on Tuesday to discuss cyst on pancreas.  He did not think he would need surgery, but is referring him to a surgeon specializing in the pancreas to check.    Pt also states that he has had black stool since starting Xarelto, and has forgotten to tell you (also forgot to tell GI on Tuesday).  He did discuss it with his PCP, who referred him to a different GI doctor.  Pt would like to know if he should go to the new one, or see the one you sent him to about this too.  Any further recommendations?    Asya Christianson, RN  Triage Nurse, [facility] [place]  11/10/23 08:40 EST

## 2023-11-15 ENCOUNTER — LAB (OUTPATIENT)
Dept: OTHER | Facility: HOSPITAL | Age: 73
End: 2023-11-15
Payer: MEDICARE

## 2023-11-15 ENCOUNTER — CONSULT (OUTPATIENT)
Dept: ONCOLOGY | Facility: CLINIC | Age: 73
End: 2023-11-15
Payer: MEDICARE

## 2023-11-15 VITALS
HEIGHT: 71 IN | DIASTOLIC BLOOD PRESSURE: 73 MMHG | HEART RATE: 57 BPM | OXYGEN SATURATION: 98 % | RESPIRATION RATE: 16 BRPM | SYSTOLIC BLOOD PRESSURE: 116 MMHG | BODY MASS INDEX: 28.94 KG/M2 | TEMPERATURE: 97.7 F | WEIGHT: 206.7 LBS

## 2023-11-15 DIAGNOSIS — D64.9 NORMOCHROMIC NORMOCYTIC ANEMIA: ICD-10-CM

## 2023-11-15 DIAGNOSIS — K86.9 PANCREATIC LESION: ICD-10-CM

## 2023-11-15 DIAGNOSIS — I26.93 SINGLE SUBSEGMENTAL PULMONARY EMBOLISM WITHOUT ACUTE COR PULMONALE: Primary | ICD-10-CM

## 2023-11-15 DIAGNOSIS — I26.93 SINGLE SUBSEGMENTAL PULMONARY EMBOLISM WITHOUT ACUTE COR PULMONALE: ICD-10-CM

## 2023-11-15 DIAGNOSIS — K62.5 BLOOD PER RECTUM: ICD-10-CM

## 2023-11-15 LAB
BASOPHILS # BLD AUTO: 0.03 10*3/MM3 (ref 0–0.2)
BASOPHILS NFR BLD AUTO: 0.5 % (ref 0–1.5)
DEPRECATED RDW RBC AUTO: 45.1 FL (ref 37–54)
EOSINOPHIL # BLD AUTO: 0.14 10*3/MM3 (ref 0–0.4)
EOSINOPHIL NFR BLD AUTO: 2.6 % (ref 0.3–6.2)
ERYTHROCYTE [DISTWIDTH] IN BLOOD BY AUTOMATED COUNT: 13.8 % (ref 12.3–15.4)
FERRITIN SERPL-MCNC: 40.7 NG/ML (ref 30–400)
FOLATE SERPL-MCNC: 19.6 NG/ML (ref 4.78–24.2)
HCT VFR BLD AUTO: 37.6 % (ref 37.5–51)
HGB BLD-MCNC: 12.8 G/DL (ref 13–17.7)
HGB RETIC QN AUTO: 31.9 PG (ref 29.8–36.1)
IMM GRANULOCYTES # BLD AUTO: 0.02 10*3/MM3 (ref 0–0.05)
IMM GRANULOCYTES NFR BLD AUTO: 0.4 % (ref 0–0.5)
IMM RETICS NFR: 19.2 % (ref 3–15.8)
IRON 24H UR-MRATE: 71 MCG/DL (ref 59–158)
IRON SATN MFR SERPL: 19 % (ref 20–50)
LYMPHOCYTES # BLD AUTO: 1.7 10*3/MM3 (ref 0.7–3.1)
LYMPHOCYTES NFR BLD AUTO: 31.1 % (ref 19.6–45.3)
MCH RBC QN AUTO: 30.3 PG (ref 26.6–33)
MCHC RBC AUTO-ENTMCNC: 34 G/DL (ref 31.5–35.7)
MCV RBC AUTO: 89.1 FL (ref 79–97)
MONOCYTES # BLD AUTO: 0.46 10*3/MM3 (ref 0.1–0.9)
MONOCYTES NFR BLD AUTO: 8.4 % (ref 5–12)
NEUTROPHILS NFR BLD AUTO: 3.11 10*3/MM3 (ref 1.7–7)
NEUTROPHILS NFR BLD AUTO: 57 % (ref 42.7–76)
NRBC BLD AUTO-RTO: 0 /100 WBC (ref 0–0.2)
PLATELET # BLD AUTO: 175 10*3/MM3 (ref 140–450)
PMV BLD AUTO: 9.2 FL (ref 6–12)
RBC # BLD AUTO: 4.22 10*6/MM3 (ref 4.14–5.8)
RETICS # AUTO: 0.12 10*6/MM3 (ref 0.02–0.13)
RETICS/RBC NFR AUTO: 3.02 % (ref 0.7–1.9)
TIBC SERPL-MCNC: 374 MCG/DL (ref 298–536)
TRANSFERRIN SERPL-MCNC: 251 MG/DL (ref 200–360)
VIT B12 BLD-MCNC: 961 PG/ML (ref 211–946)
WBC NRBC COR # BLD: 5.46 10*3/MM3 (ref 3.4–10.8)

## 2023-11-15 PROCEDURE — 85306 CLOT INHIBIT PROT S FREE: CPT | Performed by: INTERNAL MEDICINE

## 2023-11-15 PROCEDURE — 85302 CLOT INHIBIT PROT C ANTIGEN: CPT | Performed by: INTERNAL MEDICINE

## 2023-11-15 PROCEDURE — 84466 ASSAY OF TRANSFERRIN: CPT | Performed by: INTERNAL MEDICINE

## 2023-11-15 PROCEDURE — 82728 ASSAY OF FERRITIN: CPT | Performed by: INTERNAL MEDICINE

## 2023-11-15 PROCEDURE — 86146 BETA-2 GLYCOPROTEIN ANTIBODY: CPT | Performed by: INTERNAL MEDICINE

## 2023-11-15 PROCEDURE — 85025 COMPLETE CBC W/AUTO DIFF WBC: CPT | Performed by: INTERNAL MEDICINE

## 2023-11-15 PROCEDURE — 85300 ANTITHROMBIN III ACTIVITY: CPT | Performed by: INTERNAL MEDICINE

## 2023-11-15 PROCEDURE — 85303 CLOT INHIBIT PROT C ACTIVITY: CPT | Performed by: INTERNAL MEDICINE

## 2023-11-15 PROCEDURE — 86147 CARDIOLIPIN ANTIBODY EA IG: CPT | Performed by: INTERNAL MEDICINE

## 2023-11-15 PROCEDURE — 82746 ASSAY OF FOLIC ACID SERUM: CPT | Performed by: INTERNAL MEDICINE

## 2023-11-15 PROCEDURE — 81241 F5 GENE: CPT | Performed by: INTERNAL MEDICINE

## 2023-11-15 PROCEDURE — 36415 COLL VENOUS BLD VENIPUNCTURE: CPT | Performed by: INTERNAL MEDICINE

## 2023-11-15 PROCEDURE — 83921 ORGANIC ACID SINGLE QUANT: CPT | Performed by: INTERNAL MEDICINE

## 2023-11-15 PROCEDURE — 83540 ASSAY OF IRON: CPT | Performed by: INTERNAL MEDICINE

## 2023-11-15 PROCEDURE — 85046 RETICYTE/HGB CONCENTRATE: CPT | Performed by: INTERNAL MEDICINE

## 2023-11-15 PROCEDURE — 82607 VITAMIN B-12: CPT | Performed by: INTERNAL MEDICINE

## 2023-11-15 PROCEDURE — 81240 F2 GENE: CPT | Performed by: INTERNAL MEDICINE

## 2023-11-15 PROCEDURE — 85305 CLOT INHIBIT PROT S TOTAL: CPT | Performed by: INTERNAL MEDICINE

## 2023-11-15 NOTE — PROGRESS NOTES
Chief Complaint   Patient presents with   • pancreatic cyst   • Pulmonary Embolism     Dustin Wilkerson is a 73 y.o. male who presents with a history of enlarging pancreatic cyst  HPI    Patient 73-year-old male with history of hypertension, hyperlipidemia and GERD presenting for evaluation for enlarging pancreatic cyst.  Patient noted with the cyst years ago did undergo endoscopic ultrasound and sampling and was told that the findings were of benign cystic lesion.  Patient had ongoing monitoring when noted progressive enlargement of the cyst patient referred for further recommendations.  Patient understands this is not endoscopically accessible or drainable here for further recommendations.  Patient without weight loss no nausea vomiting fever chills no jaundice change in diet or appetite.    Past Medical History:   Diagnosis Date   • Aneurysm 5 years ago    Annual monitoring   • Aortic root dilation 03/30/2022   • Ascending aorta dilatation 03/30/2022   • Bilateral carotid artery stenosis    • Coronary artery calcification 03/30/2022   • Essential hypertension    • Falls     3 unexplained   • GERD (gastroesophageal reflux disease)    • Gout    • Hearing loss    • Hyperlipidemia    • Hypertension    • Lumbar spondylolysis    • Neuropathy    • Single subsegmental pulmonary embolism without acute cor pulmonale 10/30/2023   • Snoring    • Stage 3a chronic kidney disease 03/30/2022   • Syncope        Current Outpatient Medications:   •  allopurinol (ZYLOPRIM) 100 MG tablet, Take 1 tablet by mouth Daily., Disp: , Rfl:   •  aspirin 81 MG EC tablet, Take 1 tablet by mouth Daily., Disp: , Rfl:   •  benazepril (LOTENSIN) 40 MG tablet, Take 1 tablet by mouth Daily., Disp: , Rfl:   •  levothyroxine (SYNTHROID, LEVOTHROID) 100 MCG tablet, TAKE ONE TABLET BY MOUTH DAILY, Disp: , Rfl:   •  metoprolol succinate XL (TOPROL-XL) 50 MG 24 hr tablet, Take 1 tablet by mouth 2 (Two) Times a Day., Disp: 180 tablet, Rfl: 3  •  Multiple  Vitamin (MULTI VITAMIN PO), Take 1 tablet by mouth Daily., Disp: , Rfl:   •  Rivaroxaban (Xarelto Starter Pack) tablet therapy pack starter pack, Take one 15 mg tablet twice daily with food for 21 days.  Followed by one 20 mg tablet by mouth once daily with food. Take as directed, Disp: 51 each, Rfl: 0  •  rosuvastatin (CRESTOR) 20 MG tablet, Take 1 tablet by mouth Every Night., Disp: 90 tablet, Rfl: 3  •  sildenafil (REVATIO) 20 MG tablet, Take 1 tablet by mouth As Needed., Disp: , Rfl:   •  tamsulosin (FLOMAX) 0.4 MG capsule 24 hr capsule, Take 1 capsule by mouth Daily., Disp: , Rfl:   •  zolpidem (AMBIEN) 5 MG tablet, Take 1 tablet by mouth At Night As Needed., Disp: , Rfl:   No Known Allergies  Social History     Socioeconomic History   • Marital status:      Spouse name: Jess   Tobacco Use   • Smoking status: Never   • Smokeless tobacco: Never   Vaping Use   • Vaping Use: Never used   Substance and Sexual Activity   • Alcohol use: Never     Comment: caffeine use: None   • Drug use: Never   • Sexual activity: Yes     Partners: Female     Birth control/protection: None     Family History   Problem Relation Age of Onset   • Stroke Mother    • Dementia Mother    • Early death Father    • Heart disease Maternal Grandfather      Review of Systems   Constitutional: Negative.    HENT: Negative.     Eyes: Negative.    Respiratory: Negative.     Cardiovascular: Negative.    Gastrointestinal: Negative.    Endocrine: Negative.    Musculoskeletal: Negative.    Skin: Negative.    Allergic/Immunologic: Negative.    Hematological: Negative.    Vitals:    11/07/23 1547   BP: 118/70   Pulse: (!) 45   Temp: 96.4 °F (35.8 °C)   SpO2: 96%     Physical Exam  Vitals and nursing note reviewed.   Constitutional:       Appearance: Normal appearance. He is well-developed and normal weight.   HENT:      Head: Normocephalic and atraumatic.   Eyes:      General: No scleral icterus.     Conjunctiva/sclera: Conjunctivae normal.       Pupils: Pupils are equal, round, and reactive to light.   Neck:      Trachea: No tracheal deviation.   Cardiovascular:      Rate and Rhythm: Normal rate and regular rhythm.      Heart sounds: No murmur heard.     No friction rub. No gallop.   Pulmonary:      Effort: Pulmonary effort is normal. No respiratory distress.      Breath sounds: Normal breath sounds. No wheezing or rales.   Abdominal:      General: Bowel sounds are normal. There is no distension.      Palpations: Abdomen is soft. There is no mass.      Tenderness: There is no abdominal tenderness. There is no guarding or rebound.   Musculoskeletal:         General: No swelling or tenderness. Normal range of motion.      Cervical back: Normal range of motion and neck supple. No rigidity.   Skin:     General: Skin is warm and dry.      Coloration: Skin is not jaundiced.      Findings: No rash.   Neurological:      General: No focal deficit present.      Mental Status: He is alert and oriented to person, place, and time.      Cranial Nerves: No cranial nerve deficit.   Psychiatric:         Behavior: Behavior normal.         Thought Content: Thought content normal.         Judgment: Judgment normal.   Diagnoses and all orders for this visit:    Pancreatic cyst  -     Ambulatory Referral to Surgical Oncology    Patient 73-year-old male with history of hypertension, hyperlipidemia being followed for pancreatic cyst previously underwent EUS sampling determined that this would be a benign cystic area has been noted with progressive enlargement.  Patient with no other abnormal findings referred for further recommendations.  At this point would recommend evaluation by pancreatic surgery sending with the cyst is enlarged significantly over the last several imaging studies to consider whether local resection would be appropriate.  Will refer to Dr. Rios for his opinion.

## 2023-11-15 NOTE — PROGRESS NOTES
Subjective     REASON FOR CONSULTATION:  Provide an opinion on any further workup or treatment on:    Pulmonary embolism                       REQUESTING PHYSICIAN: Miracle Perez*    RECORDS OBTAINED: Records of the patients history including those obtained from the referring provider were reviewed and summarized in detail.    HISTORY OF PRESENT ILLNESS:    Dustin Wilkerson is a 73 y.o. patient who was referred for evaluation of pulmonary embolism.  He has aortic aneurysm and follows with cardiology.  He has CT angiogram done annually.  CT scan on 10/9/2023 revealed a pulmonary embolus in the left lower lung.  The radiologist reviewed previous scans and indicated that the patient had a pulmonary embolus in the right lung in 2019.  The patient was on aspirin 81 mg daily.  After the diagnosis of pulmonary embolism, he was started on Xarelto and aspirin was discontinued.    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  In August 2023, he traveled by car to [place] and drove about 1200 miles over 2 days.  He stayed for 7 days and drove back over 2 days.  He did not notice any swelling in the legs.  No skin changes.  He reported having intermittent muscle achiness that he attributed to activities and playing softball.    Patient reports noticing blood in the stool.  It was when he was taking the starter pack of Xarelto.  It stopped 3 days after he started 3 days after he started his second month supply of Xarelto.    Patient is not having abdominal pain.  No chest pain or shortness of breath at present.  However, he reports having dizziness and lightheadedness.  He found that he had low blood pressure while he was exercising and this was attributed to the effect of weight loss making him require a lower dose of his antihypertensives.  After the doses were reduced, the dizziness/lightheadedness improved but did not completely resolve.    Patient has no family history of  DVT/PE.  His mother who had dementia had stroke later in her life.      REVIEW OF SYSTEMS:  Review of Systems   Constitutional:  Negative for fatigue and fever.        Intentional weight loss   HENT:  Positive for hearing loss. Negative for nosebleeds and voice change.    Eyes:  Negative for visual disturbance.   Respiratory:  Negative for cough and shortness of breath.    Cardiovascular:  Negative for chest pain and leg swelling.   Gastrointestinal:  Positive for blood in stool. Negative for abdominal pain, constipation, diarrhea, nausea and vomiting.        Hemorrhoids   Genitourinary:  Negative for frequency and hematuria.   Musculoskeletal:  Negative for back pain and joint swelling.   Skin:  Negative for rash.   Neurological:  Negative for dizziness and headaches.   Hematological:  Negative for adenopathy. Does not bruise/bleed easily.   Psychiatric/Behavioral:  Negative for dysphoric mood. The patient is not nervous/anxious.       Past Medical History:   Diagnosis Date    Aneurysm 5 years ago    Annual monitoring    Aortic root dilation 03/30/2022    Ascending aorta dilatation 03/30/2022    Bilateral carotid artery stenosis     Coronary artery calcification 03/30/2022    Essential hypertension     Falls     3 unexplained    GERD (gastroesophageal reflux disease)     Gout     Hearing loss     Hyperlipidemia     Hypertension     Lumbar spondylolysis     Neuropathy     Single subsegmental pulmonary embolism without acute cor pulmonale 10/30/2023    Snoring     Stage 3a chronic kidney disease 03/30/2022    Syncope      Past Surgical History:   Procedure Laterality Date    COLONOSCOPY  2016    KNEE SURGERY  2016    Right tendon repair quadriceps    PANCREAS BIOPSY       Social History     Socioeconomic History    Marital status:      Spouse name: Jess   Tobacco Use    Smoking status: Never    Smokeless tobacco: Never   Vaping Use    Vaping Use: Never used   Substance and Sexual Activity    Alcohol use:  "Never     Comment: caffeine use: None    Drug use: Never    Sexual activity: Yes     Partners: Female     Birth control/protection: None     Family History   Problem Relation Age of Onset    Stroke Mother     Dementia Mother     Early death Father     Heart disease Maternal Grandfather       MEDICATIONS:    Current Outpatient Medications:     allopurinol (ZYLOPRIM) 100 MG tablet, Take 1 tablet by mouth Daily., Disp: , Rfl:     benazepril (LOTENSIN) 40 MG tablet, Take 1 tablet by mouth Daily., Disp: , Rfl:     levothyroxine (SYNTHROID, LEVOTHROID) 100 MCG tablet, TAKE ONE TABLET BY MOUTH DAILY, Disp: , Rfl:     metoprolol succinate XL (TOPROL-XL) 50 MG 24 hr tablet, Take 1 tablet by mouth 2 (Two) Times a Day., Disp: 180 tablet, Rfl: 3    Multiple Vitamin (MULTI VITAMIN PO), Take 1 tablet by mouth Daily., Disp: , Rfl:     Rivaroxaban (Xarelto Starter Pack) tablet therapy pack starter pack, Take one 15 mg tablet twice daily with food for 21 days.  Followed by one 20 mg tablet by mouth once daily with food. Take as directed, Disp: 51 each, Rfl: 0    rosuvastatin (CRESTOR) 20 MG tablet, Take 1 tablet by mouth Every Night., Disp: 90 tablet, Rfl: 3    sildenafil (REVATIO) 20 MG tablet, Take 1 tablet by mouth As Needed., Disp: , Rfl:     tamsulosin (FLOMAX) 0.4 MG capsule 24 hr capsule, Take 1 capsule by mouth Daily., Disp: , Rfl:     zolpidem (AMBIEN) 5 MG tablet, Take 1 tablet by mouth At Night As Needed., Disp: , Rfl:     aspirin 81 MG EC tablet, Take 1 tablet by mouth Daily., Disp: , Rfl:      ALLERGIES:  No Known Allergies     Objective   VITAL SIGNS:  Vitals:    11/15/23 1001   BP: 116/73   Pulse: 57   Resp: 16   Temp: 97.7 °F (36.5 °C)   TempSrc: Temporal   SpO2: 98%   Weight: 93.8 kg (206 lb 11.2 oz)   Height: 180 cm (70.87\")  Comment: new ht   PainSc: 0-No pain     Wt Readings from Last 3 Encounters:   11/15/23 93.8 kg (206 lb 11.2 oz)   11/09/23 93.4 kg (206 lb)   11/07/23 93.4 kg (206 lb)     PHYSICAL " EXAMINATION  GENERAL:  The patient appears in good general condition, not in acute distress.  SKIN: No skin rashes. No ecchymosis.  HEAD:  Normocephalic.  EYES:  No Jaundice. No Pallor. Pupils equal. EOMI.  NECK:  Supple with Good ROM. No Masses.  CHEST: Normal respiratory effort. Lungs clear to auscultation.   CARDIAC:  Normal S1 & S2. No murmur.   ABDOMEN:  Soft. No tenderness. No Hepatomegaly. No Splenomegaly. No masses.  EXTREMITIES:  No clubbing. No noted deformity. No Calf tenderness.  NEUROLOGICAL:  No Focal neurological deficits.     RESULT REVIEW:   Results from last 7 days   Lab Units 11/15/23  0952   WBC 10*3/mm3 5.46   NEUTROS ABS 10*3/mm3 3.11   HEMOGLOBIN g/dL 12.8*   HEMATOCRIT % 37.6   PLATELETS 10*3/mm3 175           Lab Results   Component Value Date    [id number] 683 11/16/2020     CT angiogram chest on 10/9/2023:  Mild aneurysmal dilatation of the ascending thoracic aorta measuring up  to 4.6 cm at the level of the MPA. No dissection. Aortic atherosclerotic  changes with mild stenosis (less than 50% origin of the left subclavian  artery. Cardiomegaly with mild four-chamber enlargement. Moderate  coronary artery calcium burden. Small pericardial effusion. Moderate  dilatation of the main pulmonary arteries right greater than left with  the right main pulmonary artery measuring up to 3.4 cm. Small occlusive  embolus within a proximal third order branch vessel of the left lower  lobe pulmonary artery. No other discernible emboli. Elevated right  ventricular to left ventricular ratio but not appreciably changed.     No focal infiltrates or effusions. Trachea and bronchi unremarkable. No  suspicious pulmonary nodules. No significant mediastinal or hilar  lymphadenopathy. Extensive cholelithiasis without complication. 2.7 x  3.2 cm cystic-appearing lesion off the anterior body of the pancreas.  This measures slightly larger when compared to October 2022. The lesion  appears unilocular and does not  appear to enhance. Differential would  include both benign as well as malignant pancreatic neoplasms.     IMPRESSION:  Mild aneurysmal dilatation of the ascending thoracic aorta  measuring up to 4.6 cm and has shown minimal increased from 2019 (4.4  cm).     Isolated small occlusive embolus proximal third order branch of the left  lower lobe pulmonary artery. No other discernible emboli. Review of  prior study in 2019 may have demonstrated a right lower lobe embolus and  this may suggest chronic recurrent pulmonary emboli. Further evaluation  may be warranted.     Stable cardiomegaly with continued elevated right ventricular left  ventricular ratio not appreciably changed and given the small volume of  embolus unlikely to represent RV strain. No secondary signs of right  ventricular failure.     2.7 x 3.2 cm cystic-appearing lesion anterior body of the pancreas. No  ancillary findings to suggest prior pancreatitis and differential would  include benign as well as mucinous cystic neoplasm of the pancreas. When  compared to prior report dating back to 2019 this has shown some  interval growth. If not previously evaluated further work-up with MRI of  the pancreas may be of benefit for better characterization.    Venous Doppler bilateral lower extremities on 10/30/2023:  Negative examination.  No evidence of lower extremity DVT on the right  or left.    Assessment & Plan   *Acute subsegmental pulmonary embolism involving the left lower lobe pulmonary artery.  It was identified incidentally on CT angiogram on 10/9/2023 (CT obtained for f/u on aortic aneurysm).  Patient did not have chest pain or shortness of breath at the time of diagnosis.  The radiologist reported that there was a right lower lobe embolus on review of prior CT in 2019.    This is concerning for chronic recurrent pulmonary embolism.  Venous Doppler of the lower extremities on 10/30/2023 showed no evidence of DVT.  Patient traveled in August 2023 to  [place] driving 1200 miles over 2 days and returning back 7 days later.  Patient retired in July 2023.  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  Family history is negative for DVT/PE.  He does not have symptoms of underlying autoimmune disease like systemic lupus erythematosus.  Patient was started on Xarelto on 10/10/2023.  He is tolerating it except for rectal bleeding as below.  The bleeding subsided after the dose of Xarelto was reduced to 20 mg daily.  I recommended obtaining thrombophilia work-up.  With the concern regarding recurrent pulmonary embolism, patient would likely need chronic anticoagulation.    *2.7 x 3.2 cm cystic appearing lesion in the anterior body of the pancreas.  This was reported to be slightly larger when compared to October 2022.  He was referred to GI.    *Normochromic normocytic anemia.  Hemoglobin decreased to 11.3 on 11/1/2023.  11/15/2023: Hemoglobin 12.8.  He reports lightheadedness and dizziness which improved to some degree after reduction in the doses of his antihypertensives (after intentional weight loss).    *Blood per rectum.  Patient reported developing blood per rectum when he was taking the starter pack Xarelto which subsided after he started taking the 20 mg/day dose.  Based on this, he would likely need EGD and colonoscopy to further evaluate the source of bleeding.    PLAN:    1.  Obtain thrombophilia work-up today.  2.  Obtain ferritin iron panel vitamin B12 folate and methylmalonic acid levels.  We will contact him with the results and start him on vitamin replacement accordingly.  3.  Patient is cleared to have Xarelto interrupted to allow for EGD and colonoscopy.  I asked him to get the clearance from cardiology as well.  4.  Follow-up in 6 weeks.  Will obtain CBC CMP.  5.  I explained to the patient that with recurrent pulmonary embolism, chronic anticoagulation is likely going to be needed to decrease his risk of developing  recurrent pulmonary embolism since aspirin was not sufficient.    I spent 70 minutes caring for Dustin on this date of service. This time includes time spent by me in the following activities: preparing for the visit, reviewing tests, obtaining and/or reviewing a separately obtained history, performing a medically appropriate examination and/or evaluation, counseling and educating the patient/family/caregiver, ordering medications, tests, or procedures, documenting information in the medical record, and independently interpreting results and communicating that information with the patient/family/caregiver     Paula Marte MD  11/15/23

## 2023-11-16 ENCOUNTER — TELEPHONE (OUTPATIENT)
Dept: ONCOLOGY | Facility: CLINIC | Age: 73
End: 2023-11-16
Payer: MEDICARE

## 2023-11-16 ENCOUNTER — PATIENT ROUNDING (BHMG ONLY) (OUTPATIENT)
Dept: ONCOLOGY | Facility: CLINIC | Age: 73
End: 2023-11-16
Payer: MEDICARE

## 2023-11-16 LAB
CARDIOLIPIN IGA SER IA-ACNC: <9 APL U/ML (ref 0–11)
CARDIOLIPIN IGG SER IA-ACNC: <9 GPL U/ML (ref 0–14)
CARDIOLIPIN IGM SER IA-ACNC: <9 MPL U/ML (ref 0–12)

## 2023-11-16 NOTE — TELEPHONE ENCOUNTER
----- Message from Paula Marte MD sent at 11/16/2023  7:21 AM EST -----  Please inform the patient that the lab tests showed iron deficiency. I recommend starting Ferrous sulfate 325 mg a day.     Thank you.

## 2023-11-17 ENCOUNTER — OFFICE VISIT (OUTPATIENT)
Dept: GASTROENTEROLOGY | Facility: CLINIC | Age: 73
End: 2023-11-17
Payer: MEDICARE

## 2023-11-17 ENCOUNTER — TELEPHONE (OUTPATIENT)
Dept: GASTROENTEROLOGY | Facility: CLINIC | Age: 73
End: 2023-11-17
Payer: MEDICARE

## 2023-11-17 VITALS
OXYGEN SATURATION: 96 % | DIASTOLIC BLOOD PRESSURE: 73 MMHG | TEMPERATURE: 97.3 F | WEIGHT: 208.6 LBS | SYSTOLIC BLOOD PRESSURE: 116 MMHG | HEART RATE: 60 BPM | HEIGHT: 72 IN | BODY MASS INDEX: 28.25 KG/M2

## 2023-11-17 DIAGNOSIS — K86.2 PANCREATIC CYST: ICD-10-CM

## 2023-11-17 DIAGNOSIS — K92.1 MELENA: Primary | ICD-10-CM

## 2023-11-17 DIAGNOSIS — D50.0 IRON DEFICIENCY ANEMIA DUE TO CHRONIC BLOOD LOSS: ICD-10-CM

## 2023-11-17 LAB
AT III PPP CHRO-ACNC: 115 % (ref 90–134)
F5 GENE MUT ANL BLD/T: NORMAL
FACTOR II, DNA ANALYSIS: NORMAL
PROT C ACT/NOR PPP: 83 % (ref 86–163)
PROT C AG ACT/NOR PPP IA: 88 % (ref 60–150)
PROT S ACT/NOR PPP: 95 % (ref 70–127)
PROT S AG ACT/NOR PPP IA: 77 % (ref 60–150)
PROT S FREE AG ACT/NOR PPP IA: 102 % (ref 61–136)

## 2023-11-17 RX ORDER — FERROUS SULFATE 325(65) MG
325 TABLET ORAL
Qty: 90 TABLET | Refills: 1 | Status: SHIPPED | OUTPATIENT
Start: 2023-11-17

## 2023-11-17 NOTE — Clinical Note
Dr. Lee placed a request for this patient to get referred to Dr. Rios for his pancreatic cyst. Not sure if he sent this to you all.

## 2023-11-17 NOTE — TELEPHONE ENCOUNTER
Reviewed Dr. Marte's note and instructions with pt, he v/u. The prescription will be sent to his pharmacy.

## 2023-11-17 NOTE — H&P (VIEW-ONLY)
"Chief Complaint  Black or Bloody Stool    Subjective          History Of Present Illness:    Dustin Wilkerson is a  73 y.o. male patient of Dr. Lee with a history of a pancreatic cyst and GERD.    Patient had a EUS in the past and was found to have a benign cystic lesion.  Patient has had ongoing monitoring and noted progressive enlargement and most recent imaging.  During his last office visit with Dr. Lee it was recommended that he go and see Dr. Rios and a referral was placed.    Patient reports that he forgot to mention on his last office visit that he has been experiencing black stools on and off. Patient was recently started on xarelto for a pulmonary embolism and has began experiencing intermittent black stools.  Most recent labs with Hgb 12, Iron sat 19, normal iron levels - currently on ferrous sulfate 325 mg.  Last colonoscopy was in 2016. No polyps on his last one but has a history of them.  Patient currently denies abdominal pain, chest pain, reflux, dysphagia, constipation, diarrhea.  He denies any bright red blood per rectum. No unintentional weight loss and appetite is appropriate.     Patient's Xarelto is managed by cardiology and he sees Dr. Saleh and MAYNOR Perez.    Objective   Vital Signs:   /73   Pulse 60   Temp 97.3 °F (36.3 °C)   Ht 182.9 cm (72\")   Wt 94.6 kg (208 lb 9.6 oz)   SpO2 96%   BMI 28.29 kg/m²       Physical Exam  Vitals reviewed.   Constitutional:       General: He is not in acute distress.     Appearance: Normal appearance. He is not ill-appearing.   HENT:      Head: Normocephalic and atraumatic.      Nose: Nose normal.      Mouth/Throat:      Pharynx: Oropharynx is clear.   Eyes:      Extraocular Movements: Extraocular movements intact.      Conjunctiva/sclera: Conjunctivae normal.      Pupils: Pupils are equal, round, and reactive to light.   Cardiovascular:      Rate and Rhythm: Normal rate.      Heart sounds: Normal heart sounds. "   Pulmonary:      Effort: Pulmonary effort is normal.      Breath sounds: Normal breath sounds.   Abdominal:      General: Abdomen is flat. Bowel sounds are normal. There is no distension.      Palpations: Abdomen is soft. There is no mass.      Tenderness: There is no abdominal tenderness.   Musculoskeletal:         General: No swelling. Normal range of motion.      Cervical back: Normal range of motion.   Skin:     General: Skin is warm and dry.      Findings: No bruising or rash.   Neurological:      General: No focal deficit present.      Mental Status: He is alert and oriented to person, place, and time.      Motor: No weakness.      Gait: Gait normal.   Psychiatric:         Mood and Affect: Mood normal.          Result Review :   The following data was reviewed by: Liz Mensah PA-C on 11/17/2023:    CBC          4/28/2023    09:43 11/1/2023    11:12 11/15/2023    09:52   CBC   WBC 6.31     4.25     5.46    RBC 4.81     3.67     4.22    Hemoglobin 14.2     11.3     12.8    Hematocrit 42.3     33.4     37.6    MCV 87.9     91.0     89.1    MCH 29.5     30.8     30.3    MCHC 33.6     33.8     34.0    RDW 13.1     13.6     13.8    Platelets 206     170     175       Details          This result is from an external source.                   Assessment and Plan    Diagnoses and all orders for this visit:    1. Melena (Primary)  -     Case Request; Standing  -     Implement Anesthesia orders day of procedure.; Standing  -     Obtain informed consent; Standing  -     Verify bowel prep was successful; Standing  -     Give tap water enema if bowel prep was insufficient; Standing  -     Case Request    2. Iron deficiency anemia due to chronic blood loss  -     Case Request; Standing  -     Implement Anesthesia orders day of procedure.; Standing  -     Obtain informed consent; Standing  -     Verify bowel prep was successful; Standing  -     Give tap water enema if bowel prep was insufficient; Standing  -      Case Request    3. Pancreatic cyst       Proceed with EGD and colonoscopy in the setting of iron deficiency anemia and melena.  Last colonoscopy was over 5 years ago and he has never had an EGD.  We will need to hold Xarelto approximately 2 days prior to procedures.  We will reach out to Dr. Saleh's team to ensure he is able to hold his Xarelto as his pulmonary embolism was recent.  Continue to monitor for signs and symptoms of bleeding.  Patient is currently on iron supplementation and is being followed by hematology.  Patient has not heard from Dr. Rios's office to schedule an appointment for his pancreatic cyst. We will have the nurses reach out to his office to schedule an appointment.    Follow Up   Return for EGD/Colonoscopy.    Dragon dictation used throughout this note.            Liz Leblanc PA-C   [facility]  [address] [place] [address]  Office: [phone]

## 2023-11-17 NOTE — TELEPHONE ENCOUNTER
Last OV 10/30/23  Next OV 12/4/23  Labs 11/15/23    Does not meet protocol  Please advise    [facility]

## 2023-11-17 NOTE — Clinical Note
Please reach out to Dr. Saleh's office (he also sees Nohemi Perez) to see if it is okay if he hold his Xarelto for 2 days prior to his EGD and colonoscopy?

## 2023-11-17 NOTE — TELEPHONE ENCOUNTER
----- Message from Liz Leblanc PA-C sent at 11/17/2023  9:42 AM EST -----  Please reach out to Dr. Saleh's office (he also sees Nohemi Perez) to see if it is okay if he hold his Xarelto for 2 days prior to his EGD and colonoscopy?

## 2023-11-17 NOTE — PROGRESS NOTES
"Chief Complaint  Black or Bloody Stool    Subjective          History Of Present Illness:    Dustin Wilkerson is a  73 y.o. male patient of Dr. Lee with a history of a pancreatic cyst and GERD.    Patient had a EUS in the past and was found to have a benign cystic lesion.  Patient has had ongoing monitoring and noted progressive enlargement and most recent imaging.  During his last office visit with Dr. Lee it was recommended that he go and see Dr. Rios and a referral was placed.    Patient reports that he forgot to mention on his last office visit that he has been experiencing black stools on and off. Patient was recently started on xarelto for a pulmonary embolism and has began experiencing intermittent black stools.  Most recent labs with Hgb 12, Iron sat 19, normal iron levels - currently on ferrous sulfate 325 mg.  Last colonoscopy was in 2016. No polyps on his last one but has a history of them.  Patient currently denies abdominal pain, chest pain, reflux, dysphagia, constipation, diarrhea.  He denies any bright red blood per rectum. No unintentional weight loss and appetite is appropriate.     Patient's Xarelto is managed by cardiology and he sees Dr. Saleh and MAYNOR Perez.    Objective   Vital Signs:   /73   Pulse 60   Temp 97.3 °F (36.3 °C)   Ht 182.9 cm (72\")   Wt 94.6 kg (208 lb 9.6 oz)   SpO2 96%   BMI 28.29 kg/m²       Physical Exam  Vitals reviewed.   Constitutional:       General: He is not in acute distress.     Appearance: Normal appearance. He is not ill-appearing.   HENT:      Head: Normocephalic and atraumatic.      Nose: Nose normal.      Mouth/Throat:      Pharynx: Oropharynx is clear.   Eyes:      Extraocular Movements: Extraocular movements intact.      Conjunctiva/sclera: Conjunctivae normal.      Pupils: Pupils are equal, round, and reactive to light.   Cardiovascular:      Rate and Rhythm: Normal rate.      Heart sounds: Normal heart sounds. "   Pulmonary:      Effort: Pulmonary effort is normal.      Breath sounds: Normal breath sounds.   Abdominal:      General: Abdomen is flat. Bowel sounds are normal. There is no distension.      Palpations: Abdomen is soft. There is no mass.      Tenderness: There is no abdominal tenderness.   Musculoskeletal:         General: No swelling. Normal range of motion.      Cervical back: Normal range of motion.   Skin:     General: Skin is warm and dry.      Findings: No bruising or rash.   Neurological:      General: No focal deficit present.      Mental Status: He is alert and oriented to person, place, and time.      Motor: No weakness.      Gait: Gait normal.   Psychiatric:         Mood and Affect: Mood normal.          Result Review :   The following data was reviewed by: Liz Mensah PA-C on 11/17/2023:    CBC          4/28/2023    09:43 11/1/2023    11:12 11/15/2023    09:52   CBC   WBC 6.31     4.25     5.46    RBC 4.81     3.67     4.22    Hemoglobin 14.2     11.3     12.8    Hematocrit 42.3     33.4     37.6    MCV 87.9     91.0     89.1    MCH 29.5     30.8     30.3    MCHC 33.6     33.8     34.0    RDW 13.1     13.6     13.8    Platelets 206     170     175       Details          This result is from an external source.                   Assessment and Plan    Diagnoses and all orders for this visit:    1. Melena (Primary)  -     Case Request; Standing  -     Implement Anesthesia orders day of procedure.; Standing  -     Obtain informed consent; Standing  -     Verify bowel prep was successful; Standing  -     Give tap water enema if bowel prep was insufficient; Standing  -     Case Request    2. Iron deficiency anemia due to chronic blood loss  -     Case Request; Standing  -     Implement Anesthesia orders day of procedure.; Standing  -     Obtain informed consent; Standing  -     Verify bowel prep was successful; Standing  -     Give tap water enema if bowel prep was insufficient; Standing  -      Case Request    3. Pancreatic cyst       Proceed with EGD and colonoscopy in the setting of iron deficiency anemia and melena.  Last colonoscopy was over 5 years ago and he has never had an EGD.  We will need to hold Xarelto approximately 2 days prior to procedures.  We will reach out to Dr. Saleh's team to ensure he is able to hold his Xarelto as his pulmonary embolism was recent.  Continue to monitor for signs and symptoms of bleeding.  Patient is currently on iron supplementation and is being followed by hematology.  Patient has not heard from Dr. Rios's office to schedule an appointment for his pancreatic cyst. We will have the nurses reach out to his office to schedule an appointment.    Follow Up   Return for EGD/Colonoscopy.    Dragon dictation used throughout this note.            Liz Leblanc PA-C   [facility]  [address] [place] [address]  Office: [phone]

## 2023-11-18 LAB
B2 GLYCOPROT1 IGA SER-ACNC: <9 GPI IGA UNITS (ref 0–25)
B2 GLYCOPROT1 IGG SER-ACNC: <9 GPI IGG UNITS (ref 0–20)
B2 GLYCOPROT1 IGM SER-ACNC: <9 GPI IGM UNITS (ref 0–32)

## 2023-11-20 ENCOUNTER — TELEPHONE (OUTPATIENT)
Dept: GASTROENTEROLOGY | Facility: CLINIC | Age: 73
End: 2023-11-20
Payer: MEDICARE

## 2023-11-20 LAB — METHYLMALONATE SERPL-SCNC: 171 NMOL/L (ref 0–378)

## 2023-11-20 RX ORDER — RIVAROXABAN 15 MG-20MG
KIT ORAL
Qty: 51 TABLET | OUTPATIENT
Start: 2023-11-20

## 2023-11-20 NOTE — TELEPHONE ENCOUNTER
----- Message from Liz Leblanc PA-C sent at 11/17/2023  9:36 AM EST -----  Dr. Lee placed a request for this patient to get referred to Dr. Rios for his pancreatic cyst. Not sure if he sent this to you all.

## 2023-11-29 ENCOUNTER — TELEPHONE (OUTPATIENT)
Dept: GASTROENTEROLOGY | Facility: CLINIC | Age: 73
End: 2023-11-29
Payer: MEDICARE

## 2023-11-29 NOTE — TELEPHONE ENCOUNTER
Ok fo for procedure and okay to hold Xarelto for 2 days prior.  Restart Xarelto after the procedure.

## 2023-11-29 NOTE — TELEPHONE ENCOUNTER
----- Message from Evelyn Gleason RN sent at 11/29/2023 10:54 AM EST -----    ----- Message -----  From: Liz Leblanc PA-C  Sent: 11/17/2023   9:42 AM EST  To: [place] [id number] Pool    Please reach out to Dr. Saleh's office (he also sees Nohemi Perez) to see if it is okay if he hold his Xarelto for 2 days prior to his EGD and colonoscopy?

## 2023-11-29 NOTE — TELEPHONE ENCOUNTER
Call to patient and left voice mail for them to call office at [phone] option 1 to go over recommendations

## 2023-12-04 ENCOUNTER — OFFICE VISIT (OUTPATIENT)
Dept: CARDIOLOGY | Facility: CLINIC | Age: 73
End: 2023-12-04
Payer: MEDICARE

## 2023-12-04 ENCOUNTER — TELEPHONE (OUTPATIENT)
Dept: CARDIOLOGY | Facility: CLINIC | Age: 73
End: 2023-12-04
Payer: MEDICARE

## 2023-12-04 VITALS
DIASTOLIC BLOOD PRESSURE: 82 MMHG | WEIGHT: 205 LBS | HEIGHT: 72 IN | SYSTOLIC BLOOD PRESSURE: 146 MMHG | OXYGEN SATURATION: 96 % | BODY MASS INDEX: 27.77 KG/M2 | HEART RATE: 55 BPM

## 2023-12-04 DIAGNOSIS — I65.23 BILATERAL CAROTID ARTERY STENOSIS: ICD-10-CM

## 2023-12-04 DIAGNOSIS — I25.84 CORONARY ARTERY CALCIFICATION: Primary | ICD-10-CM

## 2023-12-04 DIAGNOSIS — I77.810 ASCENDING AORTA DILATATION: ICD-10-CM

## 2023-12-04 DIAGNOSIS — I26.93 SINGLE SUBSEGMENTAL PULMONARY EMBOLISM WITHOUT ACUTE COR PULMONALE: ICD-10-CM

## 2023-12-04 DIAGNOSIS — I10 ESSENTIAL HYPERTENSION: ICD-10-CM

## 2023-12-04 DIAGNOSIS — N18.31 STAGE 3A CHRONIC KIDNEY DISEASE: ICD-10-CM

## 2023-12-04 DIAGNOSIS — I63.312 CEREBRAL INFARCTION DUE TO THROMBOSIS OF LEFT MIDDLE CEREBRAL ARTERY: ICD-10-CM

## 2023-12-04 DIAGNOSIS — I25.10 CORONARY ARTERY CALCIFICATION: Primary | ICD-10-CM

## 2023-12-04 RX ORDER — AMLODIPINE BESYLATE 2.5 MG/1
2.5 TABLET ORAL DAILY
Start: 2023-12-04

## 2023-12-04 NOTE — PROGRESS NOTES
[facility] CARDIOLOGY  [address]  [address]  [place] [address]  Phone: [phone]  Fax: [phone]  Patient Name: Dustin Wilkerson  :1950  Age: 73 y.o.  Primary Cardiologist: Aliza Saleh MD  Encounter Provider:  MAYNOR Peng    History of Present Illness     Dustin Wilkerson is a 73 y.o.  male whose medical history includes hypertension, hyperlipidemia, CKD 3, bilateral carotid artery stenosis, recurrent falls, history of left corona radiata lacunar infarct, monoclonal gammopathy, pulmonary nodules.  He is followed in our office by Dr. Saleh for ascending aorta dilation, coronary artery calcification, and history of near syncope. I have reviewed the past medical records in preparation of today's visit.     23 Follow-up:  He is here for 2-month follow-up.  Since last visit he has seen Dr. Rios at [facility] surgical oncology who feels his pancreatic cyst is stable but FNA is planned.  He also reported stool and his blood after starting Xarelto but improved when dose was reduced to 20 mg daily.  He has been evaluated by Dr. Marte; workup is ongoing but negative to date.  He has been started on oral iron and is planning an EGD and colonoscopy at [facility].  Dr. Rios is trying to get him set up for FNA of the pancreatic cyst in December.  He noticed around the time that he started oral iron though it was hard on the stomach, his dizziness got better and his blood pressure started to run higher.  It has been running 120s to 150s at home.  He turned in his Zio patch monitor last week; he did have a few episodes of heart racing in bed and his Apple Watch indicated it could be A-fib.  This lasted a few days but is now gone.  He denies chest pain, shortness of breath, or leg swelling.  He is taking his medications as prescribed.    Data Review     The following data was reviewed by MAYNOR Peng on  "12/04/23:    Vital Signs:   /82 (BP Location: Left arm, Patient Position: Sitting, Cuff Size: Adult)   Pulse 55   Ht 182.9 cm (72\")   Wt 93 kg (205 lb)   SpO2 96%   BMI 27.80 kg/m²       Weight:  Wt Readings from Last 3 Encounters:   12/04/23 93 kg (205 lb)   11/17/23 94.6 kg (208 lb 9.6 oz)   11/15/23 93.8 kg (206 lb 11.2 oz)     Body mass index is 27.8 kg/m².    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  He has 1 caffeinated beverage daily and 1 bottle of water daily.  He does not use alcohol or smoke.  There is no history of premature cardiovascular disease in his family.  His mom had history of strokes.  2015 MRI of the head for hearing loss showed an old, chronic infarct in the left espinoza radiata.  Dr. Dimas Richmond felt this was due to to hypertension.  November 2016 he was walking down his yard, bent over to turn the hose off and fell.  He is not sure if he passed out but he did awake with right arm injury which was moderately severe.  He did not have any warning or preceding symptoms.  December 2016 he walked out on his porch late at night and then the next thing he knew he awoke on the ground.  Again he had no preceding symptoms or warning.  He sustained a severe right leg injury at that time and required multiple surgeries to repair these quadriceps of his right leg.  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  Echocardiogram at that time showed EF 65%, mild dilation of the aortic root.  CTA at that time showed a dilated aortic root, tubular ascending aorta measuring 4.7 cm and 4.5 cm respectively.  There was also a 2.2 cm cystic lesion of the tail of the pancreas.  September 2021 CTA chest showed the aortic root to measure 4.4 cm, the ascending aorta " measured 4.3 cm which was stable compared to CT done in June 2019.  Dr. Saleh reviewed the CT images and saw significant calcification of the proximal LAD and scattered calcification throughout the RCA.  October 2021 treadmill stress study showed no evidence of ischemia; he exercised for 7-1/2 minutes on a Beka protocol without difficulty and had normal BP response.  May 2022 carotid artery duplex showed mild left internal carotid artery stenosis and plaquing of the right internal carotid artery.  October 2022 CTA chest showed the aortic root to measure 4.7 cm which was up from 4.6 in May 2022.  Again the images were reviewed by Dr. Saleh who noted scattered calcification of the LAD but was otherwise patent, patent proximal left circumflex, patent first diagonal branch, patent left main coronary artery, and patent RCA but with calcification in the proximal vessel.  October 2023 CTA chest showed mild dilation of the ascending thoracic aorta measuring up to 4.6 cm, mild stenosis of less than 50% of the origin of the left subclavian artery, and moderate coronary artery calcification burden, a small pericardial effusion, moderate dilation of the main pulmonary arteries right greater than left, a small occlusive embolus within a proximal third order branch of the lower left pulmonary artery but no other discernible embolus.  His RV to LV ratio was normal.,  Stable cystic lesion of the body of the pancreas.  October 2023 bilateral lower extremity venous Doppler study was negative for evidence of DVT.  November 2023 echocardiogram showed EF 59%, normal diastolic function, normal RVSP, aortic root measuring 3.9 cm, and mild dilation of the ascending aorta measuring 4.3 cm.  This is stable compared to previous.    Labs:  4/28/2023:  cr 1.2, K 4.7, otherwise unremarkable CMP, Chol 125, HDL 44, LDL 64, Trig 88, Hgb 14.2, Plt 206, HgbA1c 6.5, TSH 1.640  09/1/2023:  cr 1.5, K 5.1, otherwise unremarkable BMP, HgbA1c  5.9  11/15/2023: Iron 71, ferritin 41, TSAT 19, Hgb 12.8,       ECG 12 Lead    Date/Time: 12/4/2023 10:58 AM  Performed by: Miracle Perez APRN    Authorized by: Miracle Perez APRN  Comparison: compared with previous ECG from 10/30/2023  Rhythm: sinus rhythm  Rate: normal  BPM: 55  T inversion: III and V1          Medications     Allergies as of 12/04/2023    (No Known Allergies)       Current Outpatient Medications   Medication Instructions    allopurinol (ZYLOPRIM) 100 MG tablet 1 tablet, Oral, Daily    amLODIPine (NORVASC) 2.5 mg, Oral, Daily    benazepril (LOTENSIN) 40 MG tablet 1 tablet, Oral, Daily    ferrous sulfate 325 mg, Oral, Daily With Breakfast    levothyroxine (SYNTHROID, LEVOTHROID) 100 MCG tablet TAKE ONE TABLET BY MOUTH DAILY    metoprolol succinate XL (TOPROL-XL) 50 mg, Oral, 2 Times Daily    Multiple Vitamin (MULTI VITAMIN PO) 1 tablet, Oral, Daily    rivaroxaban (XARELTO) 20 mg, Oral, Daily    rosuvastatin (CRESTOR) 20 mg, Oral, Nightly    sildenafil (REVATIO) 20 mg, Oral, As Needed    tamsulosin (FLOMAX) 0.4 mg, Oral, Daily    zolpidem (AMBIEN) 5 MG tablet Take 1 tablet by mouth At Night As Needed.        Past History, Review of Systems, Exam     Past Medical History:   Diagnosis Date    Aneurysm 5 years ago    Aortic root dilation 03/30/2022    Ascending aorta dilatation 03/30/2022    Bilateral carotid artery stenosis     Coronary artery calcification 03/30/2022    Essential hypertension     Falls     GERD (gastroesophageal reflux disease)     Gout     Hearing loss     Hyperlipidemia     Hypertension     Lumbar spondylolysis     Neuropathy     Single subsegmental pulmonary embolism without acute cor pulmonale 10/30/2023    Snoring     Stage 3a chronic kidney disease 03/30/2022    Syncope        Past Surgical History:   has a past surgical history that includes Knee surgery (2016); Colonoscopy (2016); and Pancreas Biopsy.     Social History     Socioeconomic  History    Marital status:      Spouse name: Jess   Tobacco Use    Smoking status: Never    Smokeless tobacco: Never   Vaping Use    Vaping Use: Never used   Substance and Sexual Activity    Alcohol use: Never     Comment: caffeine use: None    Drug use: Never    Sexual activity: Yes     Partners: Female     Birth control/protection: None       Review of Systems   Cardiovascular:  Positive for palpitations. Negative for chest pain, claudication, cyanosis, dyspnea on exertion, irregular heartbeat, leg swelling, near-syncope, orthopnea, paroxysmal nocturnal dyspnea and syncope.   Neurological:  Negative for dizziness and paresthesias.       Vitals reviewed.   Constitutional:       Appearance: Not in distress.   Eyes:      Conjunctiva/sclera: Conjunctivae normal.      Pupils: Pupils are equal, round, and reactive to light.   HENT:      Head: Normocephalic.      Nose: Nose normal.    Mouth/Throat:      Pharynx: Oropharynx is clear.   Neck:      Vascular: JVD normal.   Pulmonary:      Effort: Pulmonary effort is normal.      Breath sounds: Normal breath sounds. No wheezing. No rhonchi. No rales.   Cardiovascular:      Normal rate. Regular rhythm. Normal S1. Normal S2.       Murmurs: There is no murmur.   Pulses:     Intact distal pulses.   Edema:     Peripheral edema absent.   Abdominal:      General: Bowel sounds are normal. There is no distension.      Palpations: Abdomen is soft.      Tenderness: There is no abdominal tenderness.   Musculoskeletal: Normal range of motion.      Cervical back: Normal range of motion and neck supple. Skin:     General: Skin is warm and dry.   Neurological:      Mental Status: Alert and oriented to person, place and time.   Psychiatric:         Attention and Perception: Attention normal.         Mood and Affect: Mood normal.         Speech: Speech normal.         Behavior: Behavior is cooperative.          Assessment and Plan     Assessment:  1. Coronary artery calcification    2.  Bilateral carotid artery stenosis    3. Ascending aorta dilatation    4. Essential hypertension    5. Single subsegmental pulmonary embolism without acute cor pulmonale    6. Stage 3a chronic kidney disease    7. Cerebral infarction due to thrombosis of left middle cerebral artery            Coronary artery calcification: This was noted on previous CTA chest.  October 2022 CTA chest showed scattered calcification of the LAD and of the proximal RCA; the other coronary arteries appear to be patent. He did have a treadmill stress test in October 2021 showing no evidence of ischemia.  His medical therapy includes 20 mg rosuvastatin; aspirin has been stopped due to bleeding but he is on Xarelto.  Bilateral carotid artery stenosis: Last imaging was May 2022 showing mild left internal carotid artery stenosis and plaquing of the right internal carotid artery.  He is on Xarelto and rosuvastatin.  Ascending aorta dilation: This has been followed since April 2017 and has remained stable.  CTA chest in October 2023 showed the ascending thoracic aorta measuring 4.6 cm.  He also has known dilation of the aortic root.  It was noted to measure 4.7 cm in October 2022.  It measured 4.3 on November 2023 echocardiogram.  Hypertension: BP high in office today; he feels it is getting better since he started oral iron.  Hyperlipidemia: Lipids at goal when checked in April 2023; he is on 20 mg rosuvastatin daily.  Will continue to follow.  Acute PE: October 2023 CTA chest did show a small occlusive embolus within a proximal third order branch of the left lower pulmonary artery; per radiology there was some concern that he had chronic PE.  His RV to LV ratio was normal.  He was started on Xarelto; evaluation with Dr. Marte is ongoing.  CKD 3A: Renal functions been stable in 2023.  History of CVA: He had an MRI hea in 2015 for hearing loss which showed an old, chronic infarct in the left corona radiata.  He is on Xarelto and  rosuvastatin.  Pancreatic cyst: This was previously noted on imaging and was stable but there was evidence of possible increase in size on October 2023 CTA chest; he is to have FNA biopsy with Dr. Rios.  Near syncope: He has had some melena since starting Xarelto which has been intermittent.  He feels since starting oral iron this is better.  He has scheduled EGD and colonoscopy upcoming.    Mr. Wilkerson is a patient of Dr. Saleh's with history of recurrent syncope, ascending aorta dilation and dilated aortic root which has been stable, and history of old stroke.  His most recent CTA chest in October 2023 showed a small pulmonary embolus and he was started on Xarelto.  His blood pressure is elevated today so I am going to start back 2.5 mg amlodipine daily with low threshold to increase back to 5 mg.    He is going to [place] in April and would like follow-up in March; I will see if Dr. Saleh can see him at that time.    Return for Dr. Slaeh in March 2024.  Orders Placed This Encounter   Procedures    ECG 12 Lead      New Medications Ordered This Visit   Medications    amLODIPine (NORVASC) 2.5 MG tablet     Sig: Take 1 tablet by mouth Daily.    rivaroxaban (XARELTO) 20 MG tablet     Sig: Take 1 tablet by mouth Daily.     Dispense:  90 tablet     Refill:  3         Thank you for the opportunity to participate in this patient's care.    MAYNOR Meneses    This office note has been dictated.

## 2023-12-04 NOTE — Clinical Note
Mr. Wilkerson is going to [place] in April and would like to be seen by Dr. Saleh if possible in March; could this be arranged?

## 2023-12-04 NOTE — TELEPHONE ENCOUNTER
JORDAN: Please add this patient on to  CEC schedule on 3/21/24 at 1130.  Note: follow up per YA Whitten

## 2023-12-08 ENCOUNTER — TELEPHONE (OUTPATIENT)
Dept: CARDIOLOGY | Facility: CLINIC | Age: 73
End: 2023-12-08
Payer: MEDICARE

## 2023-12-08 NOTE — TELEPHONE ENCOUNTER
I left message about his results.     No changes unless he feels his heart racing or skipping beats again.     TMM

## 2023-12-11 ENCOUNTER — ANESTHESIA EVENT (OUTPATIENT)
Dept: GASTROENTEROLOGY | Facility: HOSPITAL | Age: 73
End: 2023-12-11
Payer: MEDICARE

## 2023-12-11 ENCOUNTER — HOSPITAL ENCOUNTER (OUTPATIENT)
Facility: HOSPITAL | Age: 73
Setting detail: HOSPITAL OUTPATIENT SURGERY
Discharge: HOME | End: 2023-12-11
Attending: INTERNAL MEDICINE | Admitting: INTERNAL MEDICINE
Payer: MEDICARE

## 2023-12-11 ENCOUNTER — ANESTHESIA (OUTPATIENT)
Dept: GASTROENTEROLOGY | Facility: HOSPITAL | Age: 73
End: 2023-12-11
Payer: MEDICARE

## 2023-12-11 VITALS
HEIGHT: 72 IN | HEART RATE: 69 BPM | OXYGEN SATURATION: 96 % | SYSTOLIC BLOOD PRESSURE: 107 MMHG | RESPIRATION RATE: 16 BRPM | WEIGHT: 202.1 LBS | DIASTOLIC BLOOD PRESSURE: 83 MMHG | BODY MASS INDEX: 27.37 KG/M2

## 2023-12-11 DIAGNOSIS — K92.1 MELENA: ICD-10-CM

## 2023-12-11 DIAGNOSIS — D50.0 IRON DEFICIENCY ANEMIA DUE TO CHRONIC BLOOD LOSS: ICD-10-CM

## 2023-12-11 LAB — GLUCOSE BLDC GLUCOMTR-MCNC: 94 MG/DL (ref 70–130)

## 2023-12-11 PROCEDURE — 25010000002 PHENYLEPHRINE 10 MG/ML SOLUTION: Performed by: ANESTHESIOLOGY

## 2023-12-11 PROCEDURE — 25010000002 PROPOFOL 10 MG/ML EMULSION: Performed by: ANESTHESIOLOGY

## 2023-12-11 PROCEDURE — 82948 REAGENT STRIP/BLOOD GLUCOSE: CPT

## 2023-12-11 PROCEDURE — 25810000003 LACTATED RINGERS PER 1000 ML: Performed by: ANESTHESIOLOGY

## 2023-12-11 PROCEDURE — 25810000003 LACTATED RINGERS PER 1000 ML: Performed by: INTERNAL MEDICINE

## 2023-12-11 RX ORDER — SODIUM CHLORIDE, SODIUM LACTATE, POTASSIUM CHLORIDE, CALCIUM CHLORIDE 600; 310; 30; 20 MG/100ML; MG/100ML; MG/100ML; MG/100ML
INJECTION, SOLUTION INTRAVENOUS CONTINUOUS PRN
Status: DISCONTINUED | OUTPATIENT
Start: 2023-12-11 | End: 2023-12-11 | Stop reason: SURG

## 2023-12-11 RX ORDER — PROPOFOL 10 MG/ML
VIAL (ML) INTRAVENOUS AS NEEDED
Status: DISCONTINUED | OUTPATIENT
Start: 2023-12-11 | End: 2023-12-11 | Stop reason: SURG

## 2023-12-11 RX ORDER — LIDOCAINE HYDROCHLORIDE 20 MG/ML
INJECTION, SOLUTION INFILTRATION; PERINEURAL AS NEEDED
Status: DISCONTINUED | OUTPATIENT
Start: 2023-12-11 | End: 2023-12-11 | Stop reason: SURG

## 2023-12-11 RX ORDER — SODIUM CHLORIDE, SODIUM LACTATE, POTASSIUM CHLORIDE, CALCIUM CHLORIDE 600; 310; 30; 20 MG/100ML; MG/100ML; MG/100ML; MG/100ML
1000 INJECTION, SOLUTION INTRAVENOUS CONTINUOUS
Status: DISCONTINUED | OUTPATIENT
Start: 2023-12-11 | End: 2023-12-11 | Stop reason: HOSPADM

## 2023-12-11 RX ORDER — PANTOPRAZOLE SODIUM 40 MG/1
40 TABLET, DELAYED RELEASE ORAL DAILY
Qty: 90 TABLET | Refills: 3 | Status: SHIPPED | OUTPATIENT
Start: 2023-12-11

## 2023-12-11 RX ORDER — PHENYLEPHRINE HYDROCHLORIDE 10 MG/ML
INJECTION INTRAVENOUS AS NEEDED
Status: DISCONTINUED | OUTPATIENT
Start: 2023-12-11 | End: 2023-12-11 | Stop reason: SURG

## 2023-12-11 RX ORDER — GLYCOPYRROLATE 0.2 MG/ML
INJECTION INTRAMUSCULAR; INTRAVENOUS AS NEEDED
Status: DISCONTINUED | OUTPATIENT
Start: 2023-12-11 | End: 2023-12-11 | Stop reason: SURG

## 2023-12-11 RX ADMIN — PROPOFOL 120 MG: 10 INJECTION, EMULSION INTRAVENOUS at 15:14

## 2023-12-11 RX ADMIN — GLYCOPYRROLATE 0.2 MG: 0.2 INJECTION INTRAMUSCULAR; INTRAVENOUS at 15:24

## 2023-12-11 RX ADMIN — PHENYLEPHRINE HYDROCHLORIDE 100 MCG: 10 INJECTION INTRAVENOUS at 15:38

## 2023-12-11 RX ADMIN — PROPOFOL 180 MCG/KG/MIN: 10 INJECTION, EMULSION INTRAVENOUS at 15:14

## 2023-12-11 RX ADMIN — PHENYLEPHRINE HYDROCHLORIDE 100 MCG: 10 INJECTION INTRAVENOUS at 15:26

## 2023-12-11 RX ADMIN — LIDOCAINE HYDROCHLORIDE 60 MG: 20 INJECTION, SOLUTION INFILTRATION; PERINEURAL at 15:14

## 2023-12-11 RX ADMIN — SODIUM CHLORIDE, POTASSIUM CHLORIDE, SODIUM LACTATE AND CALCIUM CHLORIDE: 600; 310; 30; 20 INJECTION, SOLUTION INTRAVENOUS at 15:08

## 2023-12-11 RX ADMIN — SODIUM CHLORIDE, POTASSIUM CHLORIDE, SODIUM LACTATE AND CALCIUM CHLORIDE 1000 ML: 600; 310; 30; 20 INJECTION, SOLUTION INTRAVENOUS at 14:51

## 2023-12-11 RX ADMIN — GLYCOPYRROLATE 0.2 MG: 0.2 INJECTION INTRAMUSCULAR; INTRAVENOUS at 15:14

## 2023-12-11 NOTE — ANESTHESIA POSTPROCEDURE EVALUATION
Patient: Dustin Wilkerson    Procedure Summary       Date: 12/11/23 Room / Location:  [facility]    Anesthesia Start: 1508 Anesthesia Stop: 1544    Procedures:       COLONOSCOPY to cecum ti      ESOPHAGOGASTRODUODENOSCOPY (Esophagus) Diagnosis:       Melena      Iron deficiency anemia due to chronic blood loss      AVM (arteriovenous malformation) of colon      Internal hemorrhoids      Hiatal hernia      Schatzki's ring      (Melena [K92.1])      (Iron deficiency anemia due to chronic blood loss [D50.0])    Surgeons: Joe Lee MD Provider: Fox Suggs MD    Anesthesia Type: MAC ASA Status: 3            Anesthesia Type: MAC    Vitals  Vitals Value Taken Time   /83 12/11/23 1604   Temp     Pulse 65 12/11/23 1618   Resp 16 12/11/23 1604   SpO2 93 % 12/11/23 1618   Vitals shown include unfiled device data.        Post Anesthesia Care and Evaluation    Patient location during evaluation: PHASE II  Patient participation: complete - patient participated  Level of consciousness: awake and alert  Pain management: adequate    Airway patency: patent  Anesthetic complications: No anesthetic complications  PONV Status: none  Cardiovascular status: acceptable and hemodynamically stable  Respiratory status: acceptable, nonlabored ventilation and spontaneous ventilation  Hydration status: acceptable

## 2023-12-11 NOTE — BRIEF OP NOTE
COLONOSCOPY, ESOPHAGOGASTRODUODENOSCOPY  Progress Note    Dustin Wilkerson  12/11/2023    Pre-op Diagnosis:   Melena [K92.1]  Iron deficiency anemia due to chronic blood loss [D50.0]       Post-Op Diagnosis Codes:     * Melena [K92.1]     * Iron deficiency anemia due to chronic blood loss [D50.0]     * AVM (arteriovenous malformation) of colon [K55.20]     * Internal hemorrhoids [K64.8]     * Hiatal hernia [K44.9]     * Schatzki's ring [K22.2]    Procedure/CPT® Codes:        Procedure(s):  COLONOSCOPY to cecum ti  ESOPHAGOGASTRODUODENOSCOPY              Surgeon(s):  Joe Lee MD    Anesthesia: Monitored Anesthesia Care    Staff:   Endo Technician: Lili Mallory RN  Endo Nurse: Mae Brunner RN         Estimated Blood Loss: none    Urine Voided: * No values recorded between 12/11/2023  3:08 PM and 12/11/2023  3:44 PM *    Specimens:                None          Drains: * No LDAs found *    Findings: Colonoscopy to the terminal ileum with normal ileal mucosa.  Diminutive cecal AVM nonbleeding seen.  Internal hemorrhoids were noted but the remainder the colonic mucosa was normal.  EGD with a small sliding hiatal hernia and a mild Schatzki's ring no active esophagitis gastritis duodenitis seen.         Complications: None          Joe Lee MD     Date: 12/11/2023  Time: 15:45 EST

## 2023-12-11 NOTE — DISCHARGE INSTRUCTIONS

## 2023-12-12 ENCOUNTER — TELEPHONE (OUTPATIENT)
Dept: GASTROENTEROLOGY | Facility: CLINIC | Age: 73
End: 2023-12-12
Payer: MEDICARE

## 2023-12-12 NOTE — TELEPHONE ENCOUNTER
Capsule order placed. Routed to Bhargavi to schedule       ----- Message from Joe Lee MD sent at 12/11/2023  3:44 PM EST -----  Please put the order in for a small bowel capsule for history iron deficiency and melena with negative EGD colonoscopy

## 2023-12-13 ENCOUNTER — TELEPHONE (OUTPATIENT)
Dept: GASTROENTEROLOGY | Facility: CLINIC | Age: 73
End: 2023-12-13
Payer: MEDICARE

## 2023-12-13 NOTE — TELEPHONE ENCOUNTER
CALLER: Dustin Wilkerson    RELATIONSHIP TO PATIENT: Self    BEST CALL BACK NUMBER: [phone]    CALL REGARDING: Schedule Capsule Study     Transferred Call to Bhargavi

## 2023-12-13 NOTE — TELEPHONE ENCOUNTER
SPOKE WITH PT SCHEDULED FOR MARCH 7 AT 0800 AM PT WAS OFFERED [facility] 10 BUT DUE TO VACTION PLANS WAS UNABLE TO ACCEPT THAT DATE . MAILED INSTRUCTIONS

## 2023-12-18 ENCOUNTER — TELEPHONE (OUTPATIENT)
Dept: CARDIOLOGY | Facility: CLINIC | Age: 73
End: 2023-12-18
Payer: MEDICARE

## 2023-12-18 NOTE — TELEPHONE ENCOUNTER
Let's watch it a bit; maybe he can call after the holidays with an update.     Thanks!  MAYNOR Meneses

## 2023-12-18 NOTE — TELEPHONE ENCOUNTER
Reviewed recommendations with patient, verbalized understanding, will call with any further questions or complaints.    Asya Christianson RN  Triage Nurse  12/18/23 14:31 EST

## 2023-12-18 NOTE — TELEPHONE ENCOUNTER
I spoke with pt.  He states that some days his BP is running 140-150's/80's, and some days he is running 110-120's/60-70's.  Denies any symptoms at this time.    Thanks so much,  Asya Christianson, RN  Triage RN  12/18/23 13:31 EST

## 2023-12-18 NOTE — TELEPHONE ENCOUNTER
----- Message from MAYNOR Peng sent at 12/4/2023 11:19 AM EST -----  Call to check on blood pressure with addition of 2.5 mg amlodipine.

## 2023-12-21 ENCOUNTER — LAB (OUTPATIENT)
Dept: LAB | Facility: HOSPITAL | Age: 73
End: 2023-12-21
Payer: MEDICARE

## 2023-12-21 ENCOUNTER — TELEPHONE (OUTPATIENT)
Dept: GASTROENTEROLOGY | Facility: CLINIC | Age: 73
End: 2023-12-21
Payer: MEDICARE

## 2023-12-21 ENCOUNTER — OFFICE VISIT (OUTPATIENT)
Dept: ONCOLOGY | Facility: CLINIC | Age: 73
End: 2023-12-21
Payer: MEDICARE

## 2023-12-21 VITALS
DIASTOLIC BLOOD PRESSURE: 83 MMHG | HEART RATE: 63 BPM | SYSTOLIC BLOOD PRESSURE: 129 MMHG | BODY MASS INDEX: 27.59 KG/M2 | HEIGHT: 72 IN | TEMPERATURE: 96.8 F | WEIGHT: 203.7 LBS | RESPIRATION RATE: 16 BRPM | OXYGEN SATURATION: 97 %

## 2023-12-21 DIAGNOSIS — K86.9 PANCREATIC LESION: ICD-10-CM

## 2023-12-21 DIAGNOSIS — D50.0 IRON DEFICIENCY ANEMIA DUE TO CHRONIC BLOOD LOSS: ICD-10-CM

## 2023-12-21 DIAGNOSIS — I26.93 SINGLE SUBSEGMENTAL PULMONARY EMBOLISM WITHOUT ACUTE COR PULMONALE: ICD-10-CM

## 2023-12-21 DIAGNOSIS — I26.93 SINGLE SUBSEGMENTAL PULMONARY EMBOLISM WITHOUT ACUTE COR PULMONALE: Primary | ICD-10-CM

## 2023-12-21 DIAGNOSIS — D64.9 NORMOCHROMIC NORMOCYTIC ANEMIA: ICD-10-CM

## 2023-12-21 DIAGNOSIS — K62.5 BLOOD PER RECTUM: ICD-10-CM

## 2023-12-21 LAB
ALBUMIN SERPL-MCNC: 4.2 G/DL (ref 3.5–5.2)
ALBUMIN/GLOB SERPL: 1.6 G/DL
ALP SERPL-CCNC: 63 U/L (ref 39–117)
ALT SERPL W P-5'-P-CCNC: 25 U/L (ref 1–41)
ANION GAP SERPL CALCULATED.3IONS-SCNC: 9 MMOL/L (ref 5–15)
AST SERPL-CCNC: 29 U/L (ref 1–40)
BASOPHILS # BLD AUTO: 0.02 10*3/MM3 (ref 0–0.2)
BASOPHILS NFR BLD AUTO: 0.4 % (ref 0–1.5)
BILIRUB SERPL-MCNC: 0.3 MG/DL (ref 0–1.2)
BUN SERPL-MCNC: 24 MG/DL (ref 8–23)
BUN/CREAT SERPL: 15.9 (ref 7–25)
CALCIUM SPEC-SCNC: 9.4 MG/DL (ref 8.6–10.5)
CHLORIDE SERPL-SCNC: 107 MMOL/L (ref 98–107)
CO2 SERPL-SCNC: 26 MMOL/L (ref 22–29)
CREAT SERPL-MCNC: 1.51 MG/DL (ref 0.76–1.27)
DEPRECATED RDW RBC AUTO: 42.3 FL (ref 37–54)
EGFRCR SERPLBLD CKD-EPI 2021: 48.5 ML/MIN/1.73
EOSINOPHIL # BLD AUTO: 0.18 10*3/MM3 (ref 0–0.4)
EOSINOPHIL NFR BLD AUTO: 3.4 % (ref 0.3–6.2)
ERYTHROCYTE [DISTWIDTH] IN BLOOD BY AUTOMATED COUNT: 12.7 % (ref 12.3–15.4)
FERRITIN SERPL-MCNC: 75 NG/ML (ref 30–400)
GLOBULIN UR ELPH-MCNC: 2.7 GM/DL
GLUCOSE SERPL-MCNC: 123 MG/DL (ref 65–99)
HCT VFR BLD AUTO: 44.1 % (ref 37.5–51)
HGB BLD-MCNC: 15 G/DL (ref 13–17.7)
IMM GRANULOCYTES # BLD AUTO: 0.01 10*3/MM3 (ref 0–0.05)
IMM GRANULOCYTES NFR BLD AUTO: 0.2 % (ref 0–0.5)
IRON 24H UR-MRATE: 55 MCG/DL (ref 59–158)
IRON SATN MFR SERPL: 17 % (ref 20–50)
LYMPHOCYTES # BLD AUTO: 1.85 10*3/MM3 (ref 0.7–3.1)
LYMPHOCYTES NFR BLD AUTO: 34.8 % (ref 19.6–45.3)
MCH RBC QN AUTO: 30.9 PG (ref 26.6–33)
MCHC RBC AUTO-ENTMCNC: 34 G/DL (ref 31.5–35.7)
MCV RBC AUTO: 90.7 FL (ref 79–97)
MONOCYTES # BLD AUTO: 0.49 10*3/MM3 (ref 0.1–0.9)
MONOCYTES NFR BLD AUTO: 9.2 % (ref 5–12)
NEUTROPHILS NFR BLD AUTO: 2.76 10*3/MM3 (ref 1.7–7)
NEUTROPHILS NFR BLD AUTO: 52 % (ref 42.7–76)
NRBC BLD AUTO-RTO: 0 /100 WBC (ref 0–0.2)
PLATELET # BLD AUTO: 185 10*3/MM3 (ref 140–450)
PMV BLD AUTO: 9.1 FL (ref 6–12)
POTASSIUM SERPL-SCNC: 4.6 MMOL/L (ref 3.5–5.2)
PROT SERPL-MCNC: 6.9 G/DL (ref 6–8.5)
RBC # BLD AUTO: 4.86 10*6/MM3 (ref 4.14–5.8)
SODIUM SERPL-SCNC: 142 MMOL/L (ref 136–145)
TIBC SERPL-MCNC: 329 MCG/DL (ref 298–536)
TRANSFERRIN SERPL-MCNC: 235 MG/DL (ref 200–360)
WBC NRBC COR # BLD AUTO: 5.31 10*3/MM3 (ref 3.4–10.8)

## 2023-12-21 PROCEDURE — 1160F RVW MEDS BY RX/DR IN RCRD: CPT | Performed by: INTERNAL MEDICINE

## 2023-12-21 PROCEDURE — 83540 ASSAY OF IRON: CPT

## 2023-12-21 PROCEDURE — 85025 COMPLETE CBC W/AUTO DIFF WBC: CPT

## 2023-12-21 PROCEDURE — 36415 COLL VENOUS BLD VENIPUNCTURE: CPT

## 2023-12-21 PROCEDURE — 3074F SYST BP LT 130 MM HG: CPT | Performed by: INTERNAL MEDICINE

## 2023-12-21 PROCEDURE — 80053 COMPREHEN METABOLIC PANEL: CPT

## 2023-12-21 PROCEDURE — 1126F AMNT PAIN NOTED NONE PRSNT: CPT | Performed by: INTERNAL MEDICINE

## 2023-12-21 PROCEDURE — 84466 ASSAY OF TRANSFERRIN: CPT

## 2023-12-21 PROCEDURE — 82728 ASSAY OF FERRITIN: CPT

## 2023-12-21 PROCEDURE — 3079F DIAST BP 80-89 MM HG: CPT | Performed by: INTERNAL MEDICINE

## 2023-12-21 PROCEDURE — 99214 OFFICE O/P EST MOD 30 MIN: CPT | Performed by: INTERNAL MEDICINE

## 2023-12-21 PROCEDURE — 1159F MED LIST DOCD IN RCRD: CPT | Performed by: INTERNAL MEDICINE

## 2023-12-21 NOTE — TELEPHONE ENCOUNTER
CALLER: Dustin Luington    RELATIONSHIP TO PATIENT: Self    BEST CALL BACK NUMBER: [phone]    CALL REGARDING:  Received a Letter from our office stating that Capsule Study was Not Medically Necessary    Mr. Wilkerson is here in office and informed that an Upper and Lower Procedure was done on 12/11/2023     Capsule Study was scheduled for 3/7/2024 at 8am    Ms. Burk came up to  to Explain   Minocycline Pregnancy And Lactation Text: This medication is Pregnancy Category D and not consider safe during pregnancy. It is also excreted in breast milk. Isotretinoin Counseling: Patient should get monthly blood tests, not donate blood, not drive at night if vision affected, not share medication, and not undergo elective surgery for 6 months after tx completed. Side effects reviewed, pt to contact office should one occur. Benzoyl Peroxide Counseling: Patient counseled that medicine may cause skin irritation and bleach clothing.  In the event of skin irritation, the patient was advised to reduce the amount of the drug applied or use it less frequently.   The patient verbalized understanding of the proper use and possible adverse effects of benzoyl peroxide.  All of the patient's questions and concerns were addressed. Topical Sulfur Applications Pregnancy And Lactation Text: This medication is Pregnancy Category C and has an unknown safety profile during pregnancy. It is unknown if this topical medication is excreted in breast milk. Tazorac Counseling:  Patient advised that medication is irritating and drying.  Patient may need to apply sparingly and wash off after an hour before eventually leaving it on overnight.  The patient verbalized understanding of the proper use and possible adverse effects of tazorac.  All of the patient's questions and concerns were addressed. Topical Sulfur Applications Counseling: Topical Sulfur Counseling: Patient counseled that this medication may cause skin irritation or allergic reactions.  In the event of skin irritation, the patient was advised to reduce the amount of the drug applied or use it less frequently.   The patient verbalized understanding of the proper use and possible adverse effects of topical sulfur application.  All of the patient's questions and concerns were addressed. Bactrim Counseling:  I discussed with the patient the risks of sulfa antibiotics including but not limited to GI upset, allergic reaction, drug rash, diarrhea, dizziness, photosensitivity, and yeast infections.  Rarely, more serious reactions can occur including but not limited to aplastic anemia, agranulocytosis, methemoglobinemia, blood dyscrasias, liver or kidney failure, lung infiltrates or desquamative/blistering drug rashes. Dapsone Pregnancy And Lactation Text: This medication is Pregnancy Category C and is not considered safe during pregnancy or breast feeding. Benzoyl Peroxide Pregnancy And Lactation Text: This medication is Pregnancy Category C. It is unknown if benzoyl peroxide is excreted in breast milk. Spironolactone Counseling: Patient advised regarding risks of diarrhea, abdominal pain, hyperkalemia, birth defects (for female patients), liver toxicity and renal toxicity. The patient may need blood work to monitor liver and kidney function and potassium levels while on therapy. The patient verbalized understanding of the proper use and possible adverse effects of spironolactone.  All of the patient's questions and concerns were addressed. Topical Clindamycin Counseling: Patient counseled that this medication may cause skin irritation or allergic reactions.  In the event of skin irritation, the patient was advised to reduce the amount of the drug applied or use it less frequently.   The patient verbalized understanding of the proper use and possible adverse effects of clindamycin.  All of the patient's questions and concerns were addressed. Include Pregnancy/Lactation Warning?: No Birth Control Pills Counseling: Birth Control Pill Counseling: I discussed with the patient the potential side effects of OCPs including but not limited to increased risk of stroke, heart attack, thrombophlebitis, deep venous thrombosis, hepatic adenomas, breast changes, GI upset, headaches, and depression.  The patient verbalized understanding of the proper use and possible adverse effects of OCPs. All of the patient's questions and concerns were addressed. Tazorac Pregnancy And Lactation Text: This medication is not safe during pregnancy. It is unknown if this medication is excreted in breast milk. Bactrim Pregnancy And Lactation Text: This medication is Pregnancy Category D and is known to cause fetal risk.  It is also excreted in breast milk. Isotretinoin Pregnancy And Lactation Text: This medication is Pregnancy Category X and is considered extremely dangerous during pregnancy. It is unknown if it is excreted in breast milk. Doxycycline Counseling:  Patient counseled regarding possible photosensitivity and increased risk for sunburn.  Patient instructed to avoid sunlight, if possible.  When exposed to sunlight, patients should wear protective clothing, sunglasses, and sunscreen.  The patient was instructed to call the office immediately if the following severe adverse effects occur:  hearing changes, easy bruising/bleeding, severe headache, or vision changes.  The patient verbalized understanding of the proper use and possible adverse effects of doxycycline.  All of the patient's questions and concerns were addressed. Topical Retinoid counseling:  Patient advised to apply a pea-sized amount only at bedtime and wait 30 minutes after washing their face before applying.  If too drying, patient may add a non-comedogenic moisturizer. The patient verbalized understanding of the proper use and possible adverse effects of retinoids.  All of the patient's questions and concerns were addressed. Azithromycin Counseling:  I discussed with the patient the risks of azithromycin including but not limited to GI upset, allergic reaction, drug rash, diarrhea, and yeast infections. Birth Control Pills Pregnancy And Lactation Text: This medication should be avoided if pregnant and for the first 30 days post-partum. Erythromycin Counseling:  I discussed with the patient the risks of erythromycin including but not limited to GI upset, allergic reaction, drug rash, diarrhea, increase in liver enzymes, and yeast infections. Doxycycline Pregnancy And Lactation Text: This medication is Pregnancy Category D and not consider safe during pregnancy. It is also excreted in breast milk but is considered safe for shorter treatment courses. High Dose Vitamin A Counseling: Side effects reviewed, pt to contact office should one occur. Spironolactone Pregnancy And Lactation Text: This medication can cause feminization of the male fetus and should be avoided during pregnancy. The active metabolite is also found in breast milk. Dapsone Counseling: I discussed with the patient the risks of dapsone including but not limited to hemolytic anemia, agranulocytosis, rashes, methemoglobinemia, kidney failure, peripheral neuropathy, headaches, GI upset, and liver toxicity.  Patients who start dapsone require monitoring including baseline LFTs and weekly CBCs for the first month, then every month thereafter.  The patient verbalized understanding of the proper use and possible adverse effects of dapsone.  All of the patient's questions and concerns were addressed. Minocycline Counseling: Patient advised regarding possible photosensitivity and discoloration of the teeth, skin, lips, tongue and gums.  Patient instructed to avoid sunlight, if possible.  When exposed to sunlight, patients should wear protective clothing, sunglasses, and sunscreen.  The patient was instructed to call the office immediately if the following severe adverse effects occur:  hearing changes, easy bruising/bleeding, severe headache, or vision changes.  The patient verbalized understanding of the proper use and possible adverse effects of minocycline.  All of the patient's questions and concerns were addressed. Erythromycin Pregnancy And Lactation Text: This medication is Pregnancy Category B and is considered safe during pregnancy. It is also excreted in breast milk. Tetracycline Counseling: Patient counseled regarding possible photosensitivity and increased risk for sunburn.  Patient instructed to avoid sunlight, if possible.  When exposed to sunlight, patients should wear protective clothing, sunglasses, and sunscreen.  The patient was instructed to call the office immediately if the following severe adverse effects occur:  hearing changes, easy bruising/bleeding, severe headache, or vision changes.  The patient verbalized understanding of the proper use and possible adverse effects of tetracycline.  All of the patient's questions and concerns were addressed. Patient understands to avoid pregnancy while on therapy due to potential birth defects. Topical Clindamycin Pregnancy And Lactation Text: This medication is Pregnancy Category B and is considered safe during pregnancy. It is unknown if it is excreted in breast milk. High Dose Vitamin A Pregnancy And Lactation Text: High dose vitamin A therapy is contraindicated during pregnancy and breast feeding. Detail Level: Simple Azithromycin Pregnancy And Lactation Text: This medication is considered safe during pregnancy and is also secreted in breast milk. Topical Retinoid Pregnancy And Lactation Text: This medication is Pregnancy Category C. It is unknown if this medication is excreted in breast milk.

## 2023-12-21 NOTE — PROGRESS NOTES
Subjective     CHIEF COMPLAINT:      Chief Complaint   Patient presents with    Follow-up       HISTORY OF PRESENT ILLNESS:     Dustin Wilkerson is a 73 y.o. male patient who returns today for follow up on his pulmonary embolism and anemia.  He underwent EGD and colonoscopy with Dr. Lee.  He is going to have EUS with Dr. Rios tomorrow.    Patient was tolerating oral iron.  It is currently on hold due to the upcoming EUS procedure.  He is not having abdominal or mid back pain.    Patient reports intermittent black stools.  The color is different from the color of the stool since starting the oral iron.    ROS:  Pertinent ROS is in the HPI.     Past medical, surgical, social and family history were reviewed.     MEDICATIONS:    Current Outpatient Medications:     allopurinol (ZYLOPRIM) 100 MG tablet, Take 1 tablet by mouth Daily., Disp: , Rfl:     amLODIPine (NORVASC) 2.5 MG tablet, Take 1 tablet by mouth Daily., Disp: , Rfl:     benazepril (LOTENSIN) 40 MG tablet, Take 1 tablet by mouth Daily., Disp: , Rfl:     ferrous sulfate 325 (65 FE) MG tablet, Take 1 tablet by mouth Daily With Breakfast., Disp: 90 tablet, Rfl: 1    levothyroxine (SYNTHROID, LEVOTHROID) 100 MCG tablet, TAKE ONE TABLET BY MOUTH DAILY, Disp: , Rfl:     metoprolol succinate XL (TOPROL-XL) 50 MG 24 hr tablet, Take 1 tablet by mouth 2 (Two) Times a Day., Disp: 180 tablet, Rfl: 3    Multiple Vitamin (MULTI VITAMIN PO), Take 1 tablet by mouth Daily., Disp: , Rfl:     pantoprazole (PROTONIX) 40 MG EC tablet, Take 1 tablet by mouth Daily., Disp: 90 tablet, Rfl: 3    rivaroxaban (XARELTO) 20 MG tablet, Take 1 tablet by mouth Daily., Disp: 90 tablet, Rfl: 3    rosuvastatin (CRESTOR) 20 MG tablet, Take 1 tablet by mouth Every Night., Disp: 90 tablet, Rfl: 3    sildenafil (REVATIO) 20 MG tablet, Take 1 tablet by mouth As Needed., Disp: , Rfl:     tamsulosin (FLOMAX) 0.4 MG capsule 24 hr capsule, Take 1 capsule by mouth Daily., Disp: , Rfl:      "zolpidem (AMBIEN) 5 MG tablet, Take 1 tablet by mouth At Night As Needed., Disp: , Rfl:   Objective     VITAL SIGNS:     Vitals:    12/21/23 0849   BP: 129/83   Pulse: 63   Resp: 16   Temp: 96.8 °F (36 °C)   TempSrc: Temporal   SpO2: 97%   Weight: 92.4 kg (203 lb 11.2 oz)   Height: 182.9 cm (72.01\")   PainSc: 0-No pain     Body mass index is 27.62 kg/m².     Wt Readings from Last 5 Encounters:   12/21/23 92.4 kg (203 lb 11.2 oz)   12/11/23 91.7 kg (202 lb 1.6 oz)   12/04/23 93 kg (205 lb)   11/17/23 94.6 kg (208 lb 9.6 oz)   11/15/23 93.8 kg (206 lb 11.2 oz)     PHYSICAL EXAMINATION:   GENERAL: The patient appears in good general condition, not in acute distress.   SKIN: No ecchymosis.  EYES: No jaundice. No Pallor.  CHEST: Normal respiratory effort.  Lungs clear bilaterally.  No added sounds.  CVS: Normal S1-S2.  No murmurs.  ABDOMEN: Soft. No tenderness. No Hepatomegaly. No Splenomegaly. No masses.  EXTREMITIES: No noted deformity.     DIAGNOSTIC DATA:     Results from last 7 days   Lab Units 12/21/23  0844   WBC 10*3/mm3 5.31   NEUTROS ABS 10*3/mm3 2.76   HEMOGLOBIN g/dL 15.0   HEMATOCRIT % 44.1   PLATELETS 10*3/mm3 185     Results from last 7 days   Lab Units 12/21/23  0844   SODIUM mmol/L 142   POTASSIUM mmol/L 4.6   CHLORIDE mmol/L 107   CO2 mmol/L 26.0   BUN mg/dL 24*   CREATININE mg/dL 1.51*   CALCIUM mg/dL 9.4   ALBUMIN g/dL 4.2   BILIRUBIN mg/dL 0.3   ALK PHOS U/L 63   ALT (SGPT) U/L 25   AST (SGOT) U/L 29   GLUCOSE mg/dL 123*         Lab 12/21/23  0844   IRON 55*   IRON SATURATION (TSAT) 17*   TIBC 329   TRANSFERRIN 235   FERRITIN 75.00      Component      Latest Ref Rng 11/15/2023   Beta-2 Glyco 1 IgG      0 - 20 GPI IgG units <9    Beta-2 Glyco 1 IgA      0 - 25 GPI IgA units <9    Beta-2 Glyco 1 IgM      0 - 32 GPI IgM units <9    Anticardiolipin IgG      0 - 14 GPL U/mL <9    Anticardiolipin IgM      0 - 12 MPL U/mL <9    Anticardiolipin IgA      0 - 11 APL U/mL <9    Protein S Ag, Total      60 - 150 " % 77    Protein S Ag, Free      61 - 136 % 102    ANTITHROMBIN ACTIVITY      90 - 134 % 115    Factor V Leiden      Normal  Normal    Protein C Antigen      60 - 150 % 88    Protein S Functional      70 - 127 % 95    Factor II, DNA Analysis      Normal  Normal    Protein C Activity      86 - 163 % 83 (L)       EGD on 12/11/2023:  - Widely patent, non-obstructing and mild Schatzki ring.  - Small hiatal hernia.  - Normal mucosa was found in the entire stomach.  - Normal examined duodenum.  - No specimens collected.    Colonoscopy on 12/11/2023:  - The examined portion of the ileum was normal.  - A single non-bleeding colonic angioectasia.  - Non-bleeding internal hemorrhoids.  - The examination was otherwise normal.  - No specimens collected.    Assessment & Plan    *Acute subsegmental pulmonary embolism involving the left lower lobe pulmonary artery.  It was identified incidentally on CT angiogram on 10/9/2023 (CT obtained for f/u on aortic aneurysm).  Patient did not have chest pain or shortness of breath at the time of diagnosis.  The radiologist reported that there was a right lower lobe embolus on review of prior CT in 2019.    This is concerning for chronic recurrent pulmonary embolism.  Venous Doppler of the lower extremities on 10/30/2023 showed no evidence of DVT.  Patient traveled in August 2023 to [place] driving 1200 miles over 2 days and returning back 7 days later.  Patient retired in July 2023.  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  Family history is negative for DVT/PE.  He does not have symptoms of underlying autoimmune disease like systemic lupus erythematosus.  Patient was started on Xarelto on 10/10/2023.  He is tolerating it except for rectal bleeding as below.  The bleeding subsided after the dose of Xarelto changed to 20 mg daily.  Thrombophilia workup on 11/15/2023 was negative.  Protein C activity was 83% but this was not considered to be clinically low to  represent deficiency.  Due to the recurrent pulmonary embolism, I recommended lifelong anticoagulation.  He is tolerating Xarelto well.  Alter was interrupted to allow patient to have EGD and colonoscopy.    *2.7 x 3.2 cm cystic appearing lesion in the anterior body of the pancreas.  This was reported to be slightly larger when compared to October 2022.  He was referred to GI.  He is going to have EUS and biopsy by Dr. Rios on 12/22/2023.    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  Hemoglobin decreased to 11.3 on 11/1/2023.  11/15/2023: Hemoglobin 12.8.  He was placed on ferrous sulfate 325 mg daily.  12/21/2023: Hemoglobin improved to 15.0.    *Blood per rectum.  Patient reported developing blood per rectum when he was taking the starter pack Xarelto which subsided after he started taking the 20 mg/day dose.  He had EGD and colonoscopy.  No clear source of blood loss was identified.  Single nonbleeding colonic angiectasia was reported.  He is going to have capsule endoscopy in March 2024.    PLAN:    1.  Resume Xarelto 24 hours after the upcoming EUS.   2.  Resume ferrous sulfate after the EUS.   3.  Await the results of the small bowel capsule endoscopy in March 2024.   4.  If the patient's EUS biopsy does not show evidence of malignancy, we will see him in follow-up in April 2024.  Will obtain CBC CMP ferritin iron panel.       Paula Marte MD  12/21/23

## 2023-12-28 RX ORDER — ROSUVASTATIN CALCIUM 20 MG/1
20 TABLET, COATED ORAL NIGHTLY
Qty: 90 TABLET | Refills: 0 | Status: SHIPPED | OUTPATIENT
Start: 2023-12-28

## 2024-01-02 RX ORDER — METOPROLOL SUCCINATE 50 MG/1
50 TABLET, EXTENDED RELEASE ORAL 2 TIMES DAILY
Qty: 180 TABLET | Refills: 3 | Status: SHIPPED | OUTPATIENT
Start: 2024-01-02

## 2024-01-02 NOTE — TELEPHONE ENCOUNTER
NOV-03/18/24-RM  LOV-12/04/23-MM    Coronary artery calcification: This was noted on previous CTA chest.  October 2022 CTA chest showed scattered calcification of the LAD and of the proximal RCA; the other coronary arteries appear to be patent. He did have a treadmill stress test in October 2021 showing no evidence of ischemia.  His medical therapy includes 20 mg rosuvastatin; aspirin has been stopped due to bleeding but he is on Xarelto.  Bilateral carotid artery stenosis: Last imaging was May 2022 showing mild left internal carotid artery stenosis and plaquing of the right internal carotid artery.  He is on Xarelto and rosuvastatin.  Ascending aorta dilation: This has been followed since April 2017 and has remained stable.  CTA chest in October 2023 showed the ascending thoracic aorta measuring 4.6 cm.  He also has known dilation of the aortic root.  It was noted to measure 4.7 cm in October 2022.  It measured 4.3 on November 2023 echocardiogram.  Hypertension: BP high in office today; he feels it is getting better since he started oral iron.  Hyperlipidemia: Lipids at goal when checked in April 2023; he is on 20 mg rosuvastatin daily.  Will continue to follow.  Acute PE: October 2023 CTA chest did show a small occlusive embolus within a proximal third order branch of the left lower pulmonary artery; per radiology there was some concern that he had chronic PE.  His RV to LV ratio was normal.  He was started on Xarelto; evaluation with Dr. Marte is ongoing.  CKD 3A: Renal functions been stable in 2023.  History of CVA: He had an MRI hea in 2015 for hearing loss which showed an old, chronic infarct in the left corona radiata.  He is on Xarelto and rosuvastatin.  Pancreatic cyst: This was previously noted on imaging and was stable but there was evidence of possible increase in size on October 2023 CTA chest; he is to have FNA biopsy with Dr. Rios.  Near syncope: He has had some melena since starting Xarelto which  has been intermittent.  He feels since starting oral iron this is better.  He has scheduled EGD and colonoscopy upcoming.     Mr. Wilkerson is a patient of Dr. Saleh's with history of recurrent syncope, ascending aorta dilation and dilated aortic root which has been stable, and history of old stroke.  His most recent CTA chest in October 2023 showed a small pulmonary embolus and he was started on Xarelto.  His blood pressure is elevated today so I am going to start back 2.5 mg amlodipine daily with low threshold to increase back to 5 mg.     He is going to [place] in April and would like follow-up in March; I will see if Dr. Saleh can see him at that time.

## 2024-01-30 NOTE — ANESTHESIA PREPROCEDURE EVALUATION
Anesthesia Evaluation     Patient summary reviewed and Nursing notes reviewed   NPO Solid Status: > 6 hours  NPO Liquid Status: > 2 hours           Airway   Mallampati: II  TM distance: >3 FB  Neck ROM: full  Dental - normal exam     Pulmonary    (+) pulmonary embolism (on xarelto),  (-) COPD, asthma, not a smoker  Cardiovascular   Exercise tolerance: good (4-7 METS)    ECG reviewed    (+) hypertension, CAD (calcification noted on CT), hyperlipidemia  (-) past MI, dysrhythmias, angina    ROS comment: Nl TTE 2023    Nl stress test 2021     Neuro/Psych  (+) syncope  (-) seizures, CVA  GI/Hepatic/Renal/Endo    (+) GERD, renal disease- CRI  (-) liver disease, diabetes, no thyroid disorder    Musculoskeletal     Abdominal    Substance History      OB/GYN          Other                    Anesthesia Plan    ASA 3     MAC       Anesthetic plan, risks, benefits, and alternatives have been provided, discussed and informed consent has been obtained with: patient.    CODE STATUS:         
no

## 2024-02-07 NOTE — TELEPHONE ENCOUNTER
----- Message from Aliza Saleh MD sent at 12/7/2023  1:01 PM EST -----  No other changes.   ----- Message -----  From: Miracle Perez APRN  Sent: 12/7/2023   7:29 AM EST  To: Aliza Saleh MD    I saw him on Monday and it took me a minute to compare these findings to what we discussed when I saw him.   He states he had a few episodes of his heart racing in bed while wearing Zio but none since he turned it in.     His Zio showed 1564 episodes of SVT vs. A-tach, 6.3% PACs, and 2.0% PVCs. Longest SVT episode was 55 seconds at 109 bpm and fasting 190bpm for 6 beats. Predominant rhythm NSR with average HR 60.     His HR when I saw him was 55; he was a bit hypertensive, so I added back 2.5 mg amlodipine. He is on 50 mg metoprolol tartrate.     Any changes given that he has not had symptoms for a few weeks? Maybe he needs better BP control?   He is on Xarelto and undergoing Heme evaluation.     Nohemi     [Follow-Up: _____] : a [unfilled] follow-up visit

## 2024-03-06 ENCOUNTER — TELEPHONE (OUTPATIENT)
Dept: GASTROENTEROLOGY | Facility: CLINIC | Age: 74
End: 2024-03-06

## 2024-03-06 NOTE — TELEPHONE ENCOUNTER
"    Hub staff attempted to follow warm transfer process and was unsuccessful     Caller: Dustin Wilkerson \"Pat\"    Relationship to patient: Self    Best call back number: [phone]    Patient is needing:  PATIENT IS NEEDING TO DISCUSS PREP INSTRUCTIONS FOR CAPSULE.    "

## 2024-03-07 ENCOUNTER — TELEPHONE (OUTPATIENT)
Dept: GASTROENTEROLOGY | Facility: CLINIC | Age: 74
End: 2024-03-07
Payer: MEDICARE

## 2024-03-07 ENCOUNTER — CLINICAL SUPPORT (OUTPATIENT)
Dept: GASTROENTEROLOGY | Facility: CLINIC | Age: 74
End: 2024-03-07
Payer: MEDICARE

## 2024-03-07 DIAGNOSIS — D50.0 IRON DEFICIENCY ANEMIA DUE TO CHRONIC BLOOD LOSS: Primary | ICD-10-CM

## 2024-03-07 PROCEDURE — 91110 GI TRC IMG INTRAL ESOPH-ILE: CPT | Performed by: INTERNAL MEDICINE

## 2024-03-07 NOTE — TELEPHONE ENCOUNTER
Called patient. He states the prep instructions were not very clear but he was able to speak with his daughter who is a doctor about the prep. He said he also only held his iron for 5 days.

## 2024-03-07 NOTE — TELEPHONE ENCOUNTER
"      Hub staff attempted to follow warm transfer process and was unsuccessful      Caller: Dustin Wilkerson \"Pat\"     Relationship to patient: Self     Best call back number: [phone]     Patient is needing:  PATIENT IS NEEDING TO DISCUSS PREP INSTRUCTIONS FOR CAPSULE.       "

## 2024-03-08 ENCOUNTER — OFFICE VISIT (OUTPATIENT)
Dept: CARDIOLOGY | Facility: CLINIC | Age: 74
End: 2024-03-08
Payer: MEDICARE

## 2024-03-08 VITALS
HEART RATE: 63 BPM | DIASTOLIC BLOOD PRESSURE: 75 MMHG | HEIGHT: 72 IN | WEIGHT: 210 LBS | SYSTOLIC BLOOD PRESSURE: 140 MMHG | BODY MASS INDEX: 28.44 KG/M2

## 2024-03-08 DIAGNOSIS — N18.31 STAGE 3A CHRONIC KIDNEY DISEASE: ICD-10-CM

## 2024-03-08 DIAGNOSIS — E78.2 MIXED HYPERLIPIDEMIA: ICD-10-CM

## 2024-03-08 DIAGNOSIS — I25.84 CORONARY ARTERY CALCIFICATION: Primary | ICD-10-CM

## 2024-03-08 DIAGNOSIS — I77.810 ASCENDING AORTA DILATATION: ICD-10-CM

## 2024-03-08 DIAGNOSIS — I10 ESSENTIAL HYPERTENSION: ICD-10-CM

## 2024-03-08 DIAGNOSIS — I47.10 PSVT (PAROXYSMAL SUPRAVENTRICULAR TACHYCARDIA): ICD-10-CM

## 2024-03-08 DIAGNOSIS — I25.10 CORONARY ARTERY CALCIFICATION: Primary | ICD-10-CM

## 2024-03-08 NOTE — PROGRESS NOTES
"      [facility] CARDIOLOGY  [address]  [address]  [place] [address]  Phone: [phone]  Fax: [phone]  Patient Name: Dustin Wilkerson  :1950  Age: 73 y.o.  Primary Cardiologist: Aliza Saleh MD  Encounter Provider:  MAYNOR Peng    History of Present Illness     Dustin Wilkerson is a 73 y.o.  male whose medical history includes hypertension, hyperlipidemia, CKD 3, bilateral carotid artery stenosis, recurrent falls, history of left corona radiata lacunar infarct, monoclonal gammopathy, pulmonary nodules.  He is followed in our office by Dr. Saleh for ascending aorta dilation, coronary artery calcification, and history of near syncope. I have reviewed the past medical records in preparation of today's visit.     24 Follow-up:  He is here for 3-month follow-up.  We did check a monitor after last visit for palpitations which did show frequent episodes of SVT.  He feels like these are much better.  This might have been due to low blood pressure and weight loss and blood loss.  He brings with him a BP log and BP ranges from 90/60-1 60/80 but most readings are 1 30-70.  He will have occasional lightheadedness but this is rare.  He denies chest pain, dyspnea with exertion, orthopnea, or leg swelling.  He is taking his medications as prescribed.  He still planning to go to [facility] on vacation in May 2024.    Data Review     The following data was reviewed by MAYNOR Peng on 24:    Vital Signs:   /75   Pulse 63   Ht 182.9 cm (72\")   Wt 95.3 kg (210 lb)   BMI 28.48 kg/m²       Weight:  Wt Readings from Last 3 Encounters:   24 95.3 kg (210 lb)   23 92.4 kg (203 lb 11.2 oz)   23 91.7 kg (202 lb 1.6 oz)     Body mass index is 28.48 kg/m².    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  Is this a refill or new RX: new  RX name and strength (copy/paste from chart):  potassium chloride SA (K-DUR,KLOR-CON M) 10 MEQ tablet  Is this a 30 day or 90 day RX:   Pharmacy name and phone # (copy/paste from chart):      [facility] [address] [address]  Phone: [phone] Fax: [phone]       business.  He has 1 caffeinated beverage daily and 1 bottle of water daily.  He does not use alcohol or smoke.  There is no history of premature cardiovascular disease in his family.  His mom had history of strokes.  2015 MRI of the head for hearing loss showed an old, chronic infarct in the left espinoza radiata.  Dr. Dimas Richmond felt this was due to to hypertension.  November 2016 he was walking down his yard, bent over to turn the hose off and fell.  He is not sure if he passed out but he did awake with right arm injury which was moderately severe.  He did not have any warning or preceding symptoms.  December 2016 he walked out on his porch late at night and then the next thing he knew he awoke on the ground.  Again he had no preceding symptoms or warning.  He sustained a severe right leg injury at that time and required multiple surgeries to repair these quadriceps of his right leg.  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  Echocardiogram at that time showed EF 65%, mild dilation of the aortic root.  CTA at that time showed a dilated aortic root, tubular ascending aorta measuring 4.7 cm and 4.5 cm respectively.  There was also a 2.2 cm cystic lesion of the tail of the pancreas.  September 2021 CTA chest showed the aortic root to measure 4.4 cm, the ascending aorta measured 4.3 cm which was stable compared to CT done in June 2019.  Dr. Saleh reviewed the CT images and saw significant calcification of the proximal LAD and scattered calcification throughout the RCA.  October 2021 treadmill stress study showed no evidence of ischemia; he exercised for 7-1/2 minutes on a Beka protocol without difficulty and had normal BP response.  May 2022 carotid artery duplex showed mild left internal carotid artery stenosis and plaquing of the right internal carotid artery.  October 2022  CTA chest showed the aortic root to measure 4.7 cm which was up from 4.6 in May 2022.  Again the images were reviewed by Dr. Saleh who noted scattered calcification of the LAD but was otherwise patent, patent proximal left circumflex, patent first diagonal branch, patent left main coronary artery, and patent RCA but with calcification in the proximal vessel.  October 2023 CTA chest showed mild dilation of the ascending thoracic aorta measuring up to 4.6 cm, mild stenosis of less than 50% of the origin of the left subclavian artery, and moderate coronary artery calcification burden, a small pericardial effusion, moderate dilation of the main pulmonary arteries right greater than left, a small occlusive embolus within a proximal third order branch of the lower left pulmonary artery but no other discernible embolus.  His RV to LV ratio was normal.,  Stable cystic lesion of the body of the pancreas.  October 2023 bilateral lower extremity venous Doppler study was negative for evidence of DVT.  November 2023 echocardiogram showed EF 59%, normal diastolic function, normal RVSP, aortic root measuring 3.9 cm, and mild dilation of the ascending aorta measuring 4.3 cm.  This is stable compared to previous.  December 2023 14 day Zio patch monitor showed the predominant rhythm was NSR, 6.3% PAC burden, 1564 episodes of SVT with the longest lasting 55 seconds and the fastest 190 bpm, 2.0% PVC burden.     Labs:  4/28/2023:  cr 1.2, K 4.7, otherwise unremarkable CMP, Chol 125, HDL 44, LDL 64, Trig 88, Hgb 14.2, Plt 206, HgbA1c 6.5, TSH 1.640  09/1/2023:  cr 1.5, K 5.1, otherwise unremarkable BMP, HgbA1c 5.9  11/15/2023: Iron 71, ferritin 41, TSAT 19, Hgb 12.8,   12/21/2023:  cr 1.5, K 4.6,, otherwise unremarkable CMP, iron 55, ferritin 75, TSAT 17%, Hgb 15.0,       ECG 12 Lead    Date/Time: 3/8/2024 9:25 AM  Performed by: Miracle Perez APRN    Authorized by: Miracle Perez APRN   Comparison: compared with previous ECG from 12/4/2023  Similar to previous ECG  Rhythm: sinus rhythm  Ectopy: atrial premature contractions  Rate: normal  BPM: 63  T inversion: V1 and III          Medications     Allergies as of 03/08/2024    (No Known Allergies)       Current Outpatient Medications   Medication Instructions    allopurinol (ZYLOPRIM) 100 MG tablet 1 tablet, Oral, Daily    amLODIPine (NORVASC) 2.5 mg, Oral, Daily    benazepril (LOTENSIN) 40 MG tablet 1 tablet, Oral, Daily    ferrous sulfate 325 mg, Oral, Daily With Breakfast    levothyroxine (SYNTHROID, LEVOTHROID) 100 MCG tablet TAKE ONE TABLET BY MOUTH DAILY    metoprolol succinate XL (TOPROL-XL) 50 mg, Oral, 2 Times Daily    Multiple Vitamin (MULTI VITAMIN PO) 1 tablet, Oral, Daily    pantoprazole (PROTONIX) 40 mg, Oral, Daily    rivaroxaban (XARELTO) 20 mg, Oral, Daily    rosuvastatin (CRESTOR) 20 mg, Oral, Nightly    sildenafil (REVATIO) 20 mg, Oral, As Needed    tamsulosin (FLOMAX) 0.4 mg, Oral, Daily    zolpidem (AMBIEN) 5 MG tablet Take 1 tablet by mouth At Night As Needed.        Past History, Review of Systems, Exam     Past Medical History:   Diagnosis Date    Aneurysm 5 years ago    Aortic root dilation 03/30/2022    Ascending aorta dilatation 03/30/2022    Bilateral carotid artery stenosis     Coronary artery calcification 03/30/2022    Essential hypertension     Falls     GERD (gastroesophageal reflux disease)     Gout     Hearing loss     Hyperlipidemia     Hypertension     Lumbar spondylolysis     Neuropathy     Single subsegmental pulmonary embolism without acute cor pulmonale 10/30/2023    Snoring     Stage 3a chronic kidney disease 03/30/2022    Syncope        Past Surgical History:   has a past surgical history that includes Knee surgery (2016); Colonoscopy (2016); Pancreas Biopsy (11/2023); Colonoscopy (N/A, 12/11/2023); and Esophagogastroduodenoscopy (N/A, 12/11/2023).     Social History     Socioeconomic History    Marital  status:      Spouse name: Jess   Tobacco Use    Smoking status: Never    Smokeless tobacco: Never   Vaping Use    Vaping status: Never Used   Substance and Sexual Activity    Alcohol use: Never     Comment: caffeine use: None    Drug use: Never    Sexual activity: Yes     Partners: Female     Birth control/protection: None       Review of Systems   Cardiovascular:  Positive for palpitations. Negative for chest pain, claudication, cyanosis, dyspnea on exertion, irregular heartbeat, leg swelling, near-syncope, orthopnea, paroxysmal nocturnal dyspnea and syncope.   Neurological:  Negative for dizziness and paresthesias.       Vitals reviewed.   Constitutional:       Appearance: Not in distress.   Eyes:      Conjunctiva/sclera: Conjunctivae normal.      Pupils: Pupils are equal, round, and reactive to light.   HENT:      Head: Normocephalic.      Nose: Nose normal.    Mouth/Throat:      Pharynx: Oropharynx is clear.   Neck:      Vascular: JVD normal.   Pulmonary:      Effort: Pulmonary effort is normal.      Breath sounds: Normal breath sounds. No wheezing. No rhonchi. No rales.   Cardiovascular:      Normal rate. Regular rhythm. Normal S1. Normal S2.       Murmurs: There is no murmur.   Pulses:     Intact distal pulses.   Edema:     Peripheral edema absent.   Abdominal:      General: Bowel sounds are normal. There is no distension.      Palpations: Abdomen is soft.      Tenderness: There is no abdominal tenderness.   Musculoskeletal: Normal range of motion.      Cervical back: Normal range of motion and neck supple. Skin:     General: Skin is warm and dry.   Neurological:      Mental Status: Alert and oriented to person, place and time.   Psychiatric:         Attention and Perception: Attention normal.         Mood and Affect: Mood normal.         Speech: Speech normal.         Behavior: Behavior is cooperative.          Assessment and Plan     Assessment:  1. Coronary artery calcification    2. Ascending aorta  dilatation    3. Essential hypertension    4. PSVT (paroxysmal supraventricular tachycardia)    5. Mixed hyperlipidemia    6. Stage 3a chronic kidney disease             PSVT: December 2023 Zio patch monitor showed frequent brief episodes of SVT.  This was in the setting of acute blood loss following GI bleed and low blood pressure.  His blood pressure is better controlled now and he reports palpitations have resolved.  Coronary artery calcification: This was noted on previous CTA chest.  October 2022 CTA chest showed scattered calcification of the LAD and of the proximal RCA; the other coronary arteries appear to be patent. He did have a treadmill stress test in October 2021 showing no evidence of ischemia.  His medical therapy includes 20 mg rosuvastatin, 50 mg metoprolol twice daily, 40 mg of benazepril, 2.5 mg amlodipine; aspirin has been stopped due to bleeding but he is on Xarelto.  Bilateral carotid artery stenosis: Last imaging was May 2022 showing mild left internal carotid artery stenosis and plaquing of the right internal carotid artery.  He is on Xarelto and rosuvastatin.  Ascending aorta dilation: This has been followed since April 2017 and has remained stable.  CTA chest in October 2023 showed the ascending thoracic aorta measuring 4.6 cm.  He also has known dilation of the aortic root.  It was noted to measure 4.7 cm in October 2022.  It measured 4.3 on November 2023 echocardiogram.  He is scheduled for noncontrasted CT chest in November 2024.  Hypertension: Overall BP remains controlled.  Hyperlipidemia: Lipids at goal when checked in April 2023; he is on 20 mg rosuvastatin daily.  No recent lipids to review.  Acute PE: October 2023 CTA chest did show a small occlusive embolus within a proximal third order branch of the left lower pulmonary artery; per radiology there was some concern that he had chronic PE.  His RV to LV ratio was normal.  He was started on Xarelto; Dr. Marte recommends lifelong  anticoagulation due to recurrent embolism.  CKD 3A: Renal functions been stable in December 2023.  History of CVA: He had an MRI hea in 2015 for hearing loss which showed an old, chronic infarct in the left corona radiata.  He is on Xarelto and rosuvastatin.  Pancreatic cyst: This was previously noted on imaging and was stable but there was evidence of possible increase in size on October 2023 CTA chest; he is to have FNA biopsy with Dr. Rios.  Near syncope: He has had some melena since starting Xarelto which has been intermittent.  He feels since starting oral iron this is better.  He has scheduled EGD and colonoscopy upcoming.    Mr. Wilkerson is a patient of Dr. Saleh's with history of recurrent syncope, ascending aorta dilation and dilated aortic root which has been stable, and history of old stroke.  His most recent CTA chest in October 2023 showed a small pulmonary embolus and he was started on Xarelto.  His blood pressure is elevated today so I am going to start back 2.5 mg amlodipine daily with low threshold to increase back to 5 mg.    He is going to [place] in May 2024;  I think he is fine to travel but asked him to call if he have any concerns prior to leaving.  Barring any surprises, I will have him see Dr. Saleh in 6 months.    Return in about 6 months (around 9/8/2024) for Follow-up, Dr. Saleh.  Orders Placed This Encounter   Procedures    ECG 12 Lead      No orders of the defined types were placed in this encounter.        Thank you for the opportunity to participate in this patient's care.    MAYNOR Meneses    This office note has been dictated.

## 2024-03-26 ENCOUNTER — TELEPHONE (OUTPATIENT)
Dept: GASTROENTEROLOGY | Facility: CLINIC | Age: 74
End: 2024-03-26

## 2024-03-28 RX ORDER — ROSUVASTATIN CALCIUM 20 MG/1
20 TABLET, COATED ORAL NIGHTLY
Qty: 90 TABLET | Refills: 3 | Status: SHIPPED | OUTPATIENT
Start: 2024-03-28

## 2024-03-28 NOTE — TELEPHONE ENCOUNTER
Mr.Harrington called our office this afternoon regarding a refill for his rosuvastatin looks like demetrice may have tried to send it in was flag checked.   LOV:3/8/24   NOV:10/4/24  LABS:12/22/23    Thanks!

## 2024-04-01 ENCOUNTER — TELEPHONE (OUTPATIENT)
Dept: GASTROENTEROLOGY | Facility: CLINIC | Age: 74
End: 2024-04-01
Payer: MEDICARE

## 2024-04-01 NOTE — TELEPHONE ENCOUNTER
Hub to relay  Pt called in requesting to speak to Bethany about test results, I advised she is on vacation, and will have a nurse call him back.

## 2024-04-01 NOTE — TELEPHONE ENCOUNTER
Called patient. He is concerned that it has taken over a month and he still has no results for his capsule study.

## 2024-04-03 NOTE — TELEPHONE ENCOUNTER
Capsule has been read, results scanned into media of the patient chart and encounter routed to [facility] to call patient with results.

## 2024-04-03 NOTE — TELEPHONE ENCOUNTER
Per Dr. Lee:  Retained food in the in the gastric lumen found.   Recommend monitor Iron saturation and CBC q3 months x4.     Call to patient per Dr. Lee's results and recommendations.   Patient verbalized understanding

## 2024-04-17 ENCOUNTER — LAB (OUTPATIENT)
Dept: OTHER | Facility: HOSPITAL | Age: 74
End: 2024-04-17
Payer: MEDICARE

## 2024-04-17 ENCOUNTER — OFFICE VISIT (OUTPATIENT)
Dept: ONCOLOGY | Facility: CLINIC | Age: 74
End: 2024-04-17
Payer: MEDICARE

## 2024-04-17 VITALS
HEART RATE: 59 BPM | BODY MASS INDEX: 28.78 KG/M2 | OXYGEN SATURATION: 97 % | HEIGHT: 72 IN | WEIGHT: 212.5 LBS | SYSTOLIC BLOOD PRESSURE: 115 MMHG | TEMPERATURE: 97.8 F | RESPIRATION RATE: 16 BRPM | DIASTOLIC BLOOD PRESSURE: 72 MMHG

## 2024-04-17 DIAGNOSIS — K86.9 PANCREATIC LESION: ICD-10-CM

## 2024-04-17 DIAGNOSIS — I26.93 SINGLE SUBSEGMENTAL PULMONARY EMBOLISM WITHOUT ACUTE COR PULMONALE: Primary | ICD-10-CM

## 2024-04-17 DIAGNOSIS — I26.93 SINGLE SUBSEGMENTAL PULMONARY EMBOLISM WITHOUT ACUTE COR PULMONALE: ICD-10-CM

## 2024-04-17 DIAGNOSIS — D50.0 IRON DEFICIENCY ANEMIA DUE TO CHRONIC BLOOD LOSS: ICD-10-CM

## 2024-04-17 DIAGNOSIS — K62.5 BLOOD PER RECTUM: ICD-10-CM

## 2024-04-17 LAB
ALBUMIN SERPL-MCNC: 4.3 G/DL (ref 3.5–5.2)
ALBUMIN/GLOB SERPL: 1.8 G/DL
ALP SERPL-CCNC: 64 U/L (ref 39–117)
ALT SERPL W P-5'-P-CCNC: 23 U/L (ref 1–41)
ANION GAP SERPL CALCULATED.3IONS-SCNC: 9.9 MMOL/L (ref 5–15)
AST SERPL-CCNC: 24 U/L (ref 1–40)
BASOPHILS # BLD AUTO: 0.02 10*3/MM3 (ref 0–0.2)
BASOPHILS NFR BLD AUTO: 0.4 % (ref 0–1.5)
BILIRUB SERPL-MCNC: 0.7 MG/DL (ref 0–1.2)
BUN SERPL-MCNC: 23 MG/DL (ref 8–23)
BUN/CREAT SERPL: 17.2 (ref 7–25)
CALCIUM SPEC-SCNC: 9.3 MG/DL (ref 8.6–10.5)
CHLORIDE SERPL-SCNC: 105 MMOL/L (ref 98–107)
CO2 SERPL-SCNC: 24.1 MMOL/L (ref 22–29)
CREAT SERPL-MCNC: 1.34 MG/DL (ref 0.76–1.27)
DEPRECATED RDW RBC AUTO: 44.2 FL (ref 37–54)
EGFRCR SERPLBLD CKD-EPI 2021: 55.9 ML/MIN/1.73
EOSINOPHIL # BLD AUTO: 0.11 10*3/MM3 (ref 0–0.4)
EOSINOPHIL NFR BLD AUTO: 2 % (ref 0.3–6.2)
ERYTHROCYTE [DISTWIDTH] IN BLOOD BY AUTOMATED COUNT: 13.8 % (ref 12.3–15.4)
FERRITIN SERPL-MCNC: 79.3 NG/ML (ref 30–400)
GLOBULIN UR ELPH-MCNC: 2.4 GM/DL
GLUCOSE SERPL-MCNC: 182 MG/DL (ref 65–99)
HCT VFR BLD AUTO: 41.6 % (ref 37.5–51)
HGB BLD-MCNC: 14.6 G/DL (ref 13–17.7)
IMM GRANULOCYTES # BLD AUTO: 0.01 10*3/MM3 (ref 0–0.05)
IMM GRANULOCYTES NFR BLD AUTO: 0.2 % (ref 0–0.5)
IRON 24H UR-MRATE: 166 MCG/DL (ref 59–158)
IRON SATN MFR SERPL: 46 % (ref 20–50)
LYMPHOCYTES # BLD AUTO: 1.68 10*3/MM3 (ref 0.7–3.1)
LYMPHOCYTES NFR BLD AUTO: 31.2 % (ref 19.6–45.3)
MCH RBC QN AUTO: 30.9 PG (ref 26.6–33)
MCHC RBC AUTO-ENTMCNC: 35.1 G/DL (ref 31.5–35.7)
MCV RBC AUTO: 88.1 FL (ref 79–97)
MONOCYTES # BLD AUTO: 0.39 10*3/MM3 (ref 0.1–0.9)
MONOCYTES NFR BLD AUTO: 7.2 % (ref 5–12)
NEUTROPHILS NFR BLD AUTO: 3.18 10*3/MM3 (ref 1.7–7)
NEUTROPHILS NFR BLD AUTO: 59 % (ref 42.7–76)
NRBC BLD AUTO-RTO: 0 /100 WBC (ref 0–0.2)
PLATELET # BLD AUTO: 157 10*3/MM3 (ref 140–450)
PMV BLD AUTO: 9.1 FL (ref 6–12)
POTASSIUM SERPL-SCNC: 4.5 MMOL/L (ref 3.5–5.2)
PROT SERPL-MCNC: 6.7 G/DL (ref 6–8.5)
RBC # BLD AUTO: 4.72 10*6/MM3 (ref 4.14–5.8)
SODIUM SERPL-SCNC: 139 MMOL/L (ref 136–145)
TIBC SERPL-MCNC: 364 MCG/DL (ref 298–536)
TRANSFERRIN SERPL-MCNC: 244 MG/DL (ref 200–360)
WBC NRBC COR # BLD AUTO: 5.39 10*3/MM3 (ref 3.4–10.8)

## 2024-04-17 PROCEDURE — 80053 COMPREHEN METABOLIC PANEL: CPT | Performed by: INTERNAL MEDICINE

## 2024-04-17 PROCEDURE — 3078F DIAST BP <80 MM HG: CPT | Performed by: INTERNAL MEDICINE

## 2024-04-17 PROCEDURE — 1126F AMNT PAIN NOTED NONE PRSNT: CPT | Performed by: INTERNAL MEDICINE

## 2024-04-17 PROCEDURE — 36415 COLL VENOUS BLD VENIPUNCTURE: CPT

## 2024-04-17 PROCEDURE — 1160F RVW MEDS BY RX/DR IN RCRD: CPT | Performed by: INTERNAL MEDICINE

## 2024-04-17 PROCEDURE — 99214 OFFICE O/P EST MOD 30 MIN: CPT | Performed by: INTERNAL MEDICINE

## 2024-04-17 PROCEDURE — 1159F MED LIST DOCD IN RCRD: CPT | Performed by: INTERNAL MEDICINE

## 2024-04-17 PROCEDURE — 3074F SYST BP LT 130 MM HG: CPT | Performed by: INTERNAL MEDICINE

## 2024-04-17 PROCEDURE — 85025 COMPLETE CBC W/AUTO DIFF WBC: CPT | Performed by: INTERNAL MEDICINE

## 2024-04-17 PROCEDURE — 82728 ASSAY OF FERRITIN: CPT | Performed by: INTERNAL MEDICINE

## 2024-04-17 PROCEDURE — 84466 ASSAY OF TRANSFERRIN: CPT | Performed by: INTERNAL MEDICINE

## 2024-04-17 PROCEDURE — 83540 ASSAY OF IRON: CPT | Performed by: INTERNAL MEDICINE

## 2024-04-17 RX ORDER — FERROUS SULFATE 325(65) MG
325 TABLET ORAL WEEKLY
Start: 2024-04-17

## 2024-04-17 NOTE — PROGRESS NOTES
Subjective     CHIEF COMPLAINT:      Chief Complaint   Patient presents with    Follow-up     No concerns      HISTORY OF PRESENT ILLNESS:     Dustin Wilkerson is a 73 y.o. male patient who returns today for follow up on his pulmonary embolism, DVT and anemia.  He returns today for follow-up reporting no new symptoms.  He had the capsule endoscopy but was not contacted yet about the results.    Patient is taking oral iron once daily.  No upset stomach or constipation.  He reports that the stool appears somewhat black.    ROS:  Pertinent ROS is in the HPI.     Past medical, surgical, social and family history were reviewed.     MEDICATIONS:    Current Outpatient Medications:     allopurinol (ZYLOPRIM) 100 MG tablet, Take 1 tablet by mouth Daily., Disp: , Rfl:     amLODIPine (NORVASC) 2.5 MG tablet, Take 1 tablet by mouth Daily., Disp: , Rfl:     benazepril (LOTENSIN) 40 MG tablet, Take 1 tablet by mouth Daily., Disp: , Rfl:     ferrous sulfate 325 (65 FE) MG tablet, Take 1 tablet by mouth Daily With Breakfast., Disp: 90 tablet, Rfl: 1    levothyroxine (SYNTHROID, LEVOTHROID) 100 MCG tablet, TAKE ONE TABLET BY MOUTH DAILY, Disp: , Rfl:     metoprolol succinate XL (TOPROL-XL) 50 MG 24 hr tablet, TAKE ONE TABLET BY MOUTH TWICE A DAY, Disp: 180 tablet, Rfl: 3    Multiple Vitamin (MULTI VITAMIN PO), Take 1 tablet by mouth Daily., Disp: , Rfl:     pantoprazole (PROTONIX) 40 MG EC tablet, Take 1 tablet by mouth Daily., Disp: 90 tablet, Rfl: 3    rivaroxaban (XARELTO) 20 MG tablet, Take 1 tablet by mouth Daily., Disp: 90 tablet, Rfl: 3    rosuvastatin (CRESTOR) 20 MG tablet, Take 1 tablet by mouth Every Night., Disp: 90 tablet, Rfl: 3    sildenafil (REVATIO) 20 MG tablet, Take 1 tablet by mouth As Needed., Disp: , Rfl:     tamsulosin (FLOMAX) 0.4 MG capsule 24 hr capsule, Take 1 capsule by mouth Daily., Disp: , Rfl:     zolpidem (AMBIEN) 5 MG tablet, Take 1 tablet by mouth At Night As Needed., Disp: , Rfl:   Objective  "    VITAL SIGNS:     Vitals:    04/17/24 1319   BP: 115/72   Pulse: 59   Resp: 16   Temp: 97.8 °F (36.6 °C)   TempSrc: Temporal   SpO2: 97%   Weight: 96.4 kg (212 lb 8 oz)   Height: 182.9 cm (72.01\")   PainSc: 0-No pain     Body mass index is 28.81 kg/m².     Wt Readings from Last 5 Encounters:   04/17/24 96.4 kg (212 lb 8 oz)   03/08/24 95.3 kg (210 lb)   12/21/23 92.4 kg (203 lb 11.2 oz)   12/11/23 91.7 kg (202 lb 1.6 oz)   12/04/23 93 kg (205 lb)     PHYSICAL EXAMINATION:   GENERAL: The patient appears in good general condition, not in acute distress.   SKIN: No ecchymosis.  EYES: No jaundice. No Pallor.  CHEST: Normal respiratory effort.  Lungs clear bilaterally.  No added sounds.  CVS: Normal S1-S2.  No murmurs.  ABDOMEN: Soft. No tenderness. No Hepatomegaly. No Splenomegaly. No masses.  EXTREMITIES: No edema.    DIAGNOSTIC DATA:     Results from last 7 days   Lab Units 04/17/24  1313   WBC 10*3/mm3 5.39   NEUTROS ABS 10*3/mm3 3.18   HEMOGLOBIN g/dL 14.6   HEMATOCRIT % 41.6   PLATELETS 10*3/mm3 157     Results from last 7 days   Lab Units 04/17/24  1313   SODIUM mmol/L 139   POTASSIUM mmol/L 4.5   CHLORIDE mmol/L 105   CO2 mmol/L 24.1   BUN mg/dL 23   CREATININE mg/dL 1.34*   CALCIUM mg/dL 9.3   ALBUMIN g/dL 4.3   BILIRUBIN mg/dL 0.7   ALK PHOS U/L 64   ALT (SGPT) U/L 23   AST (SGOT) U/L 24   GLUCOSE mg/dL 182*         Lab 04/17/24  1313   IRON 166*   IRON SATURATION (TSAT) 46   TIBC 364   TRANSFERRIN 244   FERRITIN 79.30      Assessment & Plan    *Acute subsegmental pulmonary embolism involving the left lower lobe pulmonary artery.  It was identified incidentally on CT angiogram on 10/9/2023 (CT obtained for f/u on aortic aneurysm).  Patient did not have chest pain or shortness of breath at the time of diagnosis.  The radiologist reported that there was a right lower lobe embolus on review of prior CT in 2019.    This is concerning for chronic recurrent pulmonary embolism.  Venous Doppler of the lower " extremities on 10/30/2023 showed no evidence of DVT.  Patient traveled in August 2023 to [place] driving 1200 miles over 2 days and returning back 7 days later.  Patient retired in July 2023.  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  Family history is negative for DVT/PE.  Patient was started on Xarelto on 10/10/2023.   He had rectal bleeding that subsided after the dose of Xarelto changed from 15 mg BID to 20 mg daily.  Thrombophilia workup on 11/15/2023 was negative.  Protein C activity was 83% but this was not considered to be clinically low to represent deficiency.  Due to the recurrent pulmonary embolism, lifelong anticoagulation was recommended.  In addition, he requires anticoagulation due to his cardiac disease with history of stroke.  He is tolerating Xarelto.  No problem with bleeding.  No symptoms of recurrent thrombosis.    *2.7 x 3.2 cm cystic appearing lesion in the anterior body of the pancreas.  This was reported to be slightly larger when compared to October 2022.  He had EUS by Dr. Romo on 12/22/2023.  Cytology exam revealed hypocellular specimen with few benign appearing epithelial cells, negative for malignant cells.  I reassured the patient about the result.    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  Hemoglobin decreased to 11.3 on 11/1/2023.  11/15/2023: Hemoglobin 12.8.  He was placed on ferrous sulfate 325 mg daily.  12/21/2023: Hemoglobin improved to 15.0.  4/17/2024: Hemoglobin 14.6.  Ferritin 79.3.  Transferrin saturation improved to 46%.    *Black stools.  Patient reported developing bleeding when he was taking the starter pack Xarelto which subsided after he started taking the 20 mg/day dose.  He had EGD and colonoscopy.  No clear source of blood loss was identified.  Single nonbleeding colonic angiectasia was reported.  Capsule endoscopy in March 2024 revealed no evidence of blood loss.    PLAN:    1.  Continue Xarelto 20 mg daily.    2.  Reduce ferrous sulfate to once weekly.   3.  Follow-up in 6 months.  Will obtain CBC CMP ferritin and iron panel.       Paula Marte MD  04/17/24

## 2024-04-24 RX ORDER — AMLODIPINE BESYLATE 2.5 MG/1
2.5 TABLET ORAL DAILY
Qty: 90 TABLET | Refills: 3 | Status: SHIPPED | OUTPATIENT
Start: 2024-04-24

## 2024-09-23 ENCOUNTER — HOSPITAL ENCOUNTER (OUTPATIENT)
Dept: CT IMAGING | Facility: HOSPITAL | Age: 74
Discharge: HOME | End: 2024-09-23
Admitting: NURSE PRACTITIONER
Payer: MEDICARE

## 2024-09-23 DIAGNOSIS — R91.1 LUNG NODULE: ICD-10-CM

## 2024-09-23 PROCEDURE — 71250 CT THORAX DX C-: CPT

## 2024-09-27 ENCOUNTER — TELEPHONE (OUTPATIENT)
Dept: SURGERY | Facility: CLINIC | Age: 74
End: 2024-09-27
Payer: MEDICARE

## 2024-10-04 ENCOUNTER — TELEPHONE (OUTPATIENT)
Dept: CARDIOLOGY | Facility: CLINIC | Age: 74
End: 2024-10-04

## 2024-10-04 ENCOUNTER — LAB (OUTPATIENT)
Dept: LAB | Facility: HOSPITAL | Age: 74
End: 2024-10-04
Payer: MEDICARE

## 2024-10-04 ENCOUNTER — OFFICE VISIT (OUTPATIENT)
Dept: CARDIOLOGY | Facility: CLINIC | Age: 74
End: 2024-10-04
Payer: MEDICARE

## 2024-10-04 VITALS
HEIGHT: 72 IN | RESPIRATION RATE: 18 BRPM | WEIGHT: 222.3 LBS | SYSTOLIC BLOOD PRESSURE: 130 MMHG | HEART RATE: 69 BPM | OXYGEN SATURATION: 97 % | BODY MASS INDEX: 30.11 KG/M2 | DIASTOLIC BLOOD PRESSURE: 76 MMHG

## 2024-10-04 DIAGNOSIS — I25.10 CORONARY ARTERY CALCIFICATION: ICD-10-CM

## 2024-10-04 DIAGNOSIS — I10 ESSENTIAL HYPERTENSION: ICD-10-CM

## 2024-10-04 DIAGNOSIS — I77.810 ASCENDING AORTA DILATATION: ICD-10-CM

## 2024-10-04 DIAGNOSIS — I25.10 CORONARY ARTERY CALCIFICATION: Primary | ICD-10-CM

## 2024-10-04 DIAGNOSIS — K86.9 PANCREATIC LESION: ICD-10-CM

## 2024-10-04 DIAGNOSIS — D50.0 IRON DEFICIENCY ANEMIA DUE TO CHRONIC BLOOD LOSS: ICD-10-CM

## 2024-10-04 DIAGNOSIS — I26.93 SINGLE SUBSEGMENTAL PULMONARY EMBOLISM WITHOUT ACUTE COR PULMONALE: ICD-10-CM

## 2024-10-04 DIAGNOSIS — I77.810 AORTIC ROOT DILATION: ICD-10-CM

## 2024-10-04 DIAGNOSIS — N18.31 STAGE 3A CHRONIC KIDNEY DISEASE: ICD-10-CM

## 2024-10-04 DIAGNOSIS — E78.2 MIXED HYPERLIPIDEMIA: ICD-10-CM

## 2024-10-04 LAB
ALBUMIN SERPL-MCNC: 4.3 G/DL (ref 3.5–5.2)
ALBUMIN/GLOB SERPL: 1.6 G/DL
ALP SERPL-CCNC: 67 U/L (ref 39–117)
ALT SERPL W P-5'-P-CCNC: 27 U/L (ref 1–41)
ANION GAP SERPL CALCULATED.3IONS-SCNC: 8.6 MMOL/L (ref 5–15)
AST SERPL-CCNC: 28 U/L (ref 1–40)
BASOPHILS # BLD AUTO: 0.02 10*3/MM3 (ref 0–0.2)
BASOPHILS NFR BLD AUTO: 0.4 % (ref 0–1.5)
BILIRUB SERPL-MCNC: 0.5 MG/DL (ref 0–1.2)
BUN SERPL-MCNC: 23 MG/DL (ref 8–23)
BUN/CREAT SERPL: 14.5 (ref 7–25)
CALCIUM SPEC-SCNC: 9.6 MG/DL (ref 8.6–10.5)
CHLORIDE SERPL-SCNC: 103 MMOL/L (ref 98–107)
CHOLEST SERPL-MCNC: 142 MG/DL (ref 0–200)
CO2 SERPL-SCNC: 25.4 MMOL/L (ref 22–29)
CREAT SERPL-MCNC: 1.59 MG/DL (ref 0.76–1.27)
DEPRECATED RDW RBC AUTO: 42.8 FL (ref 37–54)
EGFRCR SERPLBLD CKD-EPI 2021: 45.3 ML/MIN/1.73
EOSINOPHIL # BLD AUTO: 0.13 10*3/MM3 (ref 0–0.4)
EOSINOPHIL NFR BLD AUTO: 2.5 % (ref 0.3–6.2)
ERYTHROCYTE [DISTWIDTH] IN BLOOD BY AUTOMATED COUNT: 13.2 % (ref 12.3–15.4)
FERRITIN SERPL-MCNC: 32.3 NG/ML (ref 30–400)
GLOBULIN UR ELPH-MCNC: 2.7 GM/DL
GLUCOSE SERPL-MCNC: 122 MG/DL (ref 65–99)
HCT VFR BLD AUTO: 44.6 % (ref 37.5–51)
HDLC SERPL-MCNC: 39 MG/DL (ref 40–60)
HGB BLD-MCNC: 14.9 G/DL (ref 13–17.7)
IMM GRANULOCYTES # BLD AUTO: 0.01 10*3/MM3 (ref 0–0.05)
IMM GRANULOCYTES NFR BLD AUTO: 0.2 % (ref 0–0.5)
IRON 24H UR-MRATE: 92 MCG/DL (ref 59–158)
IRON SATN MFR SERPL: 21 % (ref 20–50)
LDLC SERPL CALC-MCNC: 78 MG/DL (ref 0–100)
LDLC/HDLC SERPL: 1.93 {RATIO}
LYMPHOCYTES # BLD AUTO: 1.72 10*3/MM3 (ref 0.7–3.1)
LYMPHOCYTES NFR BLD AUTO: 32.8 % (ref 19.6–45.3)
MCH RBC QN AUTO: 29.2 PG (ref 26.6–33)
MCHC RBC AUTO-ENTMCNC: 33.4 G/DL (ref 31.5–35.7)
MCV RBC AUTO: 87.5 FL (ref 79–97)
MONOCYTES # BLD AUTO: 0.54 10*3/MM3 (ref 0.1–0.9)
MONOCYTES NFR BLD AUTO: 10.3 % (ref 5–12)
NEUTROPHILS NFR BLD AUTO: 2.83 10*3/MM3 (ref 1.7–7)
NEUTROPHILS NFR BLD AUTO: 53.8 % (ref 42.7–76)
PLATELET # BLD AUTO: 170 10*3/MM3 (ref 140–450)
PMV BLD AUTO: 8.9 FL (ref 6–12)
POTASSIUM SERPL-SCNC: 4.8 MMOL/L (ref 3.5–5.2)
PROT SERPL-MCNC: 7 G/DL (ref 6–8.5)
RBC # BLD AUTO: 5.1 10*6/MM3 (ref 4.14–5.8)
SODIUM SERPL-SCNC: 137 MMOL/L (ref 136–145)
TIBC SERPL-MCNC: 429 MCG/DL (ref 298–536)
TRANSFERRIN SERPL-MCNC: 288 MG/DL (ref 200–360)
TRIGL SERPL-MCNC: 139 MG/DL (ref 0–150)
VLDLC SERPL-MCNC: 25 MG/DL (ref 5–40)
WBC NRBC COR # BLD AUTO: 5.25 10*3/MM3 (ref 3.4–10.8)

## 2024-10-04 PROCEDURE — 36415 COLL VENOUS BLD VENIPUNCTURE: CPT

## 2024-10-04 PROCEDURE — 82728 ASSAY OF FERRITIN: CPT

## 2024-10-04 PROCEDURE — 85025 COMPLETE CBC W/AUTO DIFF WBC: CPT

## 2024-10-04 PROCEDURE — 80061 LIPID PANEL: CPT

## 2024-10-04 PROCEDURE — 80053 COMPREHEN METABOLIC PANEL: CPT

## 2024-10-04 PROCEDURE — 83540 ASSAY OF IRON: CPT

## 2024-10-04 PROCEDURE — 84466 ASSAY OF TRANSFERRIN: CPT

## 2024-10-04 RX ORDER — AMLODIPINE BESYLATE 5 MG/1
5 TABLET ORAL NIGHTLY
Qty: 90 TABLET | Refills: 3 | Status: SHIPPED | OUTPATIENT
Start: 2024-10-04

## 2024-10-04 NOTE — TELEPHONE ENCOUNTER
I spoke with Dustin Wilkerson and updated pt on results/recommendations from provider.  Pt verbalized understanding and has no further questions at this time.    Thank you,    Natividad ELLIS, RN  [place] [facility]  10/04/24 12:36 EDT

## 2024-10-04 NOTE — PROGRESS NOTES
Date of Office Visit: 10/04/2024  Encounter Provider: Aliza Saleh MD  Place of Service: [facility] CARDIOLOGY  Patient Name: Dustin Wilkerson  :1950      Patient ID:  Dustin Wilkerson is a 74 y.o. male is here for  followup for hypertension, dilated ascending aorta.        History of Present Illness    He has a history of hypertension, nonorthostatic dizziness and falls, gout, monoclonal gammopathy (Ghosheh), pulmonary nodules, CKD, small pulmonary embolism 10/2023, ascending aorta dilation, left corona radiata lacunar infarct, hearing loss, mild bilateral carotid artery stenosis, pancreatic cyst (Blanca), and hyperlipidemia.      In 2016, he was walking out in his yard, went over to turn the hose off, and fell.  He found himself on the ground.  He is not sure if he passed out and he had a right arm injury, which was moderately severe.  He was able to continue.  He said he did not feel his heart racing or skipping before this.  He had no dizziness.  He had no chest pain or pressure and no difficulty breathing.  He just did not really know what happened, and he has been a lifelong exerciser and has never had any symptoms with exercise.  On 2016, he had taken his medications.  It was late in the evening (11:00 at night), and he went onto his front porch and, the next thing he knew, he found himself on the ground.  He is not sure that he had any syncope.  In fact, he thinks he did not; however, he sustained a severe right leg injury and had to have multiple surgeries to repair all of his quadriceps tendons on his right leg.  He does not know why this happened.  Again, before this, he had no chest pain or difficulty breathing.  In talking with him, he is on numerous medications and he takes all of them at night, and he had taken all of them right before this second incident in 2016.  Because his injury was so severe, he has not walked since 2016.   With his surgeries, he was in a wheelchair for a while and then he was on a walker and now he is with a cane and has a big brace on his leg.       He has one caffeinated beverage a day and one 16-ounce bottle of water a day.  He has two children.  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  In his family, there is no premature cardiovascular disease.  His mother had strokes.  He does not use any alcohol and he does not smoke.        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  This was in 08/2015.  He has seen Dr. Sunny Mcelroy from ENT about his hearing loss.  He saw Dimas Richmond who felt that the lacunar infarct was due to hypertension.       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 After starting him on metoprolol for SVT.  His EF was 65%. He had mild dilation of the aortic root-CTA showed a dilated aortic root and tubular ascending aorta measuring 4.7 cm and 4.5 cm respectively. A 2.2 cm cystic lesion from the tail of the pancreas.      CTA of the chest for follow-up for ascending aortic aneurysm done 9/3/2021 showed the aortic root to measure 4.4, the ascending aorta 4.3 which was stable from prior CT done 6/27/2019.  I did review the CT images and there is significant calcification of the proximal LAD and scattered calcification throughout the RCA.  He had a treadmill stress study on 10/1/2021 which showed no evidence of ischemia, exercised for 7 minutes 30 seconds on a Beka protocol without difficulty, normal blood pressure response exercise.       He had a carotid duplex study done 5/25/2022 which showed mild left internal carotid artery stenosis and plaquing of the right internal carotid artery without stenosis.  He had a CT angiogram of  the chest done 10/19/2022 showing the aortic root measuring 4.7 cm, up from 4.6 cm in 5/25/2022.  I did review the CT images and there is scattered calcification in the left anterior descending artery but is otherwise widely patent, proximal circumflex is patent, first diagonal is patent, left main is patent, RCA is patent throughout, well visualized with calcification in the proximal vessel.    CTA chest done 10/9/2023 showed mild aneurysmal dilation of the ascending aorta at 4.6 cm, minimal increase from 2019, pancreatic cyst noted, small pulmonary embolism noted.  He had a 2-week Zio patch monitor done 11/9/2023 which showed sinus rhythm, no atrial fibrillation, intermittent nonsustained SVT with the longest episode being 55 seconds, occasional PVCs, frequent PACs at 6.3%, dizziness and palpitations occurred with PACs.  Echocardiogram done 11/9/2023 showed ejection fraction 59% with normal RVSP, aortic root 3.9 cm, ascending aorta 4.1 cm, normal diastolic function, no significant valvular abnormalities.  CT chest without contrast on 9/23/2024 showed no suspicious pulmonary nodules or mass, pancreatic cyst noted, enlarged pulmonary arteries.I did review the CT chest images done 9/2024 which showed calcification of the left main and LAD.    Labs on 4/17/2024 showed creatinine 1.34, potassium 4.5, otherwise normal CMP, iron 166 with otherwise normal iron studies, normal CBC.  He had normal TSH done 11/1/2023.  Lipids were last done 4/20/2023 showing triglycerides 88, total cholesterol 25, HDL 44, LDL 64, VLDL 18.  He has a pancreatic cyst and follows with Dr. Rios.    He is walking, he is checking his blood pressure.  His blood pressure is now high again running 140s.  He has no chest pain or pressure.  He has no orthopnea or PND.  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  He has no exertional dyspnea.  He saw Dr. Ferrell for CKD.  He is wearing to leave to go  to [facility] for a trip.  He was in [place] and enjoyed that.  His energy levels been stable.  He is taking his medications as directed without difficulty.    Past Medical History:   Diagnosis Date    Aneurysm 5 years ago    Annual monitoring    Aortic root dilation 03/30/2022    Ascending aorta dilatation 03/30/2022    Bilateral carotid artery stenosis     Coronary artery calcification 03/30/2022    Essential hypertension     Falls     3 unexplained    GERD (gastroesophageal reflux disease)     Gout     Hearing loss     Hyperlipidemia     Hypertension     Lumbar spondylolysis     Neuropathy     FEET    Single subsegmental pulmonary embolism without acute cor pulmonale 10/30/2023    Snoring     Stage 3a chronic kidney disease 03/30/2022    Syncope          Past Surgical History:   Procedure Laterality Date    COLONOSCOPY  2016    COLONOSCOPY N/A 12/11/2023    Procedure: COLONOSCOPY to cecum ti;  Surgeon: Joe Lee MD;  Location: [facility] ENDOSCOPY;  Service: Gastroenterology;  Laterality: N/A;  pre melena  post avm hemorrhoids    ENDOSCOPY N/A 12/11/2023    Procedure: ESOPHAGOGASTRODUODENOSCOPY;  Surgeon: Joe Lee MD;  Location: [facility] ENDOSCOPY;  Service: Gastroenterology;  Laterality: N/A;  pre anemia posthiatal hernia schotskys ring    KNEE SURGERY  2016    Right tendon repair quadriceps    PANCREAS BIOPSY  11/2023       Current Outpatient Medications on File Prior to Visit   Medication Sig Dispense Refill    allopurinol (ZYLOPRIM) 100 MG tablet Take 1 tablet by mouth Daily.      benazepril (LOTENSIN) 40 MG tablet Take 1 tablet by mouth Daily.      ferrous sulfate 325 (65 FE) MG tablet Take 1 tablet by mouth 1 (One) Time Per Week.      levothyroxine (SYNTHROID, LEVOTHROID) 100 MCG tablet TAKE ONE TABLET BY MOUTH DAILY      metoprolol succinate XL (TOPROL-XL) 50 MG 24 hr tablet TAKE ONE TABLET BY MOUTH TWICE A  tablet 3    Multiple Vitamin (MULTI VITAMIN PO) Take 1 tablet by mouth  "Daily.      pantoprazole (PROTONIX) 40 MG EC tablet Take 1 tablet by mouth Daily. 90 tablet 3    rivaroxaban (XARELTO) 20 MG tablet Take 1 tablet by mouth Daily. 90 tablet 3    rosuvastatin (CRESTOR) 20 MG tablet Take 1 tablet by mouth Every Night. 90 tablet 3    sildenafil (REVATIO) 20 MG tablet Take 1 tablet by mouth As Needed.      tamsulosin (FLOMAX) 0.4 MG capsule 24 hr capsule Take 1 capsule by mouth Daily.      zolpidem (AMBIEN) 5 MG tablet Take 1 tablet by mouth At Night As Needed.      [DISCONTINUED] amLODIPine (NORVASC) 2.5 MG tablet Take 1 tablet by mouth Daily. 90 tablet 3     No current facility-administered medications on file prior to visit.       Social History     Socioeconomic History    Marital status:      Spouse name: Jess   Tobacco Use    Smoking status: Never    Smokeless tobacco: Never   Vaping Use    Vaping status: Never Used   Substance and Sexual Activity    Alcohol use: Never     Comment: caffeine use: None    Drug use: Never    Sexual activity: Yes     Partners: Female     Birth control/protection: None             Procedures    ECG 12 Lead    Date/Time: 10/4/2024 11:03 AM  Performed by: Aliza Saleh MD    Authorized by: Aliza Saleh MD  Comparison: compared with previous ECG   Similar to previous ECG  Rhythm: sinus rhythm  Ectopy: atrial premature contractions    Clinical impression: normal ECG              Objective:      Vitals:    10/04/24 1037   BP: 130/76   BP Location: Left arm   Patient Position: Sitting   Cuff Size: Adult   Pulse: 69   Resp: 18   SpO2: 97%   Weight: 101 kg (222 lb 4.8 oz)   Height: 182.9 cm (72\")     Body mass index is 30.15 kg/m².    Vitals reviewed.   Constitutional:       General: Not in acute distress.     Appearance: Not diaphoretic.   Neck:      Vascular: No carotid bruit or JVD.   Pulmonary:      Effort: Pulmonary effort is normal.      Breath sounds: Normal breath sounds.   Cardiovascular:      Normal rate. Regular rhythm.      " Murmurs: There is no murmur.      No gallop.  No rub.   Pulses:     Intact distal pulses.      Carotid: 2+ bilaterally.     Radial: 2+ bilaterally.     Dorsalis pedis: 2+ bilaterally.     Posterior tibial: 2+ bilaterally.  Edema:     Peripheral edema absent.   Neurological:      Cranial Nerves: No cranial nerve deficit.         Lab Review:       Assessment:      Diagnosis Plan   1. Coronary artery calcification  Lipid Panel    Ambulatory Referral to Sleep Medicine      2. Essential hypertension  Ambulatory Referral to Sleep Medicine      3. Stage 3a chronic kidney disease  Ambulatory Referral to Sleep Medicine      4. Ascending aorta dilatation  Ambulatory Referral to Sleep Medicine      5. Mixed hyperlipidemia  Lipid Panel      6. Aortic root dilation          Hypertension, goal <120/80, blood pressure slightly high-checks at home.  Running 140/80 at home.  Hyperlipidemia.  On rosuvastatin.  Gout.    SVT on toprol and controlling it well  Dilated ascending aorta,  4.7 cm.  Stable.  He is on metoprolol 50 mg twice daily, it was just decreased from 75 twice daily due to low blood pressure.    Right lumbar radicular pain, seeing Dr. Richmond.  Due to disc disease at L3-L4.   Coronary artery calcification in the LAD and RCA, on rosuvastatin 20 mg nightly.    Normal treadmill stress study done 10/2021.  Mild left internal carotid artery stenosis.  Monoclonal gammopathy- follows with .   Pancreatic cyst - follows with Dr. Rios.   Pulmonary embolism, small, noted on CTA chest 10/2023, is on Xarelto and will be on lifelong Xarelto per Dr. Marte.   History of CVA: He had an MRI head in 2015 for hearing loss which showed an old, chronic infarct in the left corona radiata. He is on Xarelto and rosuvastatin.   Neuropathy of the feet.  CKD, sees Dr. Ferrell.     Plan:       Increase amlodipine to 5 mg nightly and remain on benazepril in the morning.  See Casi or Haseeb in 3 months.  Refer to sleep medicine for cardiac  risks, coronary calcification dilated pulmonary artery.

## 2024-10-09 ENCOUNTER — LAB (OUTPATIENT)
Dept: OTHER | Facility: HOSPITAL | Age: 74
End: 2024-10-09
Payer: MEDICARE

## 2024-10-09 ENCOUNTER — OFFICE VISIT (OUTPATIENT)
Dept: ONCOLOGY | Facility: CLINIC | Age: 74
End: 2024-10-09
Payer: MEDICARE

## 2024-10-09 VITALS
OXYGEN SATURATION: 97 % | TEMPERATURE: 98.7 F | RESPIRATION RATE: 16 BRPM | DIASTOLIC BLOOD PRESSURE: 84 MMHG | HEART RATE: 69 BPM | HEIGHT: 72 IN | BODY MASS INDEX: 30.25 KG/M2 | WEIGHT: 223.3 LBS | SYSTOLIC BLOOD PRESSURE: 157 MMHG

## 2024-10-09 DIAGNOSIS — I26.93 SINGLE SUBSEGMENTAL PULMONARY EMBOLISM WITHOUT ACUTE COR PULMONALE: Primary | ICD-10-CM

## 2024-10-09 DIAGNOSIS — D50.0 IRON DEFICIENCY ANEMIA DUE TO CHRONIC BLOOD LOSS: ICD-10-CM

## 2024-10-09 DIAGNOSIS — Z79.01 CHRONIC ANTICOAGULATION: ICD-10-CM

## 2024-10-09 NOTE — PROGRESS NOTES
Subjective     CHIEF COMPLAINT:      Chief Complaint   Patient presents with    Follow-up     HISTORY OF PRESENT ILLNESS:     Dustin Wilkerson is a 74 y.o. male patient who returns today for follow up on his prior history of PE and on his iron deficiency anemia.  He is on Xarelto 20 mg daily.  He is tolerating it well.  No problem with bleeding or excessive bruising.  He is taking the iron once weekly as instructed.  Stool color fluctuates depending on the day of the week he takes the iron.  No blood in the stool.  No abdominal pain.    ROS:  Pertinent ROS is in the HPI.     Past medical, surgical, social and family history were reviewed.     MEDICATIONS:    Current Outpatient Medications:     allopurinol (ZYLOPRIM) 100 MG tablet, Take 1 tablet by mouth Daily., Disp: , Rfl:     amLODIPine (NORVASC) 5 MG tablet, Take 1 tablet by mouth Every Night., Disp: 90 tablet, Rfl: 3    benazepril (LOTENSIN) 40 MG tablet, Take 1 tablet by mouth Daily., Disp: , Rfl:     ferrous sulfate 325 (65 FE) MG tablet, Take 1 tablet by mouth 1 (One) Time Per Week., Disp: , Rfl:     levothyroxine (SYNTHROID, LEVOTHROID) 100 MCG tablet, TAKE ONE TABLET BY MOUTH DAILY, Disp: , Rfl:     metoprolol succinate XL (TOPROL-XL) 50 MG 24 hr tablet, TAKE ONE TABLET BY MOUTH TWICE A DAY, Disp: 180 tablet, Rfl: 3    Multiple Vitamin (MULTI VITAMIN PO), Take 1 tablet by mouth Daily., Disp: , Rfl:     pantoprazole (PROTONIX) 40 MG EC tablet, Take 1 tablet by mouth Daily., Disp: 90 tablet, Rfl: 3    rivaroxaban (XARELTO) 20 MG tablet, Take 1 tablet by mouth Daily., Disp: 90 tablet, Rfl: 3    rosuvastatin (CRESTOR) 20 MG tablet, Take 1 tablet by mouth Every Night., Disp: 90 tablet, Rfl: 3    sildenafil (REVATIO) 20 MG tablet, Take 1 tablet by mouth As Needed., Disp: , Rfl:     tamsulosin (FLOMAX) 0.4 MG capsule 24 hr capsule, Take 1 capsule by mouth Daily., Disp: , Rfl:     zolpidem (AMBIEN) 5 MG tablet, Take 1 tablet by mouth At Night As Needed., Disp:  ", Rfl:     Objective     VITAL SIGNS:     Vitals:    10/09/24 1139   BP: 164/68   Pulse: 69   Resp: 16   Temp: 98.7 °F (37.1 °C)   TempSrc: Oral   SpO2: 97%   Weight: 101 kg (223 lb 4.8 oz)   Height: 182.9 cm (72.01\")   PainSc: 0-No pain     Body mass index is 30.28 kg/m².     Wt Readings from Last 5 Encounters:   10/09/24 101 kg (223 lb 4.8 oz)   10/04/24 101 kg (222 lb 4.8 oz)   04/17/24 96.4 kg (212 lb 8 oz)   03/08/24 95.3 kg (210 lb)   12/21/23 92.4 kg (203 lb 11.2 oz)     PHYSICAL EXAMINATION:   GENERAL: The patient appears in good general condition, not in acute distress.   SKIN: No Ecchymosis.  EYES: No jaundice. No Pallor.  CHEST: Normal respiratory effort. Normal breathing sounds bilaterally. No added sounds.  CVS: Normal S1 and S2. No Murmur.  ABDOMEN: Soft. No tenderness. No Hepatomegaly. No Splenomegaly. No masses.  EXTREMITIES: No edema.  No calf tenderness.    DIAGNOSTIC DATA:     Results from last 7 days   Lab Units 10/04/24  1142   WBC 10*3/mm3 5.25   NEUTROS ABS 10*3/mm3 2.83   HEMOGLOBIN g/dL 14.9   HEMATOCRIT % 44.6   PLATELETS 10*3/mm3 170     Results from last 7 days   Lab Units 10/04/24  1142   SODIUM mmol/L 137   POTASSIUM mmol/L 4.8   CHLORIDE mmol/L 103   CO2 mmol/L 25.4   BUN mg/dL 23   CREATININE mg/dL 1.59*   CALCIUM mg/dL 9.6   ALBUMIN g/dL 4.3   BILIRUBIN mg/dL 0.5   ALK PHOS U/L 67   ALT (SGPT) U/L 27   AST (SGOT) U/L 28   GLUCOSE mg/dL 122*         Lab 10/04/24  1142   IRON 92   IRON SATURATION (TSAT) 21   TIBC 429   TRANSFERRIN 288   FERRITIN 32.30      Assessment & Plan    *Acute subsegmental pulmonary embolism involving the left lower lobe pulmonary artery.  It was identified incidentally on CT angiogram on 10/9/2023 (CT obtained for f/u on aortic aneurysm).  Patient did not have chest pain or shortness of breath at the time of diagnosis.  The radiologist reported that there was a right lower lobe embolus on review of prior CT in 2019.    This is concerning for chronic recurrent " pulmonary embolism.  Venous Doppler of the lower extremities on 10/30/2023 showed no evidence of DVT.  Patient traveled in August 2023 to [place] driving 1200 miles over 2 days and returning back 7 days later.  Patient retired in July 2023.  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  Family history is negative for DVT/PE.  Patient was started on Xarelto on 10/10/2023.   He had rectal bleeding that subsided after the dose of Xarelto changed from 15 mg BID to 20 mg daily.  Thrombophilia workup on 11/15/2023 was negative.  Protein C activity was 83% but this was not considered to be clinically low to represent deficiency.  Due to the recurrent pulmonary embolism, lifelong anticoagulation was recommended.  In addition, he requires anticoagulation due to his cardiac disease with history of stroke.  He is tolerating Xarelto without problem with bleeding or bruising.  No symptoms or signs of recurrent thrombosis.    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  Hemoglobin decreased to 11.3 on 11/1/2023.  11/15/2023: Hemoglobin 12.8.  He was placed on ferrous sulfate 325 mg daily.  12/21/2023: Hemoglobin improved to 15.0.  4/17/2024: Hemoglobin 14.6.  Ferritin 79.3.  Transferrin saturation improved to 46%.  Ferrous sulfate was reduced to once weekly.  10/4/2024: Hemoglobin 14.9.    Ferritin decreased to 32.3.  Transferrin saturation decreased to 21%.    *Elevated creatinine level.  10/4/2024: Creatinine increased to 1.59.    *Black stools.  Patient reported developing bleeding when he was taking the starter pack Xarelto which subsided after he started taking the 20 mg/day dose.  He had EGD and colonoscopy.  No clear source of blood loss was identified.  Single nonbleeding colonic angiectasia was reported.  Capsule endoscopy in March 2024 revealed no evidence of blood loss.  He reports that black stools fluctuate depending on the day he takes the iron.    *2.7 x 3.2 cm cystic  appearing lesion in the anterior body of the pancreas.  This was reported to be slightly larger when compared to October 2022.  He had EUS by Dr. Romo on 12/22/2023.  Cytology exam revealed hypocellular specimen with few benign appearing epithelial cells, negative for malignant cells.     PLAN:    1.  Continue Xarelto 20 mg daily.  2.  Increase ferrous sulfate to twice weekly.  3.  Increase fluid intake.  4.  Follow-up in 8 months.  We will obtain CBC CMP ferritin and iron panel.          Paula Marte MD  10/09/24

## 2024-11-13 DIAGNOSIS — R91.1 LUNG NODULE: Primary | ICD-10-CM

## 2024-11-15 ENCOUNTER — OFFICE VISIT (OUTPATIENT)
Dept: SLEEP MEDICINE | Facility: HOSPITAL | Age: 74
End: 2024-11-15
Payer: MEDICARE

## 2024-11-15 VITALS
WEIGHT: 223 LBS | HEIGHT: 72 IN | DIASTOLIC BLOOD PRESSURE: 76 MMHG | BODY MASS INDEX: 30.2 KG/M2 | OXYGEN SATURATION: 96 % | HEART RATE: 56 BPM | SYSTOLIC BLOOD PRESSURE: 154 MMHG

## 2024-11-15 DIAGNOSIS — G47.33 OSA (OBSTRUCTIVE SLEEP APNEA): ICD-10-CM

## 2024-11-15 DIAGNOSIS — N18.31 STAGE 3A CHRONIC KIDNEY DISEASE: ICD-10-CM

## 2024-11-15 DIAGNOSIS — I77.810 ASCENDING AORTA DILATATION: ICD-10-CM

## 2024-11-15 DIAGNOSIS — I10 ESSENTIAL HYPERTENSION: ICD-10-CM

## 2024-11-15 DIAGNOSIS — I25.10 CORONARY ARTERY CALCIFICATION: Primary | ICD-10-CM

## 2024-11-15 PROCEDURE — G0463 HOSPITAL OUTPT CLINIC VISIT: HCPCS

## 2024-11-15 NOTE — PROGRESS NOTES
Patient Care Team:  Josr Mahoney MD as PCP - General (Family Medicine)  Aliza Saleh MD as Consulting Physician (Cardiology)  Paula Marte MD as Consulting Physician (Hematology and Oncology)  Miracle Perez APRN as Referring Physician (Nurse Practitioner)  Shashank Vicente MD, MPH as Consulting Physician (Sleep Medicine)        History of Present Illness  The patient presents for evaluation of sleep apnea.    He has not undergone a sleep study. His bedtime is typically at 11:30 PM, and he wakes up feeling refreshed after approximately 7.5 hours of sleep. He sleeps alone and has been told that he snores when sleeping on one side, but not the other. He avoids sleeping on his back due to discomfort. He wakes up twice during the night to use the restroom. He does not experience sleepwalking or nightmares. He was referred by Dr. Saleh due to concerns about potential sleep apnea, given his history of coronary artery calcification.    He has a history of a broken nose. He intentionally lost 15 pounds.    He has a history of gout but has not had an episode in the past 3 to 4 years. He is currently taking allopurinol.    He is uncertain about having experienced a stroke. He has previously worn a heart monitor.    SOCIAL HISTORY  He is retired. He owned a travel medicine clinic. He denies tobacco use. He drinks alcohol 1 to 2 times per week.       Olema: 6    Data Reviewed: Medical chart and sleep questionnaire      PMH:  Past Medical History:   Diagnosis Date    Aneurysm 5 years ago    Annual monitoring    Aortic root dilation 03/30/2022    Ascending aorta dilatation 03/30/2022    Bilateral carotid artery stenosis     Coronary artery calcification 03/30/2022    Essential hypertension     Falls     3 unexplained    GERD (gastroesophageal reflux disease)     Gout     Hearing loss     Hyperlipidemia     Hypertension     Lumbar spondylolysis     Neuropathy     FEET    Single  "subsegmental pulmonary embolism without acute cor pulmonale 10/30/2023    Snoring     Stage 3a chronic kidney disease 03/30/2022    Syncope           Allergies:  Patient has no known allergies.     Medication Review:   Current Outpatient Medications on File Prior to Visit   Medication Sig Dispense Refill    allopurinol (ZYLOPRIM) 100 MG tablet Take 1 tablet by mouth Daily.      amLODIPine (NORVASC) 5 MG tablet Take 1 tablet by mouth Every Night. 90 tablet 3    benazepril (LOTENSIN) 40 MG tablet Take 1 tablet by mouth Daily.      ferrous sulfate 325 (65 FE) MG tablet Take 1 tablet by mouth 1 (One) Time Per Week.      levothyroxine (SYNTHROID, LEVOTHROID) 100 MCG tablet TAKE ONE TABLET BY MOUTH DAILY      metoprolol succinate XL (TOPROL-XL) 50 MG 24 hr tablet TAKE ONE TABLET BY MOUTH TWICE A  tablet 3    Multiple Vitamin (MULTI VITAMIN PO) Take 1 tablet by mouth Daily.      pantoprazole (PROTONIX) 40 MG EC tablet Take 1 tablet by mouth Daily. 90 tablet 3    rivaroxaban (XARELTO) 20 MG tablet Take 1 tablet by mouth Daily. 90 tablet 3    rosuvastatin (CRESTOR) 20 MG tablet Take 1 tablet by mouth Every Night. 90 tablet 3    sildenafil (REVATIO) 20 MG tablet Take 1 tablet by mouth As Needed.      tamsulosin (FLOMAX) 0.4 MG capsule 24 hr capsule Take 1 capsule by mouth Daily.      zolpidem (AMBIEN) 5 MG tablet Take 1 tablet by mouth At Night As Needed.       No current facility-administered medications on file prior to visit.         Vital Signs:    Vitals:    11/15/24 0900   BP: 154/76   Pulse: 56   SpO2: 96%   Weight: 101 kg (223 lb)   Height: 182.9 cm (72\")        Body mass index is 30.24 kg/m².  Neck Circumference: 17 inches  .BMIFOLLOWUP  BMI is >= 30 and <35. (Class 1 Obesity). The following options were offered after discussion;: referral to primary care      Physical Exam:    Constitutional:  Well developed 74 y.o. male that appears in no apparent distress.  Awake & oriented times 3.  Normal mood with normal " recent and remote memory and normal judgement.  Eyes:  Conjunctivae normal.  Oropharynx: Moist mucous membranes without exudate and Mallampati 4  Neck: Trachea midline  Respiratory: Effort is not labored  Cardiovascular: Radial pulse regular  Musculoskeletal: Gait appears normal, no digital clubbing evident, no pre-tibial edema        Impression:   Encounter Diagnoses   Name Primary?    Coronary artery calcification Yes    Essential hypertension     Stage 3a chronic kidney disease     Ascending aorta dilatation      Patient's BMI is Body mass index is 30.24 kg/m².        Assessment & Plan  1. Suspected Sleep Apnea.  Given his Mallampati class IV status, there is a significant risk for sleep apnea. A home sleep apnea test will be conducted to confirm the diagnosis. The test will measure respiratory effort, airflow, oxygen saturation, heart rate, snoring, and body position. If sleep apnea is confirmed, treatment options including CPAP will be discussed. The risks and benefits of CPAP versus the Inspire device were discussed, with emphasis on the higher efficacy of CPAP.    2. Coronary Artery Calcification.  He has been referred for a sleep apnea evaluation due to coronary artery calcifications. The relationship between sleep apnea and increased cardiovascular risk was explained. He is advised to monitor his heart rate and blood oxygen levels.    3. Hypertension.  He is currently on blood pressure medication. Continued monitoring and adherence to his medication regimen were advised.    4. Gout.  He has a history of gout but has not had an episode in three to four years. He is currently taking allopurinol, which has been effective in managing his condition.         The patient should practice good sleep hygiene measures.      Weight loss might be beneficial in this patient who has a Body mass index is 30.24 kg/m².      Pathophysiology of DIANA described to the patient.  Cardiovascular complications of untreated DIANA also  reviewed.      The patient was cautioned about the dangers of drowsy driving.    Patient or patient representative verbalized consent for the use of Ambient Listening during the visit with  Jayden Vicente MD for chart documentation. 11/15/2024  10:30 EST     Jayden Vicente MD  Sleep Medicine  11/15/24  10:13 EST

## 2024-11-19 ENCOUNTER — HOSPITAL ENCOUNTER (OUTPATIENT)
Dept: CT IMAGING | Facility: HOSPITAL | Age: 74
Discharge: HOME | End: 2024-11-19
Admitting: THORACIC SURGERY (CARDIOTHORACIC VASCULAR SURGERY)
Payer: MEDICARE

## 2024-11-19 DIAGNOSIS — R91.1 LUNG NODULE: ICD-10-CM

## 2024-11-19 PROCEDURE — 71250 CT THORAX DX C-: CPT

## 2024-12-02 ENCOUNTER — OFFICE VISIT (OUTPATIENT)
Dept: SURGERY | Facility: CLINIC | Age: 74
End: 2024-12-02
Payer: MEDICARE

## 2024-12-02 VITALS
OXYGEN SATURATION: 97 % | WEIGHT: 224.8 LBS | SYSTOLIC BLOOD PRESSURE: 166 MMHG | RESPIRATION RATE: 16 BRPM | DIASTOLIC BLOOD PRESSURE: 96 MMHG | HEART RATE: 62 BPM | BODY MASS INDEX: 30.49 KG/M2

## 2024-12-02 DIAGNOSIS — K86.2 PANCREATIC CYST: Primary | ICD-10-CM

## 2024-12-02 PROCEDURE — 1160F RVW MEDS BY RX/DR IN RCRD: CPT | Performed by: THORACIC SURGERY (CARDIOTHORACIC VASCULAR SURGERY)

## 2024-12-02 PROCEDURE — 99213 OFFICE O/P EST LOW 20 MIN: CPT | Performed by: THORACIC SURGERY (CARDIOTHORACIC VASCULAR SURGERY)

## 2024-12-02 PROCEDURE — 3080F DIAST BP >= 90 MM HG: CPT | Performed by: THORACIC SURGERY (CARDIOTHORACIC VASCULAR SURGERY)

## 2024-12-02 PROCEDURE — 1159F MED LIST DOCD IN RCRD: CPT | Performed by: THORACIC SURGERY (CARDIOTHORACIC VASCULAR SURGERY)

## 2024-12-02 PROCEDURE — 3077F SYST BP >= 140 MM HG: CPT | Performed by: THORACIC SURGERY (CARDIOTHORACIC VASCULAR SURGERY)

## 2024-12-17 NOTE — PROGRESS NOTES
"Chief Complaint  Lung Nodule (1  yr f/u 11/19 ct)    Subjective        Dustin Wilkerson presents to [facility] THORACIC SURGERY  History of Present Illness  Ms. Wilkerson is a pleasant 74-year-old lady who presents in follow-up for lung nodules.  She is in her usual state of health without significant complaints.  She is a never smoker.  Objective   Vital Signs:  /96 (BP Location: Left arm, Patient Position: Sitting, Cuff Size: Adult)   Pulse 62   Resp 16   Wt 102 kg (224 lb 12.8 oz)   SpO2 97%   BMI 30.49 kg/m²   Estimated body mass index is 30.49 kg/m² as calculated from the following:    Height as of 11/15/24: 182.9 cm (72\").    Weight as of this encounter: 102 kg (224 lb 12.8 oz).            Physical Exam   Result Review :          I have independently reviewed the CT of the chest performed on 11/19/2024 and agree with the radiology report which demonstrates calcified granuloma in the right lower lobe.  Calcified granuloma in the right middle lobe.  No new nodules.  No mediastinal or hilar lymphadenopathy.  No pleural or pericardial effusion.  Coronary artery calcifications.  Stable cystic lesion in the tail of the pancreas.     Assessment and Plan       Ms. 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  She does not need any further surveillance for these granulomas given their calcifications as they are benign entities.  Will plan to see her on an as-needed basis.  She also does not need any low-dose lung cancer screening as she is a never smoker.    She has been seen by Dr. Engel for her pancreatic cyst and we will plan to follow-up with him per his recommendations.       I spent 20 minutes caring for Dustin on this date of service. This time includes time spent by me in the following activities:preparing for the visit, reviewing tests, obtaining and/or reviewing a separately " obtained history, performing a medically appropriate examination and/or evaluation , counseling and educating the patient/family/caregiver, ordering medications, tests, or procedures, documenting information in the medical record, and independently interpreting results and communicating that information with the patient/family/caregiver  Follow Up   No follow-ups on file.  Patient was given instructions and counseling regarding his condition or for health maintenance advice. Please see specific information pulled into the AVS if appropriate.

## 2024-12-18 ENCOUNTER — HOSPITAL ENCOUNTER (OUTPATIENT)
Dept: SLEEP MEDICINE | Facility: HOSPITAL | Age: 74
Discharge: HOME | End: 2024-12-18
Admitting: PSYCHIATRY & NEUROLOGY
Payer: MEDICARE

## 2024-12-18 DIAGNOSIS — N18.31 STAGE 3A CHRONIC KIDNEY DISEASE: ICD-10-CM

## 2024-12-18 DIAGNOSIS — G47.33 OSA (OBSTRUCTIVE SLEEP APNEA): ICD-10-CM

## 2024-12-18 DIAGNOSIS — I77.810 ASCENDING AORTA DILATATION: ICD-10-CM

## 2024-12-18 DIAGNOSIS — I10 ESSENTIAL HYPERTENSION: ICD-10-CM

## 2024-12-18 DIAGNOSIS — I25.10 CORONARY ARTERY CALCIFICATION: ICD-10-CM

## 2024-12-18 PROCEDURE — G0399 HOME SLEEP TEST/TYPE 3 PORTA: HCPCS

## 2024-12-26 DIAGNOSIS — I10 ESSENTIAL HYPERTENSION: ICD-10-CM

## 2024-12-26 DIAGNOSIS — I25.10 CORONARY ARTERY CALCIFICATION: Primary | ICD-10-CM

## 2024-12-26 DIAGNOSIS — G47.34 NOCTURNAL HYPOXEMIA: ICD-10-CM

## 2024-12-26 DIAGNOSIS — G47.33 OSA (OBSTRUCTIVE SLEEP APNEA): ICD-10-CM

## 2024-12-27 RX ORDER — AMLODIPINE BESYLATE 5 MG/1
5 TABLET ORAL NIGHTLY
Qty: 90 TABLET | Refills: 3 | Status: SHIPPED | OUTPATIENT
Start: 2024-12-27

## 2025-03-14 ENCOUNTER — OFFICE VISIT (OUTPATIENT)
Dept: CARDIOLOGY | Facility: CLINIC | Age: 75
End: 2025-03-14
Payer: MEDICARE

## 2025-03-14 VITALS
HEIGHT: 72 IN | BODY MASS INDEX: 31.56 KG/M2 | HEART RATE: 58 BPM | WEIGHT: 233 LBS | SYSTOLIC BLOOD PRESSURE: 140 MMHG | DIASTOLIC BLOOD PRESSURE: 80 MMHG

## 2025-03-14 DIAGNOSIS — I10 ESSENTIAL HYPERTENSION: ICD-10-CM

## 2025-03-14 DIAGNOSIS — I77.810 ASCENDING AORTA DILATATION: Primary | ICD-10-CM

## 2025-03-14 DIAGNOSIS — E78.2 MIXED HYPERLIPIDEMIA: ICD-10-CM

## 2025-03-14 PROCEDURE — 3079F DIAST BP 80-89 MM HG: CPT | Performed by: FAMILY MEDICINE

## 2025-03-14 PROCEDURE — 3077F SYST BP >= 140 MM HG: CPT | Performed by: FAMILY MEDICINE

## 2025-03-14 PROCEDURE — 99214 OFFICE O/P EST MOD 30 MIN: CPT | Performed by: FAMILY MEDICINE

## 2025-03-14 PROCEDURE — 93000 ELECTROCARDIOGRAM COMPLETE: CPT | Performed by: FAMILY MEDICINE

## 2025-03-14 NOTE — PROGRESS NOTES
Date of Office Visit: 2025  Encounter Provider: MAYNOR Calero  Place of Service: [facility] CARDIOLOGY  Established cardiologist: Aliza Saleh MD  Patient Name: Dustin Wilkerson  :1950      Patient ID:  Dustin Wilkerson is a 74 y.o. male is here for  followup    With a pertinent medical history of  hypertension, nonorthostatic dizziness and falls, gout, monoclonal gammopathy (Ghosheh), pulmonary nodules, CKD, small pulmonary embolism 10/2023, ascending aorta dilation, left corona radiata lacunar infarct, hearing loss, mild bilateral carotid artery stenosis, pancreatic cyst ([place]), and hyperlipidemia.     He has one caffeinated beverage a day and one 16-ounce bottle of water a day.  He has two children.  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  In his family, there is no premature cardiovascular disease.  His mother had strokes.  He does not use any alcohol and he does not smoke.     History of Present Illness  He does have hearing loss and had an MRI of the head done in , which showed an old, chronic infarct in the left corona radiata.  This was in 2015.  He has seen Dr. Sunny Mcelroy from ENT about his hearing loss.  He saw Dimas Richmond who felt that the lacunar infarct was due to hypertension.       In 2016, he was walking out in his yard, went over to turn the hose off, and fell.  He found himself on the ground.  He is not sure if he passed out and he had a right arm injury, which was moderately severe.  He was able to continue.  He said he did not feel his heart racing or skipping before this.  He had no dizziness.  He had no chest pain or pressure and no difficulty breathing.  He just did not really know what happened, and he has been a lifelong exerciser and has never had any symptoms with exercise.  On 2016, he had taken his medications.  It was late in the evening (11:00 at night), and  he went onto his front porch and, the next thing he knew, he found himself on the ground.  He is not sure that he had any syncope.  In fact, he thinks he did not; however, he sustained a severe right leg injury and had to have multiple surgeries to repair all of his quadriceps tendons on his right leg.  He does not know why this happened.  Again, before this, he had no chest pain or difficulty breathing.  In talking with him, he is on numerous medications and he takes all of them at night, and he had taken all of them right before this second incident in 12/2016.  Because his injury was so severe, he has not walked since 12/02/2016.  With his surgeries, he was in a wheelchair for a while and then he was on a walker and now he is with a cane and has a big brace on his leg.     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 After starting him on metoprolol for SVT.  His EF was 65%. He had mild dilation of the aortic root-CTA showed a dilated aortic root and tubular ascending aorta measuring 4.7 cm and 4.5 cm respectively. A 2.2 cm cystic lesion from the tail of the pancreas.      CTA of the chest for follow-up for ascending aortic aneurysm done 9/3/2021 showed the aortic root to measure 4.4, the ascending aorta 4.3 which was stable from prior CT done 6/27/2019. significant calcification of the proximal LAD and scattered calcification throughout the RCA.  He had a treadmill stress study on 10/1/2021 which showed no evidence of ischemia, exercised for 7 minutes 30 seconds on a Beka protocol without difficulty, normal blood pressure response exercise.       He had a carotid duplex study done 5/25/2022 which showed mild left internal carotid artery stenosis and plaquing of the right internal carotid artery without stenosis.  He had a CT angiogram of the chest done 10/19/2022 showing  the aortic root measuring 4.7 cm, up from 4.6 cm in 5/25/2022. scattered calcification in the left anterior descending artery but is otherwise widely patent, proximal circumflex is patent, first diagonal is patent, left main is patent, RCA is patent throughout, well visualized with calcification in the proximal vessel.     CTA chest done 10/9/2023 showed mild aneurysmal dilation of the ascending aorta at 4.6 cm, minimal increase from 2019, pancreatic cyst noted, small pulmonary embolism noted.  He had a 2-week Zio patch monitor done 11/9/2023 which showed sinus rhythm, no atrial fibrillation, intermittent nonsustained SVT with the longest episode being 55 seconds, occasional PVCs, frequent PACs at 6.3%, dizziness and palpitations occurred with PACs.  Echocardiogram done 11/9/2023 showed ejection fraction 59% with normal RVSP, aortic root 3.9 cm, ascending aorta 4.1 cm, normal diastolic function, no significant valvular abnormalities.  CT chest without contrast on 9/23/2024 showed no suspicious pulmonary nodules or mass, pancreatic cyst noted, enlarged pulmonary arteries. Dr. Saleh reviewed the CT chest images done 9/2024 which showed calcification of the left main and LAD.     He has a pancreatic cyst and follows with Dr. Rios.     He last saw Dr. Saleh 10/4/2024 his blood pressure was elevated amlodipine was increased to 5 mg nightly and he was kept on benazepril.    Today Pat is here for follow up. Embarking on  river cruise soon with his wife. 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 BP is a little high in the office today. He follows it closely at home and it is well controlled there typically running 120/70's.  He has gained some weight and would like to lose a bit. He has no exertional dyspnea or complaints. There is no chest pain or pressure, soa, day, pnd, lightheadedness, dizziness, presyncope/syncope, leg swelling, heart racing, or palpitations. His rivaroxaban has become  "quite costly this year.  He is active does get out and walk several miles per week.  Symptoms neuropathy limits this.     Current Outpatient Medications on File Prior to Visit   Medication Sig Dispense Refill    allopurinol (ZYLOPRIM) 100 MG tablet Take 1 tablet by mouth Daily.      amLODIPine (NORVASC) 5 MG tablet Take 1 tablet by mouth Every Night. 90 tablet 3    benazepril (LOTENSIN) 40 MG tablet Take 1 tablet by mouth Daily.      ferrous sulfate 325 (65 FE) MG tablet Take 1 tablet by mouth 1 (One) Time Per Week. (Patient taking differently: Take 1 tablet by mouth 2 (Two) Times a Week.)      levothyroxine (SYNTHROID, LEVOTHROID) 100 MCG tablet TAKE ONE TABLET BY MOUTH DAILY      metoprolol succinate XL (TOPROL-XL) 50 MG 24 hr tablet TAKE ONE TABLET BY MOUTH TWICE A  tablet 3    Multiple Vitamin (MULTI VITAMIN PO) Take 1 tablet by mouth Daily.      pantoprazole (PROTONIX) 40 MG EC tablet Take 1 tablet by mouth Daily. 90 tablet 3    rivaroxaban (XARELTO) 20 MG tablet Take 1 tablet by mouth Daily. 90 tablet 3    rosuvastatin (CRESTOR) 20 MG tablet Take 1 tablet by mouth Every Night. 90 tablet 3    sildenafil (REVATIO) 20 MG tablet Take 1 tablet by mouth As Needed.      tamsulosin (FLOMAX) 0.4 MG capsule 24 hr capsule Take 1 capsule by mouth Daily.      zolpidem (AMBIEN) 5 MG tablet Take 1 tablet by mouth At Night As Needed.       No current facility-administered medications on file prior to visit.         Procedures    ECG 12 Lead    Date/Time: 3/14/2025 1:02 PM  Performed by: Casi Goldman APRN    Authorized by: Casi Goldman APRN  Comparison: compared with previous ECG from 10/4/2024  Similar to previous ECG  Rhythm: sinus rhythm  BPM: 58  T inversion: III  T flattening: aVF              Objective:      Vitals:    03/14/25 1126   BP: 140/80   Pulse: 58   Weight: 106 kg (233 lb)   Height: 182.9 cm (72\")     Body mass index is 31.6 kg/m².  Wt Readings from Last 3 Encounters:   03/14/25 106 kg (233 " lb)   12/02/24 102 kg (224 lb 12.8 oz)   11/15/24 101 kg (223 lb)         Constitutional:       Appearance: Healthy appearance. Not in distress.   Eyes:      Pupils: Pupils are equal, round, and reactive to light.   Neck:      Vascular: No carotid bruit or JVD.   Pulmonary:      Effort: Pulmonary effort is normal.      Breath sounds: Normal breath sounds.   Cardiovascular:      Normal rate. Regular rhythm.      Murmurs: There is no murmur.   Pulses:     Intact distal pulses.   Edema:     Peripheral edema absent.   Musculoskeletal:      Cervical back: Normal range of motion. Skin:     General: Skin is warm and dry.   Neurological:      Mental Status: Alert and oriented to person, place and time.   Psychiatric:         Speech: Speech normal.         Lab Review:   12/11/2024 [place] labs CMP with creatinine 1.29 otherwise unremarkable.  CBC is unremarkable.    Assessment:     1. Ascending aorta dilatation    2. Essential hypertension    3. Mixed hyperlipidemia      Hypertension, goal <120/80, blood pressure slightly high-checks at home.  Running 140/80 at home.  Hyperlipidemia.  On rosuvastatin.  Gout.    SVT on toprol and controlling it well  Dilated ascending aorta,  4.7 cm.  Stable.  He is on metoprolol 50 mg twice daily, it was just decreased from 75 twice daily due to low blood pressure.    Right lumbar radicular pain, seeing Dr. Richmond.  Due to disc disease at L3-L4.   Coronary artery calcification in the LAD and RCA, on rosuvastatin 20 mg nightly.    Normal treadmill stress study done 10/2021.  Mild left internal carotid artery stenosis.  Monoclonal gammopathy- follows with .   Pancreatic cyst - follows with Dr. Rios.   Pulmonary embolism, small, noted on CTA chest 10/2023, is on Xarelto and will be on lifelong Xarelto per Dr. Marte.   History of CVA: He had an MRI head in 2015 for hearing loss which showed an old, chronic infarct in the left corona radiata. He is on Xarelto and rosuvastatin.    Neuropathy of the feet.  CKD, sees Dr. Ferrell.    Plan:   No medication changes were made  Stable from a cardiac standpoint  His blood pressure is well-controlled at home-continue to monitor this  CTA chest to follow dilated ascending aorta to be done November 2025, he was scheduled to see Dr. Saleh after that.       Thank you for allowing me to participate in this patient's care. Please call with any questions or concerns.          Dragon dictation device was utilized in this note.

## 2025-03-18 ENCOUNTER — HOSPITAL ENCOUNTER (OUTPATIENT)
Dept: SLEEP MEDICINE | Facility: HOSPITAL | Age: 75
Discharge: HOME | End: 2025-03-18
Admitting: PSYCHIATRY & NEUROLOGY
Payer: MEDICARE

## 2025-03-18 DIAGNOSIS — I10 ESSENTIAL HYPERTENSION: ICD-10-CM

## 2025-03-18 DIAGNOSIS — G47.33 OSA (OBSTRUCTIVE SLEEP APNEA): ICD-10-CM

## 2025-03-18 DIAGNOSIS — I25.10 CORONARY ARTERY CALCIFICATION: ICD-10-CM

## 2025-03-18 DIAGNOSIS — G47.34 NOCTURNAL HYPOXEMIA: ICD-10-CM

## 2025-03-18 PROCEDURE — 95811 POLYSOM 6/>YRS CPAP 4/> PARM: CPT

## 2025-03-25 DIAGNOSIS — I25.10 CORONARY ARTERY CALCIFICATION: Primary | ICD-10-CM

## 2025-03-25 DIAGNOSIS — G47.34 NOCTURNAL HYPOXEMIA: ICD-10-CM

## 2025-03-25 DIAGNOSIS — G47.33 OSA (OBSTRUCTIVE SLEEP APNEA): ICD-10-CM

## 2025-03-25 DIAGNOSIS — I77.810 ASCENDING AORTA DILATATION: ICD-10-CM

## 2025-03-25 DIAGNOSIS — I10 ESSENTIAL HYPERTENSION: ICD-10-CM

## 2025-03-26 ENCOUNTER — TELEPHONE (OUTPATIENT)
Dept: SLEEP MEDICINE | Facility: HOSPITAL | Age: 75
End: 2025-03-26
Payer: MEDICARE

## 2025-03-26 NOTE — TELEPHONE ENCOUNTER
Called patient to give sleep study results.  Left VM for patient to call the sleep center in [place].

## 2025-03-27 ENCOUNTER — TELEPHONE (OUTPATIENT)
Dept: SLEEP MEDICINE | Facility: HOSPITAL | Age: 75
End: 2025-03-27
Payer: MEDICARE

## 2025-03-27 NOTE — TELEPHONE ENCOUNTER
Pt called Sleep center and I gave him his study results over the phone.  Pt verablized that he understood.  New Patient set up therapy device and supply order has been sent over to [facility].  Pt will call and make follow up and compliance appt after pt picks up his device.

## 2025-04-02 RX ORDER — ROSUVASTATIN CALCIUM 20 MG/1
20 TABLET, COATED ORAL NIGHTLY
Qty: 90 TABLET | Refills: 3 | Status: SHIPPED | OUTPATIENT
Start: 2025-04-02

## 2025-04-08 RX ORDER — METOPROLOL SUCCINATE 50 MG/1
50 TABLET, EXTENDED RELEASE ORAL EVERY 12 HOURS SCHEDULED
Qty: 180 TABLET | Refills: 1 | Status: SHIPPED | OUTPATIENT
Start: 2025-04-08

## 2025-04-09 ENCOUNTER — TELEPHONE (OUTPATIENT)
Dept: ONCOLOGY | Facility: CLINIC | Age: 75
End: 2025-04-09
Payer: MEDICARE

## 2025-04-09 NOTE — TELEPHONE ENCOUNTER
"  Caller: Dustin Wilkerson \"Pat\"    Relationship to patient: Self    Best call back number: [phone]    Chief complaint: PATIENT NEEDS TO RESCHEDULE     Type of visit: LAB AND FOLLOW UP 1    Requested date: CAN DO SAME DAY JUST AFTER 1PM     If rescheduling, when is the original appointment: 6-18-25     "

## 2025-04-23 ENCOUNTER — TELEPHONE (OUTPATIENT)
Dept: ONCOLOGY | Facility: CLINIC | Age: 75
End: 2025-04-23
Payer: MEDICARE

## 2025-04-23 NOTE — TELEPHONE ENCOUNTER
Caller: Dustin Wilkerson    Relationship to patient: Self    Best call back number: [phone]    Is this a treatment appointment:  No    Additional notes:Pt left message on [facility], believes he has appt this week. Next appt is 6/18

## 2025-05-05 ENCOUNTER — DOCUMENTATION (OUTPATIENT)
Dept: SLEEP MEDICINE | Facility: HOSPITAL | Age: 75
End: 2025-05-05
Payer: MEDICARE

## 2025-05-05 NOTE — PROGRESS NOTES
Pt called to state that he will not be able to be reached for 4 weeks.  Pt is bringing his therapy device with him.

## 2025-06-18 ENCOUNTER — LAB (OUTPATIENT)
Dept: OTHER | Facility: HOSPITAL | Age: 75
End: 2025-06-18
Payer: MEDICARE

## 2025-06-18 ENCOUNTER — OFFICE VISIT (OUTPATIENT)
Dept: ONCOLOGY | Facility: CLINIC | Age: 75
End: 2025-06-18
Payer: MEDICARE

## 2025-06-18 VITALS
WEIGHT: 230.3 LBS | HEART RATE: 60 BPM | DIASTOLIC BLOOD PRESSURE: 77 MMHG | SYSTOLIC BLOOD PRESSURE: 128 MMHG | HEIGHT: 72 IN | OXYGEN SATURATION: 97 % | RESPIRATION RATE: 16 BRPM | BODY MASS INDEX: 31.19 KG/M2 | TEMPERATURE: 96.8 F

## 2025-06-18 DIAGNOSIS — Z79.01 CHRONIC ANTICOAGULATION: ICD-10-CM

## 2025-06-18 DIAGNOSIS — I26.93 SINGLE SUBSEGMENTAL PULMONARY EMBOLISM WITHOUT ACUTE COR PULMONALE: Primary | ICD-10-CM

## 2025-06-18 DIAGNOSIS — D50.0 IRON DEFICIENCY ANEMIA DUE TO CHRONIC BLOOD LOSS: ICD-10-CM

## 2025-06-18 LAB
ALBUMIN SERPL-MCNC: 4.3 G/DL (ref 3.5–5.2)
ALBUMIN/GLOB SERPL: 1.6 G/DL
ALP SERPL-CCNC: 61 U/L (ref 39–117)
ALT SERPL W P-5'-P-CCNC: 25 U/L (ref 1–41)
ANION GAP SERPL CALCULATED.3IONS-SCNC: 9.5 MMOL/L (ref 5–15)
AST SERPL-CCNC: 21 U/L (ref 1–40)
BASOPHILS # BLD AUTO: 0.04 10*3/MM3 (ref 0–0.2)
BASOPHILS NFR BLD AUTO: 0.6 % (ref 0–1.5)
BILIRUB SERPL-MCNC: 0.5 MG/DL (ref 0–1.2)
BUN SERPL-MCNC: 31.3 MG/DL (ref 8–23)
BUN/CREAT SERPL: 19.8 (ref 7–25)
CALCIUM SPEC-SCNC: 9.4 MG/DL (ref 8.6–10.5)
CHLORIDE SERPL-SCNC: 104 MMOL/L (ref 98–107)
CO2 SERPL-SCNC: 24.5 MMOL/L (ref 22–29)
CREAT SERPL-MCNC: 1.58 MG/DL (ref 0.76–1.27)
DEPRECATED RDW RBC AUTO: 44.6 FL (ref 37–54)
EGFRCR SERPLBLD CKD-EPI 2021: 45.6 ML/MIN/1.73
EOSINOPHIL # BLD AUTO: 0.14 10*3/MM3 (ref 0–0.4)
EOSINOPHIL NFR BLD AUTO: 1.9 % (ref 0.3–6.2)
ERYTHROCYTE [DISTWIDTH] IN BLOOD BY AUTOMATED COUNT: 13.9 % (ref 12.3–15.4)
FERRITIN SERPL-MCNC: 43.3 NG/ML (ref 30–400)
GLOBULIN UR ELPH-MCNC: 2.7 GM/DL
GLUCOSE SERPL-MCNC: 134 MG/DL (ref 65–99)
HCT VFR BLD AUTO: 43.7 % (ref 37.5–51)
HGB BLD-MCNC: 15.1 G/DL (ref 13–17.7)
IMM GRANULOCYTES # BLD AUTO: 0.08 10*3/MM3 (ref 0–0.05)
IMM GRANULOCYTES NFR BLD AUTO: 1.1 % (ref 0–0.5)
IRON 24H UR-MRATE: 76 MCG/DL (ref 59–158)
IRON SATN MFR SERPL: 19 % (ref 20–50)
LYMPHOCYTES # BLD AUTO: 1.46 10*3/MM3 (ref 0.7–3.1)
LYMPHOCYTES NFR BLD AUTO: 20.2 % (ref 19.6–45.3)
MCH RBC QN AUTO: 30.2 PG (ref 26.6–33)
MCHC RBC AUTO-ENTMCNC: 34.6 G/DL (ref 31.5–35.7)
MCV RBC AUTO: 87.4 FL (ref 79–97)
MONOCYTES # BLD AUTO: 0.66 10*3/MM3 (ref 0.1–0.9)
MONOCYTES NFR BLD AUTO: 9.1 % (ref 5–12)
NEUTROPHILS NFR BLD AUTO: 4.84 10*3/MM3 (ref 1.7–7)
NEUTROPHILS NFR BLD AUTO: 67.1 % (ref 42.7–76)
NRBC BLD AUTO-RTO: 0 /100 WBC (ref 0–0.2)
PLATELET # BLD AUTO: 177 10*3/MM3 (ref 140–450)
PMV BLD AUTO: 9 FL (ref 6–12)
POTASSIUM SERPL-SCNC: 4.8 MMOL/L (ref 3.5–5.2)
PROT SERPL-MCNC: 7 G/DL (ref 6–8.5)
RBC # BLD AUTO: 5 10*6/MM3 (ref 4.14–5.8)
SODIUM SERPL-SCNC: 138 MMOL/L (ref 136–145)
TIBC SERPL-MCNC: 408 MCG/DL (ref 298–536)
TRANSFERRIN SERPL-MCNC: 274 MG/DL (ref 200–360)
WBC NRBC COR # BLD AUTO: 7.22 10*3/MM3 (ref 3.4–10.8)

## 2025-06-18 PROCEDURE — 84466 ASSAY OF TRANSFERRIN: CPT | Performed by: INTERNAL MEDICINE

## 2025-06-18 PROCEDURE — 80053 COMPREHEN METABOLIC PANEL: CPT | Performed by: INTERNAL MEDICINE

## 2025-06-18 PROCEDURE — 83540 ASSAY OF IRON: CPT | Performed by: INTERNAL MEDICINE

## 2025-06-18 PROCEDURE — 36415 COLL VENOUS BLD VENIPUNCTURE: CPT

## 2025-06-18 PROCEDURE — 85025 COMPLETE CBC W/AUTO DIFF WBC: CPT | Performed by: INTERNAL MEDICINE

## 2025-06-18 PROCEDURE — 82728 ASSAY OF FERRITIN: CPT | Performed by: INTERNAL MEDICINE

## 2025-06-18 NOTE — PROGRESS NOTES
Subjective     CHIEF COMPLAINT:      Chief Complaint   Patient presents with    Appointment     HISTORY OF PRESENT ILLNESS:     Dustin Wilkerson is a 74 y.o. male patient who returns today for follow up on his prior history of PE and on his iron deficiency anemia.  He is on Xarelto 20 mg daily.  He continues to tolerate anticoagulation without difficulty.  He has no signs or symptoms of bleeding.  He reports his stool is occasionally dark in color though he does continue on oral iron twice weekly.  He tolerates his oral iron without issue.    ROS:  Pertinent ROS is in the HPI.     Past medical, surgical, social and family history were reviewed.     MEDICATIONS:    Current Outpatient Medications:     allopurinol (ZYLOPRIM) 100 MG tablet, Take 1 tablet by mouth Daily., Disp: , Rfl:     amLODIPine (NORVASC) 5 MG tablet, Take 1 tablet by mouth Every Night., Disp: 90 tablet, Rfl: 3    benazepril (LOTENSIN) 40 MG tablet, Take 1 tablet by mouth Daily., Disp: , Rfl:     ferrous sulfate 325 (65 FE) MG tablet, Take 1 tablet by mouth 1 (One) Time Per Week. (Patient taking differently: Take 1 tablet by mouth 2 (Two) Times a Week.), Disp: , Rfl:     levothyroxine (SYNTHROID, LEVOTHROID) 100 MCG tablet, TAKE ONE TABLET BY MOUTH DAILY, Disp: , Rfl:     metoprolol succinate XL (TOPROL-XL) 50 MG 24 hr tablet, TAKE 1 TABLET BY MOUTH TWICE A DAY, Disp: 180 tablet, Rfl: 1    Multiple Vitamin (MULTI VITAMIN PO), Take 1 tablet by mouth Daily., Disp: , Rfl:     pantoprazole (PROTONIX) 40 MG EC tablet, Take 1 tablet by mouth Daily., Disp: 90 tablet, Rfl: 3    rivaroxaban (XARELTO) 20 MG tablet, Take 1 tablet by mouth Daily., Disp: 28 tablet, Rfl: 0    rosuvastatin (CRESTOR) 20 MG tablet, Take 1 tablet by mouth Every Night., Disp: 90 tablet, Rfl: 3    sildenafil (REVATIO) 20 MG tablet, Take 1 tablet by mouth As Needed., Disp: , Rfl:     tamsulosin (FLOMAX) 0.4 MG capsule 24 hr capsule, Take 1 capsule by mouth Daily., Disp: , Rfl:      "zolpidem (AMBIEN) 5 MG tablet, Take 1 tablet by mouth At Night As Needed., Disp: , Rfl:     Objective     VITAL SIGNS:     Vitals:    06/18/25 1403   BP: 128/77   Pulse: 60   Resp: 16   Temp: 96.8 °F (36 °C)   TempSrc: Temporal   SpO2: 97%   Weight: 104 kg (230 lb 4.8 oz)   Height: 182 cm (71.65\")   PainSc: 0-No pain       Body mass index is 31.54 kg/m².     Wt Readings from Last 5 Encounters:   06/18/25 104 kg (230 lb 4.8 oz)   03/14/25 106 kg (233 lb)   12/02/24 102 kg (224 lb 12.8 oz)   11/15/24 101 kg (223 lb)   10/09/24 101 kg (223 lb 4.8 oz)     PHYSICAL EXAMINATION:   GENERAL: The patient appears in good general condition, not in acute distress.   SKIN: No Ecchymosis.  EYES: No jaundice. No Pallor.  CHEST: Normal respiratory effort. Normal breathing sounds bilaterally. No added sounds.  CVS: Normal S1 and S2. No Murmur.  EXTREMITIES: No edema.  No calf tenderness.    DIAGNOSTIC DATA:     Results from last 7 days   Lab Units 06/18/25  1357   WBC 10*3/mm3 7.22   NEUTROS ABS 10*3/mm3 4.84   HEMOGLOBIN g/dL 15.1   HEMATOCRIT % 43.7   PLATELETS 10*3/mm3 177       Results from last 7 days   Lab Units 06/18/25  1357   SODIUM mmol/L 138   POTASSIUM mmol/L 4.8   CHLORIDE mmol/L 104   CO2 mmol/L 24.5   BUN mg/dL 31.3*   CREATININE mg/dL 1.58*   CALCIUM mg/dL 9.4   ALBUMIN g/dL 4.3   BILIRUBIN mg/dL 0.5   ALK PHOS U/L 61   ALT (SGPT) U/L 25   AST (SGOT) U/L 21   GLUCOSE mg/dL 134*           Lab 06/18/25  1357   IRON 76   IRON SATURATION (TSAT) 19*   TIBC 408   TRANSFERRIN 274   FERRITIN 43.30        Assessment & Plan    *Acute subsegmental pulmonary embolism involving the left lower lobe pulmonary artery.  It was identified incidentally on CT angiogram on 10/9/2023 (CT obtained for f/u on aortic aneurysm).  Patient did not have chest pain or shortness of breath at the time of diagnosis.  The radiologist reported that there was a right lower lobe embolus on review of prior CT in 2019.    This is concerning for chronic " recurrent pulmonary embolism.  Venous Doppler of the lower extremities on 10/30/2023 showed no evidence of DVT.  Patient traveled in August 2023 to [place] driving 1200 miles over 2 days and returning back 7 days later.  Patient retired in July 2023.  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  Family history is negative for DVT/PE.  Patient was started on Xarelto on 10/10/2023.   He had rectal bleeding that subsided after the dose of Xarelto changed from 15 mg BID to 20 mg daily.  Thrombophilia workup on 11/15/2023 was negative.  Protein C activity was 83% but this was not considered to be clinically low to represent deficiency.  Due to the recurrent pulmonary embolism, lifelong anticoagulation was recommended.  In addition, he requires anticoagulation due to his cardiac disease with history of stroke.  6/19/2025 tolerating Xarelto 20 mg daily without difficulty.  He has no signs or symptoms of recurrent thrombosis, no signs or symptoms of bleeding    *Iron deficiency anemia secondary to chronic blood losses.  Hemoglobin decreased to 11.3 on 11/1/2023.  11/15/2023: Hemoglobin 12.8.  He was placed on ferrous sulfate 325 mg daily.  12/21/2023: Hemoglobin improved to 15.0.  4/17/2024: Hemoglobin 14.6.  Ferritin 79.3.  Transferrin saturation improved to 46%.  Ferrous sulfate was reduced to once weekly.  10/4/2024: Hemoglobin 14.9.  Ferritin decreased to 32.3.  Transferrin saturation decreased to 21%.  6/18/2025 hemoglobin is improved at 15.1 with ferritin improved to 43.3, iron saturation 19%, continue oral iron twice weekly    *Elevated creatinine level.  10/4/2024: Creatinine increased to 1.59.  6/18/2025 creatinine at baseline at 1.58    *Black stools.  Patient reported developing bleeding when he was taking the starter pack Xarelto which subsided after he started taking the 20 mg/day dose.  He had EGD and colonoscopy.  No clear source of blood loss was identified.  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  Capsule endoscopy in March 2024 revealed no evidence of blood loss.  He reports that black stools fluctuate depending on the day he takes the iron.    *2.7 x 3.2 cm cystic appearing lesion in the anterior body of the pancreas.  This was reported to be slightly larger when compared to October 2022.  He had EUS by Dr. Romo on 12/22/2023.  Cytology exam revealed hypocellular specimen with few benign appearing epithelial cells, negative for malignant cells.     PLAN:  Continue anticoagulation with Xarelto 20 mg daily.  Planning lifelong anticoagulation  Continue ferrous sulfate twice weekly  The patient understands to call if he develops signs or symptoms of thrombosis, signs or symptoms of bleeding  Return in 8 months for MD follow-up with Dr. Marte with CBC, CMP, ferritin, iron profile          Tressa Henry, APRN  06/18/25

## 2025-07-10 ENCOUNTER — OFFICE VISIT (OUTPATIENT)
Facility: HOSPITAL | Age: 75
End: 2025-07-10
Payer: MEDICARE

## 2025-07-10 VITALS
SYSTOLIC BLOOD PRESSURE: 143 MMHG | HEART RATE: 60 BPM | DIASTOLIC BLOOD PRESSURE: 76 MMHG | HEIGHT: 72 IN | WEIGHT: 230 LBS | OXYGEN SATURATION: 94 % | BODY MASS INDEX: 31.15 KG/M2

## 2025-07-10 DIAGNOSIS — I10 ESSENTIAL HYPERTENSION: Primary | ICD-10-CM

## 2025-07-10 DIAGNOSIS — G47.34 NOCTURNAL HYPOXEMIA: ICD-10-CM

## 2025-07-10 DIAGNOSIS — G47.33 OSA (OBSTRUCTIVE SLEEP APNEA): ICD-10-CM

## 2025-07-10 PROCEDURE — G0463 HOSPITAL OUTPT CLINIC VISIT: HCPCS

## 2025-07-10 NOTE — PROGRESS NOTES
[facility] Up [facility] Note       Primary Care Physician: Josr Mahoney MD    Interval History:   The patient is a 74 y.o. male      History of Present Illness  The patient presents for sleep apnea.    He reports a significant improvement in his condition, with the number of apneic episodes per hour reducing from 62 to 8. He has been using a CPAP machine with pressures set at 15 cm H2O during inhalation and 11 cm H2O during exhalation. He experiences minimal mask leakage but notes that the masks do not last the expected month, often requiring adjustments around the 20th or 21st day. He cleans the masks with water and occasionally uses alcohol and a cotton ball. His sleep quality has improved, with fewer awakenings at night, quicker sleep onset, and longer sleep duration. He had previously removed the ramp feature from his CPAP machine as he felt it was not providing sufficient air. He is considering whether an overall increase in pressure might be beneficial.         Downloaded PAP Data Evaluated For Therapeutic Response and Compliance:      DME is [employer].    Downloads between 6/11/2025 and 7/10/2025.      Average usage is 7 hours and 9 minutes and 97% of the last 30 days for more than 4 hours.    Average  Residual AHI is 8.0.    Median PAP pressure is IPAP of 15 cm water pressure and EPAP of 11 CWP.    The patient uses a fullface mask interface.    Review of Systems:    A complete review of systems was done and all were negative with the exception of none    Social History:    Social History     Socioeconomic History    Marital status:      Spouse name: Jess   Tobacco Use    Smoking status: Never     Passive exposure: Never    Smokeless tobacco: Never   Vaping Use    Vaping status: Never Used   Substance and Sexual Activity    Alcohol use: Never     Comment: caffeine use: None    Drug use: Never    Sexual activity: Yes     Partners: Female     Birth control/protection: None  "      Allergies:  Patient has no known allergies.     Medication Review:  Reviewed.      Vital Signs:    Vitals:    07/10/25 0954   BP: 143/76   Pulse: 60   SpO2: 94%   Weight: 104 kg (230 lb)   Height: 182 cm (71.65\")     Body mass index is 31.5 kg/m².  .BMIFOLLOWUP    Physical Exam:    Constitutional:  Well developed 74 y.o. male that appears in no apparent distress.  Awake & oriented times 3.  Normal mood with normal recent and remote memory and normal judgement.  Eyes:  Conjunctivae normal.      Self-administered [place] Sleepiness Scale test results: 11  0-5 Lower normal daytime sleepiness  6-10 Higher normal daytime sleepiness  11-12 Mild, 13-15 Moderate, & 16-24 Severe excessive daytime sleepiness       I have reviewed the above results and compared them with the patient's last downloads and reviewed with the patient.    Impression:     Encounter Diagnoses   Name Primary?    Essential hypertension Yes    DIANA (obstructive sleep apnea)     Nocturnal hypoxemia          Assessment & Plan  1. Sleep Apnea.  His sleep apnea has shown significant improvement, with the number of apneic episodes per hour reducing from 62 to 8. He is currently on a CPAP machine with pressures set at 15 cm H2O during inhalation and 11 cm H2O during exhalation. The potential risks associated with increasing the pressure, such as mask leakage and aerophagia, were discussed. He was advised to clean his CPAP mask with soap and water instead of alcohol to prevent silicone degradation. The CPAP pressure will be increased by 1 cm H2O, resulting in new settings of 16 cm H2O during inhalation and 12 cm H2O during exhalation.    Follow-up  A follow-up appointment is scheduled for 3 months from now.         Good sleep hygiene measures should be maintained.  Weight loss would be beneficial in this patient who has obesity class I by Body mass index is 31.5 kg/m².      After evaluating the patient and assessing results available, the patient is " benefiting from the treatment being provided.     The patient will continue BiPAP.  Potential side effects of PAP therapy reviewed and addressed as needed.  After clinical evaluation and review of downloads, I recommend no changes to the patient's pressures.      I answered all of the patient's questions.  The patient will call the [facility] for any problems and will follow up 3 months.    Patient or patient representative verbalized consent for the use of Ambient Listening during the visit with  Jayden Vicente MD for chart documentation. 7/10/2025  10:32 EDT           Jayden Vicente MD  Sleep Medicine  07/10/25  10:32 EDT

## 2025-07-15 ENCOUNTER — TELEPHONE (OUTPATIENT)
Dept: CARDIOLOGY | Age: 75
End: 2025-07-15
Payer: MEDICARE

## 2025-07-15 DIAGNOSIS — E78.2 MIXED HYPERLIPIDEMIA: Primary | ICD-10-CM

## 2025-07-15 RX ORDER — ROSUVASTATIN CALCIUM 20 MG/1
10 TABLET, COATED ORAL NIGHTLY
Start: 2025-07-15

## 2025-07-15 NOTE — TELEPHONE ENCOUNTER
Called and left VM, will continue to try to reach pt.    HUB- please put patient straight through to triage    Asya Christianson, HYACINTH  Triage RN  07/15/25 13:05 EDT

## 2025-07-15 NOTE — TELEPHONE ENCOUNTER
I spoke with Dustin Wilkerson and gave them message from the provider.  They verbalized understanding & have no further questions at this time.    Natividad ELLIS RN  Triage [facility] [place]  07/15/25 14:31 EDT

## 2025-07-25 RX ORDER — ROSUVASTATIN CALCIUM 10 MG/1
10 TABLET, COATED ORAL NIGHTLY
Qty: 90 TABLET | Refills: 3 | Status: SHIPPED
Start: 2025-07-25

## 2025-08-01 RX ORDER — ROSUVASTATIN CALCIUM 10 MG/1
10 TABLET, COATED ORAL NIGHTLY
Qty: 90 TABLET | Refills: 3 | Status: SHIPPED | OUTPATIENT
Start: 2025-08-01

## 2025-08-01 NOTE — TELEPHONE ENCOUNTER
"    Caller: Dustin Wilkerson \"Pat\"    Relationship: Self    Best call back number: [phone]    Requested Prescriptions:   Requested Prescriptions     Pending Prescriptions Disp Refills    rosuvastatin (CRESTOR) 10 MG tablet       Sig: Take 1 tablet by mouth Every Night.        Pharmacy where request should be sent: [facility] [id number] [address] - [phone] PH - [phone] FX     Last office visit with prescribing clinician: 10/4/2024   Last telemedicine visit with prescribing clinician: Visit date not found   Next office visit with prescribing clinician: 11/3/2025     Additional details provided by patient: [facility] WAS CALLED IN INSTEAD OF THE [facility]      Naila Christianson Rep   08/01/25 10:44 EDT         "

## (undated) DEVICE — ADAPT CLN BIOGUARD AIR/H2O DISP

## (undated) DEVICE — BLCK/BITE BLOX W/DENTL/RIM W/STRAP 54F

## (undated) DEVICE — LN SMPL CO2 SHTRM SD STREAM W/M LUER

## (undated) DEVICE — TUBING, SUCTION, 1/4" X 10', STRAIGHT: Brand: MEDLINE

## (undated) DEVICE — CANN O2 ETCO2 FITS ALL CONN CO2 SMPL A/ 7IN DISP LF

## (undated) DEVICE — KT ORCA ORCAPOD DISP STRL

## (undated) DEVICE — SENSR O2 OXIMAX FNGR A/ 18IN NONSTR